# Patient Record
Sex: FEMALE | Race: ASIAN | NOT HISPANIC OR LATINO | Employment: FULL TIME | URBAN - METROPOLITAN AREA
[De-identification: names, ages, dates, MRNs, and addresses within clinical notes are randomized per-mention and may not be internally consistent; named-entity substitution may affect disease eponyms.]

---

## 2017-02-22 ENCOUNTER — TELEPHONE (OUTPATIENT)
Dept: NURSING | Facility: CLINIC | Age: 42
End: 2017-02-22

## 2017-02-22 ENCOUNTER — TELEPHONE (OUTPATIENT)
Dept: FAMILY MEDICINE | Facility: CLINIC | Age: 42
End: 2017-02-22

## 2017-02-22 NOTE — TELEPHONE ENCOUNTER
Patient called the clinic, OV 4/22/16 with Dr. ELVIRA Leiva.   She had a menstrual cycle on Feb 11th, and today another menstrual cycle started.   This time there is more bleeding than usual, used 2 pads not fully soaked today.  She usually has some cramping, but today there no pain.  Patient denied: fever, dizziness, trauma, or use of any new meds.   There is a possibility for pregnancy, but patient is not aware of pregnancy.     NURSING PLAN: Routed to provider Yes and Nursing advice to patient given     Routing to provider team 2, should patient be seen or just monitor?     RECOMMENDED DISPOSITION:  Home care advice -     Monitor amount of bleeding and pain.     Report if: Bleeding persists or worsens, severe abdominal pain, fever, chills, or muscle aches, passing tissues or large clots.  Seek ER: Pale and moist skin, dizziness, soaking 2 pads or tampons per hour for 2 hours.     Will comply with recommendation: Yes  If further questions/concerns or if symptoms do not improve, worsen or new symptoms develop, call your PCP or [facility] as soon as possible.      Guideline used:  Telephone Triage Protocols for Nurses, Fourth Edition, Kitty Purvis RN

## 2017-02-22 NOTE — TELEPHONE ENCOUNTER
Agree with triage disposition.   Sounds like she should have a pregnancy test and office visit if bleeding and pain subside somewhat. If they do not, as recommended by triage, I would have patient seen in the ED. Nicole Joy Siegler, PA-C

## 2017-02-22 NOTE — TELEPHONE ENCOUNTER
"Call Type: Triage Call    Presenting Problem: \"I'm calling back because they left a message.  I'm having my period for the second time this month.\"  Per the  clinic notes, patient should see MD and have a pregnancy test done.  Triage Note:  Guideline Title: Vaginal Bleeding, Premenopausal: Abnormal ; Vaginal  Bleeding, Premenopausal: Abnormal  Recommended Disposition: Provide Home/Self Care  Original Inclination: Wanted to speak with a nurse  Override Disposition: See Provider within 72 Hours  Intended Action: Follow advice given  Physician Contacted: No  All other situations ?  YES  Foul-smelling vaginal discharge ? NO  Vaginal trauma, sexual assault or rape ? NO  Mild abdominal pain or cramping ? NO  Has IUD inserted ? NO  New or worsening signs and symptoms that may indicate shock ? NO  Pregnant,  less than 20 weeks gestation ? NO  Pregnant,  greater than 20 weeks gestation ? NO  Spotting or bleeding from irritation on labia or perineum ? NO  Unbearable abdominal/pelvic pain or cramping ? NO  Abdominal/pelvic pain AND has ectopic risk factor(s) ? NO  Unusual bruising or bleeding from other sites ? NO  Profuse vaginal bleeding not contained by pads or tampons ? NO  Possible trauma from vaginal foreign body ? NO  Mild bleeding or spotting AND moderate abdominal pain or cramping other than  during usual menstrual cycle ? NO  Amount or duration of menstrual bleeding varies from normal pattern for 3 or more  cycles ? NO  Vaginal bleeding between cycles for 3 cycles or more ? NO  Heavy vaginal bleeding (soaking 1 pad/tampon every hour for 2 hours or more) ? NO  Recent GYN surgery ? NO  Post-GYN exam/procedure AND any mild vaginal bleeding lasting longer than  timeframe specified in post-care instructions. ? NO  Mild vaginal bleeding or persistent vaginal spotting or scanty flow for more than  one month AND has been using hormonal contraceptives for more than 3 months ? NO  Menopausal AND not on menopausal hormone " therapy (MHT) ? NO  Menopausal AND on menopausal hormone therapy (MHT) ? NO  Moderate bleeding other than during usual menstrual cycle ? NO  Mild bleeding lasting more than 3 times the woman's usual period length ? NO  Persistent dizziness OR lightheadedness that does not resolve within ten minutes ?  NO  Vaginal bleeding with or without pain following sexual intercourse that is not  first sexual contact ? NO  Recent childbirth or miscarriage ? NO  Passing clots the size of a golf ball or larger OR passing clots for more than one  day with vaginal bleeding ? NO  Vaginal bleeding or spotting following first sexual intercourse ? NO  Physician Instructions:  Care Advice:

## 2017-02-23 NOTE — TELEPHONE ENCOUNTER
Patient states today she does not have any pain.  States did make appointment at 2:30 tomorrow.  States bleeding is about the same today.    Denies: lightheaded, dizziness, pale/clammy skin    Advised again if bleeding or pain worsens to not wait for appointment and be seen in ER.  Patient agreed.  Kyara Clay RN  [employer]riage [employer]

## 2017-02-24 ENCOUNTER — OFFICE VISIT (OUTPATIENT)
Dept: FAMILY MEDICINE | Facility: CLINIC | Age: 42
End: 2017-02-24
Payer: COMMERCIAL

## 2017-02-24 VITALS
TEMPERATURE: 97.5 F | SYSTOLIC BLOOD PRESSURE: 110 MMHG | HEIGHT: 59 IN | OXYGEN SATURATION: 95 % | BODY MASS INDEX: 18.65 KG/M2 | WEIGHT: 92.5 LBS | RESPIRATION RATE: 18 BRPM | DIASTOLIC BLOOD PRESSURE: 60 MMHG | HEART RATE: 98 BPM

## 2017-02-24 DIAGNOSIS — N92.0 EXCESSIVE OR FREQUENT MENSTRUATION: Primary | ICD-10-CM

## 2017-02-24 PROCEDURE — 99214 OFFICE O/P EST MOD 30 MIN: CPT | Performed by: FAMILY MEDICINE

## 2017-02-24 RX ORDER — NITROFURANTOIN 25; 75 MG/1; MG/1
100 CAPSULE ORAL 2 TIMES DAILY
Qty: 14 CAPSULE | Refills: 0 | Status: CANCELLED | OUTPATIENT
Start: 2017-02-24

## 2017-02-24 NOTE — MR AVS SNAPSHOT
After Visit Summary   2/24/2017    Carlos Eng    MRN: [id number]           Patient Information     Date Of Birth          1975        Visit Information        Provider Department      2/24/2017 2:40 PM Tess Leiva MD [facility]        Today's Diagnoses     Excessive or frequent menstruation since 10-16    -  1      Care Instructions    Get the pelvic US  With transvaginal At [facility]   [phone]  At  [address] between the [facility] & [facility]    Boil water and breath the warm vapors 2-3 times a day to try to open up the sinuses. Run a steamer or cold air vaporizer as much as possible. Take Mucinex plain blue  1200 mg (This may come as 600mg/tablet and you need to take 2 tabs twice a day) twice a day. Mucinex is guaifenesin, the major component of most cough syrups, because it makes the mucus less thick, and therefore it drains out better and you are less likely to cough from it dripping on the back of your throat.  Irrigate the  nose with plain water under the kitchen sink faucet or the shower.  Mandy pots, spray bottles, etc accumulate bacteria and are not recommended.   The tickle in the throat is also helped by gargling with vinegar and honey mixture, or pop or mouth wash as these coat the throat.  Please try to rinse teeth with water after using these .           Follow-ups after your visit        Future tests that were ordered for you today     Open Future Orders        Priority Expected Expires Ordered    US Pelvic Complete w Transvaginal Routine 5/25/2017 2/24/2018 2/24/2017            Who to contact     If you have questions or need follow up information about today's clinic visit or your schedule please contact [facility] directly at [phone].  Normal or non-critical lab and imaging results will be communicated to you by MyChart, letter or phone within 4 business days after the  "clinic has received the results. If you do not hear from us within 7 days, please contact the clinic through [employer] or phone. If you have a critical or abnormal lab result, we will notify you by phone as soon as possible.  Submit refill requests through [employer] or call your pharmacy and they will forward the refill request to us. Please allow 3 business days for your refill to be completed.          Additional Information About Your Visit        [facility] [employer] Â® Information     [employer] lets you send messages to your doctor, view your test results, renew your prescriptions, schedule appointments and more. To sign up, go to [email or web address] . Click on \"Log in\" on the left side of the screen, which will take you to the Welcome page. Then click on \"Sign up Now\" on the right side of the page.     You will be asked to enter the access code listed below, as well as some personal information. Please follow the directions to create your username and password.     Your access code is: [email or web address]  Expires: 2017  3:36 PM     Your access code will  in 90 days. If you need help or a new code, please call your [facility] clinic or [phone].        Care EveryWhere ID     This is your Care EveryWhere ID. This could be used by other organizations to access your [facility] medical records  [id number]        Your Vitals Were     Pulse Temperature Respirations Height Last Period Pulse Oximetry    98 97.5  F (36.4  C) (Tympanic) 18 4' 10.5\" (1.486 m) 2017 (Exact Date) 95%    Breastfeeding? BMI (Body Mass Index)                No 19 kg/m2           Blood Pressure from Last 3 Encounters:   17 110/60   16 98/58   14 106/58    Weight from Last 3 Encounters:   17 92 lb 8 oz (42 kg)   16 93 lb (42.2 kg)   14 88 lb (39.9 kg)               Primary Care Provider Office Phone # Fax #    Tess Leiva [phone]       [facility] [address]  [facility] [address]        Thank you!     Thank you for choosing [facility]  for your care. Our goal is always to provide you with excellent care. Hearing back from our patients is one way we can continue to improve our services. Please take a few minutes to complete the written survey that you may receive in the mail after your visit with us. Thank you!             Your Updated Medication List - Protect others around you: Learn how to safely use, store and throw away your medicines at www.disposemymeds.org.          This list is accurate as of: 2/24/17  3:36 PM.  Always use your most recent med list.                   Brand Name Dispense Instructions for use    cholecalciferol 5000 UNITS Caps     90 capsule    Take 1 capsule by mouth daily.       nitrofurantoin (macrocrystal-monohydrate) 100 MG capsule    MACROBID    14 capsule    Take 1 capsule (100 mg) by mouth 2 times daily       NO ACTIVE MEDICATIONS

## 2017-02-24 NOTE — NURSING NOTE
"Chief Complaint   Patient presents with     Vaginal Problem     /60 (BP Location: Left arm, Patient Position: Chair, Cuff Size: Adult Regular)  Pulse 98  Temp 97.5  F (36.4  C) (Tympanic)  Resp 18  Ht 4' 10.5\" (1.486 m)  Wt 92 lb 8 oz (42 kg)  LMP 02/11/2017 (Exact Date)  SpO2 95%  Breastfeeding? No  BMI 19 kg/m2 Estimated body mass index is 19 kg/(m^2) as calculated from the following:    Height as of this encounter: 4' 10.5\" (1.486 m).    Weight as of this encounter: 92 lb 8 oz (42 kg).  BP completed using cuff size: regular   Caryl Welch CMA    Health Maintenance Due   Topic Date Due     INFLUENZA VACCINE (SYSTEM ASSIGNED)  09/01/2016     Health Maintenance reviewed at today's visit patient asked to schedule/complete:   Immunizations:  Patient declined    "

## 2017-02-24 NOTE — PROGRESS NOTES
SUBJECTIVE:                                                    Carlos Eng is a 41 year old female who presents to clinic today for the following health issues:    Vaginal Bleeding (Dysmenorrhea)      Onset:irreg MPs since 10-16     Normal for her was q 28 d    These have been 21d to 43 d apart and now has had a normal MP starting on 2-11-17 and now restarting on 2-22-17    The 1st days of these periods has been heavier than usual     Description:  Duration of bleeding episodes: 1 week at a time  Frequency between periods:  1-2 weeks  Describe bleeding/flow:   Clots: no  Number of pads/hour: 2-3  Cramping: None    Intensity:  mild    Accompanying signs and symptoms: None    History (similar episodes/previous evaluation): None in pt     Mother had a MIKY at this age for bleeding     Precipitating or alleviating factors: None    Therapies tried and outcome: None         Problem list and histories reviewed & adjusted, as indicated.  Additional history: as documented    Labs reviewed in EPIC  Problem list, Medication list, Allergies, and Medical/Social/Surgical histories reviewed in [facility] and updated as appropriate.    ROS:  C: NEGATIVE for fever, chills, change in weight  I: NEGATIVE for worrisome rashes, moles or lesions  E: NEGATIVE for vision changes or irritation  E/M: NEGATIVE for ear, mouth and throat problems  R: NEGATIVE for significant cough or SOB  B: NEGATIVE for masses, tenderness or discharge  CV: NEGATIVE for chest pain, palpitations or peripheral edema  GI: NEGATIVE for nausea, abdominal pain, heartburn, or change in bowel habits   female: dysmenorrhea, irregular menstrual cycles negative and irregular vaginal bleeding  M: NEGATIVE for significant arthralgias or myalgia  N: NEGATIVE for weakness, dizziness or paresthesias  E: NEGATIVE for temperature intolerance, skin/hair changes  H: NEGATIVE for bleeding problems  P: NEGATIVE for changes in mood or affect    OBJECTIVE:                             "                        /60 (BP Location: Left arm, Patient Position: Chair, Cuff Size: Adult Regular)  Pulse 98  Temp 97.5  F (36.4  C) (Tympanic)  Resp 18  Ht 4' 10.5\" (1.486 m)  Wt 92 lb 8 oz (42 kg)  LMP 02/11/2017 (Exact Date)  SpO2 95%  Breastfeeding? No  BMI 19 kg/m2  Body mass index is 19 kg/(m^2).  GENERAL: healthy, alert and no distress  EYES: Eyes grossly normal to inspection, PERRL and conjunctivae and sclerae normal  RESP: lungs clear to auscultation - no rales, rhonchi or wheezes  CV: regular rate and rhythm, normal S1 S2, no S3 or S4, no murmur, click or rub, no peripheral edema and peripheral pulses strong  MS: no gross musculoskeletal defects noted, no edema  SKIN: no suspicious lesions or rashes  NEURO: Normal strength and tone, mentation intact and speech normal  PSYCH: mentation appears normal, affect normal/bright    Diagnostic Test Results:  none      ASSESSMENT/PLAN:                                                              ICD-10-CM    1. Excessive or frequent menstruation since 10-16perimenopausal  N92.0 US Pelvic Complete w Transvaginal       Patient Instructions   Get the pelvic US  With transvaginal At [facility]   [phone]  At  [address] between the [facility] & [facility]    Boil water and breath the warm vapors 2-3 times a day to try to open up the sinuses. Run a steamer or cold air vaporizer as much as possible. Take Mucinex plain blue  1200 mg (This may come as 600mg/tablet and you need to take 2 tabs twice a day) twice a day. Mucinex is guaifenesin, the major component of most cough syrups, because it makes the mucus less thick, and therefore it drains out better and you are less likely to cough from it dripping on the back of your throat.  Irrigate the  nose with plain water under the kitchen sink faucet or the shower.  Mandy pots, spray bottles, etc accumulate bacteria and are not recommended.   The tickle in the throat is also helped by gargling with " vinegar and honey mixture, or pop or mouth wash as these coat the throat.  Please try to rinse teeth with water after using these .     I  Explained the treatment and the reason for it       Tess Leiva MD  [facility]

## 2017-02-24 NOTE — PATIENT INSTRUCTIONS
Get the pelvic US  With transvaginal At [facility]   [phone]  At  [address] between the hospitals & [facility]    Boil water and breath the warm vapors 2-3 times a day to try to open up the sinuses. Run a steamer or cold air vaporizer as much as possible. Take Mucinex plain blue  1200 mg (This may come as 600mg/tablet and you need to take 2 tabs twice a day) twice a day. Mucinex is guaifenesin, the major component of most cough syrups, because it makes the mucus less thick, and therefore it drains out better and you are less likely to cough from it dripping on the back of your throat.  Irrigate the  nose with plain water under the kitchen sink faucet or the shower.  Mandy pots, spray bottles, etc accumulate bacteria and are not recommended.   The tickle in the throat is also helped by gargling with vinegar and honey mixture, or pop or mouth wash as these coat the throat.  Please try to rinse teeth with water after using these .

## 2017-02-28 ENCOUNTER — TRANSFERRED RECORDS (OUTPATIENT)
Dept: HEALTH INFORMATION MANAGEMENT | Facility: CLINIC | Age: 42
End: 2017-02-28

## 2017-03-09 ENCOUNTER — TELEPHONE (OUTPATIENT)
Dept: FAMILY MEDICINE | Facility: CLINIC | Age: 42
End: 2017-03-09

## 2017-03-09 DIAGNOSIS — N92.0 EXCESSIVE OR FREQUENT MENSTRUATION: Primary | ICD-10-CM

## 2017-03-09 DIAGNOSIS — D25.1 INTRAMURAL LEIOMYOMA OF UTERUS: ICD-10-CM

## 2017-03-10 NOTE — TELEPHONE ENCOUNTER
lmoam that has fibroids and should see Gyn Spec  At [phone]   referal done   Please mail copy of US to pt and fax to Gyn   Hard copy to station 2, BX

## 2017-03-21 ENCOUNTER — TRANSFERRED RECORDS (OUTPATIENT)
Dept: HEALTH INFORMATION MANAGEMENT | Facility: CLINIC | Age: 42
End: 2017-03-21

## 2017-12-15 ENCOUNTER — OFFICE VISIT (OUTPATIENT)
Dept: FAMILY MEDICINE | Facility: CLINIC | Age: 42
End: 2017-12-15
Payer: COMMERCIAL

## 2017-12-15 VITALS
HEIGHT: 59 IN | DIASTOLIC BLOOD PRESSURE: 78 MMHG | TEMPERATURE: 97.2 F | BODY MASS INDEX: 19.15 KG/M2 | RESPIRATION RATE: 12 BRPM | HEART RATE: 97 BPM | WEIGHT: 95 LBS | OXYGEN SATURATION: 97 % | SYSTOLIC BLOOD PRESSURE: 120 MMHG

## 2017-12-15 DIAGNOSIS — E78.00 HYPERCHOLESTEROLEMIA: ICD-10-CM

## 2017-12-15 DIAGNOSIS — Z23 NEED FOR PROPHYLACTIC VACCINATION AND INOCULATION AGAINST INFLUENZA: ICD-10-CM

## 2017-12-15 DIAGNOSIS — Z00.00 ROUTINE GENERAL MEDICAL EXAMINATION AT A HEALTH CARE FACILITY: Primary | ICD-10-CM

## 2017-12-15 DIAGNOSIS — Z78.9 NONSMOKER: ICD-10-CM

## 2017-12-15 PROCEDURE — 90686 IIV4 VACC NO PRSV 0.5 ML IM: CPT | Performed by: FAMILY MEDICINE

## 2017-12-15 PROCEDURE — 99396 PREV VISIT EST AGE 40-64: CPT | Mod: 25 | Performed by: FAMILY MEDICINE

## 2017-12-15 PROCEDURE — 90471 IMMUNIZATION ADMIN: CPT | Performed by: FAMILY MEDICINE

## 2017-12-15 NOTE — PROGRESS NOTES
SUBJECTIVE:   CC: Carlos Eng is an 42 year old woman who presents for preventive health visit.     Physical   Annual:     Getting at least 3 servings of Calcium per day::  NO    Bi-annual eye exam::  Yes    Dental care twice a year::  NO    Sleep apnea or symptoms of sleep apnea::  None    Diet::  Regular (no restrictions)    Frequency of exercise::  None    Taking medications regularly::  Yes    Medication side effects::  None    Additional concerns today::  No                Today's PHQ-2 Score:   PHQ-2 ( 1999 Pfizer) 12/15/2017   Q1: Little interest or pleasure in doing things 0   Q2: Feeling down, depressed or hopeless 0   PHQ-2 Score 0   Q1: Little interest or pleasure in doing things Not at all   Q2: Feeling down, depressed or hopeless Not at all   PHQ-2 Score 0       Abuse: Current or Past(Physical, Sexual or Emotional)- No  Do you feel safe in your environment - Yes    Social History   Substance Use Topics     Smoking status: Never Smoker     Smokeless tobacco: Never Used     Alcohol use Yes      Comment: Occ     Alcohol Use 12/15/2017   If you drink alcohol, do you typically have greater than 3 drinks per day OR greater than 7 drinks per week?   No       Reviewed orders with patient.  Reviewed health maintenance and updated orders accordingly - Yes  Labs reviewed in EPIC    Patient under age 50, mutual decision reflected in health maintenance.        Pertinent mammograms are reviewed under the imaging tab.  History of abnormal Pap smear: NO - age 30- 65 PAP every 3 years recommended    Reviewed and updated as needed this visit by clinical staffTobacco  Allergies  Meds  Problems         Reviewed and updated as needed this visit by Provider            Review of Systems  C: NEGATIVE for fever, chills, change in weight  I: NEGATIVE for worrisome rashes, moles or lesions  E: NEGATIVE for vision changes or irritation  ENT: NEGATIVE for ear, mouth and throat problems  R: NEGATIVE for significant cough  "or SOB  B: NEGATIVE for masses, tenderness or discharge  CV: NEGATIVE for chest pain, palpitations or peripheral edema  GI: NEGATIVE for nausea, abdominal pain, heartburn, or change in bowel habits  : NEGATIVE for unusual urinary or vaginal symptoms. Periods are regular.  M: NEGATIVE for significant arthralgias or myalgia  N: NEGATIVE for weakness, dizziness or paresthesias  P: NEGATIVE for changes in mood or affect     OBJECTIVE:   /78  Pulse 97  Temp 97.2  F (36.2  C) (Tympanic)  Resp 12  Ht 4' 10.5\" (1.486 m)  Wt 95 lb (43.1 kg)  LMP  (LMP Unknown)  SpO2 97%  Breastfeeding? No  BMI 19.52 kg/m2  Physical Exam  GENERAL: healthy, alert and no distress  EYES: Eyes grossly normal to inspection, PERRL and conjunctivae and sclerae normal  NECK: no adenopathy, no asymmetry, masses, or scars and thyroid normal to palpation  RESP: lungs clear to auscultation - no rales, rhonchi or wheezes  BREAST: normal without masses, tenderness or nipple discharge and no palpable axillary masses or adenopathy  CV: regular rate and rhythm, normal S1 S2, no S3 or S4, no murmur, click or rub, no peripheral edema and peripheral pulses strong  ABDOMEN: soft, nontender, no hepatosplenomegaly, no masses and bowel sounds normal  MS: no gross musculoskeletal defects noted, no edema  SKIN: no suspicious lesions or rashes  NEURO: Normal strength and tone, mentation intact and speech normal  PSYCH: mentation appears normal, affect normal/bright  LYMPH: no cervical, supraclavicular, axillary, or inguinal adenopathy    ASSESSMENT/PLAN:       ICD-10-CM    1. Routine general medical examination at a health care facility Z00.00    2. Need for prophylactic vaccination and inoculation against influenza Z23 FLU VAC, SPLIT VIRUS IM > 3 YO (QUADRIVALENT) [84623]     Vaccine Administration, Initial [28939]   3. Nonsmoker Z78.9    4. Hypercholesterolemia E78.00        COUNSELING:  Reviewed preventive health counseling, as reflected in patient " "instructions       Regular exercise       Healthy diet/nutrition         reports that she has never smoked. She has never used smokeless tobacco.    Estimated body mass index is 19.52 kg/(m^2) as calculated from the following:    Height as of this encounter: 4' 10.5\" (1.486 m).    Weight as of this encounter: 95 lb (43.1 kg).     Patient Instructions     Preventive Health Recommendations  Female Ages 40 to 49    Yearly exam:     See your health care provider every year in order to  1. Review health changes.   2. Discuss preventive care.    3. Review your medicines if your doctor prescribed any.      Get a Pap test every three years (unless you have an abnormal result and your provider advises testing more often).      If you get Pap tests with HPV test, you only need to test every 5 years, unless you have an abnormal result. You do not need a Pap test if your uterus was removed (hysterectomy) and you have not had cancer.      You should be tested each year for STDs (sexually transmitted diseases), if you're at risk.       Ask your doctor if you should have a mammogram.      Have a colonoscopy (test for colon cancer) if someone in your family has had colon cancer or polyps before age 50.       Have a cholesterol test every 5 years.       Have a diabetes test (fasting glucose) after age 45. If you are at risk for diabetes, you should have this test every 3 years.    Shots: Get a flu shot each year. Get a tetanus shot every 10 years.     Nutrition:     Eat at least 5 servings of fruits and vegetables each day.    Eat whole-grain bread, whole-wheat pasta and brown rice instead of white grains and rice.    Talk to your provider about Calcium and Vitamin D.     Lifestyle    Exercise at least 150 minutes a week (an average of 30 minutes a day, 5 days a week). This will help you control your weight and prevent disease.    Limit alcohol to one drink per day.    No smoking.     Wear sunscreen to prevent skin cancer.    See " your dentist every six months for an exam and cleaning.    1. Please do your breast exam every mo, when you  Change the  calendar page or set an alarm on your cell phone Do a  visual check for dimples, inversion or indentation or any different position of the nipple Feel manually  for any 1cm or larger  size mass ie about the size of an almond Be sure to cover the entire area of both breasts : this extends back to the back on either side and from the collar bone to the bottom of the breasts where you can begin to feel ribs.    3. . Schedule your mammo at [facility]  At [address] at [phone]                  Counseling Resources:  ATP IV Guidelines  Pooled Cohorts Equation Calculator  Breast Cancer Risk Calculator  FRAX Risk Assessment  ICSI Preventive Guidelines  Dietary Guidelines for Americans, 2010  USDA's MyPlate  ASA Prophylaxis  Lung CA Screening    Tess Leiva MD  [facility] XERXESAnswers for HPI/ROS submitted by the patient on 12/15/2017   PHQ-2 Score: 0    Injectable Influenza Immunization Documentation    1.  Is the person to be vaccinated sick today?   No    2. Does the person to be vaccinated have an allergy to a component   of the vaccine?   No  Egg Allergy Algorithm Link    3. Has the person to be vaccinated ever had a serious reaction   to influenza vaccine in the past?   No    4. Has the person to be vaccinated ever had Guillain-Barré syndrome?   No    Form completed by Caryl Welch [facility]

## 2017-12-15 NOTE — NURSING NOTE
"Chief Complaint   Patient presents with     Physical     /78  Pulse 97  Temp 97.2  F (36.2  C) (Tympanic)  Resp 12  Ht 4' 10.5\" (1.486 m)  Wt 95 lb (43.1 kg)  LMP  (LMP Unknown)  SpO2 97%  Breastfeeding? No  BMI 19.52 kg/m2 Estimated body mass index is 19.52 kg/(m^2) as calculated from the following:    Height as of this encounter: 4' 10.5\" (1.486 m).    Weight as of this encounter: 95 lb (43.1 kg).  BP completed using cuff size: regular   Caryl Welch CMA    There are no preventive care reminders to display for this patient.  Health Maintenance reviewed at today's visit patient asked to schedule/complete:   Immunizations:  Patient agrees to schedule    "

## 2017-12-15 NOTE — MR AVS SNAPSHOT
After Visit Summary   12/15/2017    Carlos Eng    MRN: [id number]           Patient Information     Date Of Birth          1975        Visit Information        Provider Department      12/15/2017 4:00 PM Tess Leiva MD [facility]        Today's Diagnoses     Need for prophylactic vaccination and inoculation against influenza    -  1    Routine general medical examination at a health care facility          Care Instructions      Preventive Health Recommendations  Female Ages 40 to 49    Yearly exam:     See your health care provider every year in order to  1. Review health changes.   2. Discuss preventive care.    3. Review your medicines if your doctor prescribed any.      Get a Pap test every three years (unless you have an abnormal result and your provider advises testing more often).      If you get Pap tests with HPV test, you only need to test every 5 years, unless you have an abnormal result. You do not need a Pap test if your uterus was removed (hysterectomy) and you have not had cancer.      You should be tested each year for STDs (sexually transmitted diseases), if you're at risk.       Ask your doctor if you should have a mammogram.      Have a colonoscopy (test for colon cancer) if someone in your family has had colon cancer or polyps before age 50.       Have a cholesterol test every 5 years.       Have a diabetes test (fasting glucose) after age 45. If you are at risk for diabetes, you should have this test every 3 years.    Shots: Get a flu shot each year. Get a tetanus shot every 10 years.     Nutrition:     Eat at least 5 servings of fruits and vegetables each day.    Eat whole-grain bread, whole-wheat pasta and brown rice instead of white grains and rice.    Talk to your provider about Calcium and Vitamin D.     Lifestyle    Exercise at least 150 minutes a week (an average of 30 minutes a day, 5 days a week). This will help you  control your weight and prevent disease.    Limit alcohol to one drink per day.    No smoking.     Wear sunscreen to prevent skin cancer.    See your dentist every six months for an exam and cleaning.    1. Please do your breast exam every mo, when you  Change the  calendar page or set an alarm on your cell phone Do a  visual check for dimples, inversion or indentation or any different position of the nipple Feel manually  for any 1cm or larger  size mass ie about the size of an almond Be sure to cover the entire area of both breasts : this extends back to the back on either side and from the collar bone to the bottom of the breasts where you can begin to feel ribs.    3. . Schedule your mammo at [facility]  At [address] at [phone]                Follow-ups after your visit        Follow-up notes from your care team     Return in about 1 year (around 12/15/2018) for Physical Exam.      Who to contact     If you have questions or need follow up information about today's clinic visit or your schedule please contact [facility] directly at [phone].  Normal or non-critical lab and imaging results will be communicated to you by [employer], letter or phone within 4 business days after the clinic has received the results. If you do not hear from us within 7 days, please contact the clinic through [employer] or phone. If you have a critical or abnormal lab result, we will notify you by phone as soon as possible.  Submit refill requests through [employer] or call your pharmacy and they will forward the refill request to us. Please allow 3 business days for your refill to be completed.          Additional Information About Your Visit        [employer] Information     [employer] gives you secure access to your electronic health record. If you see a primary care provider, you can also send messages to your care team and make appointments. If you have questions, please call your primary  "care clinic.  If you do not have a primary care provider, please call [phone] and they will assist you.        Care EveryWhere ID     This is your Care EveryWhere ID. This could be used by other organizations to access your [facility] medical records  [id number]        Your Vitals Were     Pulse Temperature Respirations Height Last Period Pulse Oximetry    97 97.2  F (36.2  C) (Tympanic) 12 4' 10.5\" (1.486 m) (LMP Unknown) 97%    Breastfeeding? BMI (Body Mass Index)                No 19.52 kg/m2           Blood Pressure from Last 3 Encounters:   12/15/17 120/78   02/24/17 110/60   04/22/16 98/58    Weight from Last 3 Encounters:   12/15/17 95 lb (43.1 kg)   02/24/17 92 lb 8 oz (42 kg)   04/22/16 93 lb (42.2 kg)              Today, you had the following     No orders found for display       Primary Care Provider Office Phone # Fax #    Tess Isabell Leiva [phone]       [address] [facility] [address]        Equal Access to Services     [facility]: Hadii aad ku hadasho Soomaali, waaxda luqadaha, qaybta kaalmada adeegyada, marely mac hayjosen adeeg maria e lacartern . So [facility] [phone].    ATENCIÓN: Si habla español, tiene a rock disposición servicios gratuitos de asistencia lingüística. Llame al [phone].    We comply with applicable federal civil rights laws and [place] laws. We do not discriminate on the basis of race, color, national origin, age, disability, sex, sexual orientation, or gender identity.            Thank you!     Thank you for choosing [facility]  for your care. Our goal is always to provide you with excellent care. Hearing back from our patients is one way we can continue to improve our services. Please take a few minutes to complete the written survey that you may receive in the mail after your visit with us. Thank you!             Your Updated Medication List - Protect others around you: Learn how to safely use, store " and throw away your medicines at www.disposemymeds.org.          This list is accurate as of: 12/15/17  4:41 PM.  Always use your most recent med list.                   Brand Name Dispense Instructions for use Diagnosis    cholecalciferol 5000 UNITS Caps     90 capsule    Take 1 capsule by mouth daily.    Vitamin D deficiency disease       nitroFURantoin (macrocrystal-monohydrate) 100 MG capsule    MACROBID    14 capsule    Take 1 capsule (100 mg) by mouth 2 times daily    Hemorrhagic cystitis       NO ACTIVE MEDICATIONS

## 2017-12-15 NOTE — PATIENT INSTRUCTIONS
Preventive Health Recommendations  Female Ages 40 to 49    Yearly exam:     See your health care provider every year in order to  1. Review health changes.   2. Discuss preventive care.    3. Review your medicines if your doctor prescribed any.      Get a Pap test every three years (unless you have an abnormal result and your provider advises testing more often).      If you get Pap tests with HPV test, you only need to test every 5 years, unless you have an abnormal result. You do not need a Pap test if your uterus was removed (hysterectomy) and you have not had cancer.      You should be tested each year for STDs (sexually transmitted diseases), if you're at risk.       Ask your doctor if you should have a mammogram.      Have a colonoscopy (test for colon cancer) if someone in your family has had colon cancer or polyps before age 50.       Have a cholesterol test every 5 years.       Have a diabetes test (fasting glucose) after age 45. If you are at risk for diabetes, you should have this test every 3 years.    Shots: Get a flu shot each year. Get a tetanus shot every 10 years.     Nutrition:     Eat at least 5 servings of fruits and vegetables each day.    Eat whole-grain bread, whole-wheat pasta and brown rice instead of white grains and rice.    Talk to your provider about Calcium and Vitamin D.     Lifestyle    Exercise at least 150 minutes a week (an average of 30 minutes a day, 5 days a week). This will help you control your weight and prevent disease.    Limit alcohol to one drink per day.    No smoking.     Wear sunscreen to prevent skin cancer.    See your dentist every six months for an exam and cleaning.    1. Please do your breast exam every mo, when you  Change the  calendar page or set an alarm on your cell phone Do a  visual check for dimples, inversion or indentation or any different position of the nipple Feel manually  for any 1cm or larger  size mass ie about the size of an almond Be sure to  cover the entire area of both breasts : this extends back to the back on either side and from the collar bone to the bottom of the breasts where you can begin to feel ribs.    3. . Schedule your mammo at [facility]  At [address] at [phone]

## 2018-04-30 ENCOUNTER — OFFICE VISIT (OUTPATIENT)
Dept: FAMILY MEDICINE | Facility: CLINIC | Age: 43
End: 2018-04-30
Payer: COMMERCIAL

## 2018-04-30 VITALS — TEMPERATURE: 99.7 F | DIASTOLIC BLOOD PRESSURE: 65 MMHG | SYSTOLIC BLOOD PRESSURE: 102 MMHG

## 2018-04-30 DIAGNOSIS — Z71.84 TRAVEL ADVICE ENCOUNTER: Primary | ICD-10-CM

## 2018-04-30 DIAGNOSIS — Z23 NEED FOR VACCINATION: ICD-10-CM

## 2018-04-30 PROCEDURE — 90471 IMMUNIZATION ADMIN: CPT | Mod: GA | Performed by: NURSE PRACTITIONER

## 2018-04-30 PROCEDURE — 90715 TDAP VACCINE 7 YRS/> IM: CPT | Mod: GA | Performed by: NURSE PRACTITIONER

## 2018-04-30 PROCEDURE — 99402 PREV MED CNSL INDIV APPRX 30: CPT | Mod: 25 | Performed by: NURSE PRACTITIONER

## 2018-04-30 RX ORDER — ATOVAQUONE AND PROGUANIL HYDROCHLORIDE 250; 100 MG/1; MG/1
1 TABLET, FILM COATED ORAL DAILY
Qty: 30 TABLET | Refills: 0 | Status: SHIPPED | OUTPATIENT
Start: 2018-04-30 | End: 2019-09-25

## 2018-04-30 RX ORDER — NORETHINDRONE ACETATE AND ETHINYL ESTRADIOL 1MG-20(21)
1 KIT ORAL DAILY
Qty: 84 TABLET | Refills: 3 | COMMUNITY
Start: 2018-04-30 | End: 2018-05-29

## 2018-04-30 RX ORDER — AZITHROMYCIN 500 MG/1
500 TABLET, FILM COATED ORAL DAILY
Qty: 3 TABLET | Refills: 0 | Status: SHIPPED | OUTPATIENT
Start: 2018-04-30 | End: 2020-01-19

## 2018-04-30 NOTE — PROGRESS NOTES
Nurse Note      Itinerary:  [facility]      Departure Date:       Return Date:       Length of Trip 1 month      Reason for Travel: Tourism           Urban or rural: both      Accommodations: Family home        IMMUNIZATION HISTORY  Have you received any immunizations within the past 4 weeks?  No  Have you ever fainted from having your blood drawn or from an injection?  No  Have you ever had a fever reaction to vaccination?  No  Have you ever had any bad reaction or side effect from any vaccination?  No  Have you ever had hepatitis A or B vaccine?  Yes  Do you live (or work closely) with anyone who has AIDS, an AIDS-like condition, any other immune disorder or who is on chemotherapy for cancer?  No  Do you have a family history of immunodeficiency?  No  Have you received any injection of immune globulin or any blood products during the past 12 months?  No    Patient roomed by Musa Andres  Carlos Eng is a 42 year old female seen today alone for counsultation for international travel to [facility] and [facility] for Tourism  Visiting friends and relatives.  Patient will be departing in  6 month(s) and staying for   1 month(s) and  traveling with family member(s).      Patient itinerary :  will begin outside the [facility] region of [facility] then to southern [facility] and [facility] which presents risk for Malaria, Dengue Fever, Chikungungya, Zika, Schistosomiasis, Japanese Encephalitis, Rabies, food borne illnesses, motor vehicle accidents, Typhoid and Leishmaniasis. exposure.      Patient's activities will include visiting friends and relatives and  serives.    Patient's country of birth is [place]    Special medical concerns: none  Pre-travel questionnaire was completed by patient and reviewed by provider.     Vitals: /65 (BP Location: Left arm, Cuff Size: Adult Regular)  Temp 99.7  F (37.6  C) (Oral)  BMI= There is no height or weight on file to calculate BMI.    EXAM:  General:   Well-nourished, well-developed in no acute distress.  Appears to be stated age, interacts appropriately and expresses understanding of information given to patient.    Current Outpatient Prescriptions   Medication Sig Dispense Refill     norethindrone-ethinyl estradiol (JUNEL FE 1/20) 1-20 MG-MCG per tablet Take 1 tablet by mouth daily 84 tablet 3     cholecalciferol 5000 UNITS CAPS Take 1 capsule by mouth daily. 90 capsule 0     nitrofurantoin, macrocrystal-monohydrate, (MACROBID) 100 MG capsule Take 1 capsule (100 mg) by mouth 2 times daily 14 capsule 0     NO ACTIVE MEDICATIONS        Patient Active Problem List   Diagnosis     Family history of diabetes mellitus     Nonsmoker     External hemorrhoids     Hypercholesterolemia     Excessive or frequent menstruation since 10-16     Intramural leiomyoma of uterus     No Known Allergies      Immunizations discussed include:   Hepatitis A:  Up to date  Hepatitis B: Up to date  Influenza: avised future vaccine  Typhoid: Oral Typhoid (Vivotiff) Rx sent to pharmacy  Rabies: patient will call if she decides to get this vaccine   Yellow Fever: Not indicated  Japanese Encephalitis: patient will call if she decides to get this vaccine  Meningococcus: Not indicated  Tetanus/Diphtheria: Ordered/given today, risks, benefits and side effects reviewed  Measles/Mumps/Rubella: Up to date  Cholera: Not needed  Polio: Up to date  Pneumococcal: Under age of 65  Varicella: Immune by disease history per patient report  Zostavax:  Not indicated  Shingrix: Not indicated  HPV:  Not indicated  TB:  Low risk     Altitude Exposure on this trip: no  Past tolerance to Altitude: na    ASSESSMENT/PLAN:    ICD-10-CM    1. Travel advice encounter Z71.89 typhoid (VIVOTIF) CR capsule     TDAP VACCINE (ADACEL)     atovaquone-proguanil (MALARONE) 250-100 MG per tablet     azithromycin (ZITHROMAX) 500 MG tablet   2. Need for vaccination Z23 typhoid (VIVOTIF) CR capsule     TDAP VACCINE (ADACEL)     I  have reviewed general recommendations for safe travel   including: food/water precautions, insect precautions, safer sex   practices given high prevalence of Zika, HIV and other STDs,   roadway safety. Educational materials and [employer] report provided.    Malaraia prophylaxis recommended: Malarone  Symptomatic treatment for traveler's diarrhea: azithromycin  Altitude illness prevention and treatment: none      Evacuation insurance advised and resources were provided to patient.    Total visit time 30 minutes  with over 50% of time spent counseling patient as detailed above.    Angela Traore CNP

## 2018-04-30 NOTE — MR AVS SNAPSHOT
After Visit Summary   4/30/2018    Carlos Eng    MRN: [id number]           Patient Information     Date Of Birth          1975        Visit Information        Provider Department      4/30/2018 2:45 PM Angela Traore APRN [facility]        Today's Diagnoses     Travel advice encounter    -  1    Need for vaccination          Care Instructions    Today April 30, 2018 you received the    Tetanus (Tdap) Vaccine    Typhoid - Oral. This prescription has been faxed to your pharmacy, please take as directed.   .    These appointments can be made as a NURSE ONLY visit.    **It is very important for the vaccinations to be given on the scheduled day(s), this helps ensure you receive the full effectiveness of the vaccine.**    Please call [facility] with any questions [phone]    Thank you for visiting [facility]              Follow-ups after your visit        Who to contact     If you have questions or need follow up information about today's clinic visit or your schedule please contact [facility] directly at [phone].  Normal or non-critical lab and imaging results will be communicated to you by [employer], letter or phone within 4 business days after the clinic has received the results. If you do not hear from us within 7 days, please contact the clinic through [employer] or phone. If you have a critical or abnormal lab result, we will notify you by phone as soon as possible.  Submit refill requests through [employer] or call your pharmacy and they will forward the refill request to us. Please allow 3 business days for your refill to be completed.          Additional Information About Your Visit        MyChart Information     [employer] gives you secure access to your electronic health record. If you see a primary care provider, you can also send messages to your care team and make appointments. If you have questions, please call  your primary care clinic.  If you do not have a primary care provider, please call [phone] and they will assist you.        Care EveryWhere ID     This is your Care EveryWhere ID. This could be used by other organizations to access your [facility] medical records  [id number]        Your Vitals Were     Temperature Last Period                99.7  F (37.6  C) (Oral) 04/07/2018           Blood Pressure from Last 3 Encounters:   04/30/18 102/65   12/15/17 120/78   02/24/17 110/60    Weight from Last 3 Encounters:   12/15/17 95 lb (43.1 kg)   02/24/17 92 lb 8 oz (42 kg)   04/22/16 93 lb (42.2 kg)              We Performed the Following     TDAP VACCINE (ADACEL)          Today's Medication Changes          These changes are accurate as of 4/30/18  3:22 PM.  If you have any questions, ask your nurse or doctor.               Start taking these medicines.        Dose/Directions    atovaquone-proguanil 250-100 MG per tablet   Commonly known as:  MALARONE   Used for:  Travel advice encounter   Started by:  Angela Traore APRN CNP        Dose:  1 tablet   Take 1 tablet by mouth daily Start 2 days before exposure to Malaria and continue daily till  7 days after exposure.   Quantity:  30 tablet   Refills:  0       azithromycin 500 MG tablet   Commonly known as:  ZITHROMAX   Used for:  Travel advice encounter   Started by:  Angela Traore APRN CNP        Dose:  500 mg   Take 1 tablet (500 mg) by mouth daily for 3 doses Take 1 tablet a day for up to 3 days for severe diarrhea   Quantity:  3 tablet   Refills:  0       typhoid CR capsule   Commonly known as:  VIVOTIF   Used for:  Need for vaccination, Travel advice encounter   Started by:  Angela Traroe APRN CNP        Dose:  1 capsule   Take 1 capsule by mouth every other day   Quantity:  4 capsule   Refills:  0            Where to get your medicines      These medications were sent to [id number] - [place], [place] - [address] [place] [address] [address], [facility] [address]     Phone:  [phone]     atovaquone-proguanil 250-100 MG per tablet    azithromycin 500 MG tablet    typhoid CR capsule                Primary Care Provider Office Phone # Fax #    Tess Leiva [phone] [address]  [facility] [address]        Equal Access to Services     [facility]: Hadii aad ku hadasho Soomaali, waaxda luqadaha, qaybta kaalmada adeegyada, waxay idiin hayaan adeeg kharash la'aan ah. So [facility] [phone].    ATENCIÓN: Si habla español, tiene a rock disposición servicios gratuitos de asistencia lingüística. Jeromeame al [phone].    We comply with applicable federal civil rights laws and [place] laws. We do not discriminate on the basis of race, color, national origin, age, disability, sex, sexual orientation, or gender identity.            Thank you!     Thank you for choosing [facility]  for your care. Our goal is always to provide you with excellent care. Hearing back from our patients is one way we can continue to improve our services. Please take a few minutes to complete the written survey that you may receive in the mail after your visit with us. Thank you!             Your Updated Medication List - Protect others around you: Learn how to safely use, store and throw away your medicines at www.disposemymeds.org.          This list is accurate as of 4/30/18  3:22 PM.  Always use your most recent med list.                   Brand Name Dispense Instructions for use Diagnosis    atovaquone-proguanil 250-100 MG per tablet    MALARONE    30 tablet    Take 1 tablet by mouth daily Start 2 days before exposure to Malaria and continue daily till  7 days after exposure.    Travel advice encounter       azithromycin 500 MG tablet    ZITHROMAX    3 tablet    Take 1 tablet (500 mg) by mouth daily for 3 doses Take 1 tablet a day for up to 3 days for severe diarrhea    Travel advice encounter       cholecalciferol 5000  units Caps     90 capsule    Take 1 capsule by mouth daily.    Vitamin D deficiency disease       NO ACTIVE MEDICATIONS           norethindrone-ethinyl estradiol 1-20 MG-MCG per tablet    JUNEL FE 1/20    84 tablet    Take 1 tablet by mouth daily        typhoid CR capsule    VIVOTIF    4 capsule    Take 1 capsule by mouth every other day    Need for vaccination, Travel advice encounter

## 2018-04-30 NOTE — NURSING NOTE
"Chief Complaint   Patient presents with     [facility]     initial /65 (BP Location: Left arm, Cuff Size: Adult Regular)  Temp 99.7  F (37.6  C) (Oral) Estimated body mass index is 19.52 kg/(m^2) as calculated from the following:    Height as of 12/15/17: 4' 10.5\" (1.486 m).    Weight as of 12/15/17: 95 lb (43.1 kg).  BP completed using cuff size: regular.  L   arm      Health Maintenance that is potentially due pending provider review:  NONE    n/a    Musa Owen ma  "

## 2018-05-29 DIAGNOSIS — N92.0 EXCESSIVE OR FREQUENT MENSTRUATION: Primary | ICD-10-CM

## 2018-05-30 NOTE — TELEPHONE ENCOUNTER
"Requested Prescriptions   Pending Prescriptions Disp Refills     norethindrone-ethinyl estradiol (JUNEL FE 1/20) 1-20 MG-MCG per tablet  Last Written Prescription Date:  4/30/18  Last Fill Quantity: 84,  # refills: 3   Last office visit: 4/30/2018 with prescribing provider:  León   Future Office Visit:     84 tablet 3     Sig: Take 1 tablet by mouth daily    Contraceptives Protocol Passed    5/29/2018 10:49 AM       Passed - Patient is not a current smoker if age is 35 or older       Passed - Recent (12 mo) or future (30 days) visit within the authorizing provider's specialty    Patient had office visit in the last 12 months or has a visit in the next 30 days with authorizing provider or within the authorizing provider's specialty.  See \"Patient Info\" tab in inbasket, or \"Choose Columns\" in Meds & Orders section of the refill encounter.           Passed - No active pregnancy on record       Passed - No positive pregnancy test in past 12 months          "

## 2018-05-31 RX ORDER — NORETHINDRONE ACETATE AND ETHINYL ESTRADIOL 1MG-20(21)
1 KIT ORAL DAILY
Qty: 84 TABLET | Refills: 1 | Status: SHIPPED | OUTPATIENT
Start: 2018-05-31 | End: 2018-10-22

## 2018-06-01 NOTE — TELEPHONE ENCOUNTER
Called patient back. The prescription was sent over yesterday.     Called [facility] pharmacy (where we sent it) and confirmed they received it and that it has been filled.

## 2018-10-20 DIAGNOSIS — N92.0 EXCESSIVE OR FREQUENT MENSTRUATION: ICD-10-CM

## 2018-10-22 RX ORDER — NORETHINDRONE ACETATE AND ETHINYL ESTRADIOL 1MG-20(21)
KIT ORAL
Qty: 84 TABLET | Refills: 0 | OUTPATIENT
Start: 2018-10-22

## 2018-10-22 RX ORDER — NORETHINDRONE ACETATE AND ETHINYL ESTRADIOL 1MG-20(21)
1 KIT ORAL DAILY
Qty: 84 TABLET | Refills: 0 | Status: SHIPPED | OUTPATIENT
Start: 2018-10-22 | End: 2019-01-12

## 2018-10-22 NOTE — TELEPHONE ENCOUNTER
"Requested Prescriptions   Pending Prescriptions Disp Refills     BLISOVI FE 1/20 1-20 MG-MCG per tablet [Pharmacy Med Name: Blisovi FE 1/20 Oral Tablet 1-20 MG-MCG]  Last Written Prescription Date:  5/31/2018  Last Fill Quantity: 84 tablet,  # refills: 1   Last Office Visit  4/30/2018        with  [facility], [facility] or [facility] prescribing provider:     Future Office Visit:        84 tablet 0     Sig: Take 1 tablet by mouth daily    Contraceptives Protocol Passed    10/20/2018  1:39 PM       Passed - Patient is not a current smoker if age is 35 or older       Passed - Recent (12 mo) or future (30 days) visit within the authorizing provider's specialty    Patient had office visit in the last 12 months or has a visit in the next 30 days with authorizing provider or within the authorizing provider's specialty.  See \"Patient Info\" tab in inbasket, or \"Choose Columns\" in Meds & Orders section of the refill encounter.             Passed - No active pregnancy on record       Passed - No positive pregnancy test in past 12 months          "

## 2018-10-29 ENCOUNTER — HOSPITAL ENCOUNTER (EMERGENCY)
Facility: CLINIC | Age: 43
Discharge: HOME | End: 2018-10-29
Attending: EMERGENCY MEDICINE | Admitting: EMERGENCY MEDICINE
Payer: COMMERCIAL

## 2018-10-29 VITALS
TEMPERATURE: 98.6 F | BODY MASS INDEX: 18.14 KG/M2 | DIASTOLIC BLOOD PRESSURE: 45 MMHG | OXYGEN SATURATION: 99 % | WEIGHT: 90 LBS | HEIGHT: 59 IN | HEART RATE: 84 BPM | SYSTOLIC BLOOD PRESSURE: 98 MMHG

## 2018-10-29 DIAGNOSIS — K64.5 THROMBOSED EXTERNAL HEMORRHOIDS: ICD-10-CM

## 2018-10-29 PROCEDURE — 99283 EMERGENCY DEPT VISIT LOW MDM: CPT

## 2018-10-29 PROCEDURE — 25000132 ZZH RX MED GY IP 250 OP 250 PS 637: Performed by: EMERGENCY MEDICINE

## 2018-10-29 RX ORDER — NITROGLYCERIN 4 MG/G
1 OINTMENT RECTAL ONCE
Status: DISCONTINUED | OUTPATIENT
Start: 2018-10-29 | End: 2018-10-29 | Stop reason: HOSPADM

## 2018-10-29 RX ORDER — IBUPROFEN 200 MG
400 TABLET ORAL ONCE
Status: COMPLETED | OUTPATIENT
Start: 2018-10-29 | End: 2018-10-29

## 2018-10-29 RX ORDER — LIDOCAINE 40 MG/G
CREAM TOPICAL
Status: DISCONTINUED
Start: 2018-10-29 | End: 2018-10-29 | Stop reason: HOSPADM

## 2018-10-29 RX ORDER — ACETAMINOPHEN 325 MG/1
975 TABLET ORAL ONCE
Status: COMPLETED | OUTPATIENT
Start: 2018-10-29 | End: 2018-10-29

## 2018-10-29 RX ORDER — IBUPROFEN 200 MG
400 TABLET ORAL EVERY 8 HOURS PRN
Qty: 60 TABLET | Refills: 0 | Status: SHIPPED | OUTPATIENT
Start: 2018-10-29 | End: 2019-09-25

## 2018-10-29 RX ORDER — OXYCODONE HYDROCHLORIDE 5 MG/1
5 TABLET ORAL EVERY 6 HOURS PRN
Qty: 8 TABLET | Refills: 0 | Status: SHIPPED | OUTPATIENT
Start: 2018-10-29 | End: 2019-09-25

## 2018-10-29 RX ORDER — ACETAMINOPHEN 500 MG
1000 TABLET ORAL EVERY 6 HOURS PRN
Qty: 60 TABLET | Refills: 0 | Status: SHIPPED | OUTPATIENT
Start: 2018-10-29 | End: 2018-11-05

## 2018-10-29 RX ORDER — LIDOCAINE 50 MG/G
1 OINTMENT TOPICAL 3 TIMES DAILY PRN
Qty: 30 G | Refills: 0 | Status: SHIPPED | OUTPATIENT
Start: 2018-10-29 | End: 2019-09-25

## 2018-10-29 RX ORDER — OXYCODONE HYDROCHLORIDE 5 MG/1
5 TABLET ORAL ONCE
Status: COMPLETED | OUTPATIENT
Start: 2018-10-29 | End: 2018-10-29

## 2018-10-29 RX ADMIN — ACETAMINOPHEN 975 MG: 325 TABLET, FILM COATED ORAL at 10:34

## 2018-10-29 RX ADMIN — IBUPROFEN 400 MG: 200 TABLET, FILM COATED ORAL at 10:34

## 2018-10-29 RX ADMIN — OXYCODONE HYDROCHLORIDE 5 MG: 5 TABLET ORAL at 10:34

## 2018-10-29 ASSESSMENT — ENCOUNTER SYMPTOMS
ABDOMINAL PAIN: 0
DIARRHEA: 1
FEVER: 0
CONSTIPATION: 0
VOMITING: 0
RECTAL PAIN: 1
BLOOD IN STOOL: 0

## 2018-10-29 NOTE — ED PROVIDER NOTES
"  History     Chief Complaint:  Rectal pain    HPI   Carlos Eng is a 42 year old female with a history of external hemorrhoids who presents with diarrhea and rectal pain. The patient states that yesterday she began having episodes of diarrhea and has had a total of four episodes. Then she started experiencing sharp rectal pain which has not been improved with tylenol, Preparation H, and a warm bath. She notes blood on her toilet paper and that this pain is similar but worse than her past hemorrhoid pain. The patient denies any blood mixed in her stool, fevers, abdominal pain, or vomiting.     Allergies:  No known allergies     Medications:    Junel   Malarone  Vivotif     Past Medical History:    Intramural leiomyoma of uterus  Hypercholesterolemia  External hemorrhoids    Past Surgical History:    Back surgery    Family History:    Diabetes  Endocrine disorder  Cancer  Cerebrovascular disease    Social History:  Patient presents with friend  Negative for tobacco use.  Occasional alcohol use  Marital Status:        Review of Systems   Constitutional: Negative for fever.   Gastrointestinal: Positive for diarrhea and rectal pain. Negative for abdominal pain, blood in stool, constipation and vomiting.   All other systems reviewed and are negative.      Physical Exam   First Vitals:  BP: 105/83  Heart Rate: 100  Temp: 98.6  F (37  C)  Height: 149.9 cm (4' 11\")  Weight: 40.8 kg (90 lb)  SpO2: 98 %      Physical Exam  HENT: Moist oral mucosa.   Eyes: Grossly normal vision. Pupils are equal and round. Extraocular movements intact.  Sclera clear and without icterus.  Cardiovascular: Normal rate. Regular rhythm. S1 and S2 without audible murmurs. 2+ radial pulses. Normal capillary refill.  Respiratory: Effort normal. Clear breath sounds bilaterally.  Gastrointestinal: Soft. No distension. No tenderness to palpation. No rebound or guarding.  Rectal: 1.5 cm erythematous and tender mass at the anal verge. No active " bleeding. There are darker areas of ecchymosis. No fluctuance or surrounding erythema  Neurologic: Alert and awake. Coordinated movement of extremities. Speech normal.  Skin: No diaphoresis, rashes, ecchymoses, or lesions.  Musculoskeletal: No lower extremity edema. No gross deformity. No joint swelling.  Psychiatric: Appropriate affect. Behavior is normal. Intact recent and remote memory. Linear thought process.      Emergency Department Course   Interventions:  0955 - LMX4 cream topical    Emergency Department Course:  Nursing notes and vitals reviewed.  0941: I performed an exam of the patient as documented above.     1130: I rechecked the patient. Findings and plan explained to the Patient. Patient discharged home with instructions regarding supportive care, medications, and reasons to return. The importance of close follow-up was reviewed.       Impression & Plan    Medical Decision Making:  Patient who presents with rectal pain since yesterday after having multiple loose stools.  Her exam is consistent with a partially thrombosed external hemorrhoid.  I see no induration or fluctuance to suggest infection or deeper abscess.  Topical 4% lidocaine cream was applied with minimal relief of her symptoms.  The patient was subsequently given Tylenol, ibuprofen, oxycodone with further alleviation of her pain.  Given that the hemorrhoid is only partially thrombosed, I believe that the risks of doing a thrombectomy procedure and causing bleeding are higher than the potential benefits.  We will attempt medical and conservative management at this time with use of psyllium powder, topical lidocaine ointment, Tylenol, ibuprofen, oxycodone as needed for breakthrough severe pain, sits baths.  She was given the information for colorectal surgery clinic with which to follow-up.  Return precautions for severe refractory pain, fever, heavy bleeding.  She left the emergency department in improved condition.    Diagnosis:     ICD-10-CM    1. Thrombosed external hemorrhoids K64.5        Disposition:  discharged to home    Discharge Medications:  New Prescriptions    ACETAMINOPHEN (TYLENOL) 500 MG TABLET    Take 2 tablets (1,000 mg) by mouth every 6 hours as needed for pain    IBUPROFEN (ADVIL/MOTRIN) 200 MG TABLET    Take 2 tablets (400 mg) by mouth every 8 hours as needed for pain    LIDOCAINE (XYLOCAINE) 5 % OINTMENT    Apply 1 g topically 3 times daily as needed for moderate pain    OXYCODONE IR (ROXICODONE) 5 MG TABLET    Take 1 tablet (5 mg) by mouth every 6 hours as needed for moderate to severe pain or severe pain    PSYLLIUM 0.52 G CAPSULE    Take 1 capsule (0.52 g) by mouth daily     IAllen, am serving as a scribe on 10/29/2018 at 9:41 AM to personally document services performed by Huber Cooper MD based on my observations and the provider's statements to me.       Allen Amaya  10/29/2018   [facility] EMERGENCY DEPARTMENT       Huber Cooper MD  10/29/18 1210

## 2018-10-29 NOTE — ED AVS SNAPSHOT
[facility] Emergency Department    [address]    [facility] [address]    Phone:  [phone]    Fax:  [phone]                                       Carlos Eng   MRN: [id number]    Department:  [facility] Emergency Department   Date of Visit:  10/29/2018           After Visit Summary Signature Page     I have received my discharge instructions, and my questions have been answered. I have discussed any challenges I see with this plan with the nurse or doctor.    ..........................................................................................................................................  Patient/Patient Representative Signature      ..........................................................................................................................................  Patient Representative Print Name and Relationship to Patient    ..................................................               ................................................  Date                                   Time    ..........................................................................................................................................  Reviewed by Signature/Title    ...................................................              ..............................................  Date                                               Time          22EPIC Rev 08/18

## 2018-10-29 NOTE — ED TRIAGE NOTES
"Pt had diarrhea yesterday and is having rectal pain today stating \"I think I blistered my hemorrhoids\".  She has small amount of rectal bleeding.  "

## 2018-10-29 NOTE — DISCHARGE INSTRUCTIONS
Thrombosed Hemorrhoids    Hemorrhoids are swollen veins in the lower rectum and anus. They're similar to varicose veins that form in the legs. Hemorrhoids can happen inside the rectum (internal hemorrhoids). Or one may form at the anal opening (external hemorrhoids). Although they may bleed, most hemorrhoids aren't cause for concern. But a small blood clot (thrombus) can develop in an external hemorrhoid. This may lead to severe pain and sometimes bleeding.  When to go to the emergency room (ER)  If you have severe pain or excessive bleeding, seek immediate medical care.  What to expect in the ER  A healthcare provider is likely to check your anus and rectum using a slender, lighted tube (anoscope or proctoscope). A local anesthetic is given to ease any discomfort.  Treatment  Treatment recommendations include the following:    If the blood clot has formed within the past 48 to 72 hours, your healthcare provider may remove it from within the hemorrhoid. This is a simple procedure that can relieve pain. You will have a local anesthetic to keep you pain-free during the procedure. A small incision is made in the skin, and the blood clot is removed. Stitches are generally not needed.    If more than 72 hours have passed, your healthcare provider will suggest home treatments. Simple home treatments can relieve your pain. These may include warm baths, ointments, suppositories, and witch hazel compresses. Many thrombosed hemorrhoids go away on their own in a few weeks.    If you have persistent bleeding or painful hemorrhoids, talk to your healthcare provider about possible treatment with banding, ligation, or removal (hemorrhoidectomy).  Tips for preventing hemorrhoids  Tips include the following:    Eat foods that are high in fiber and use fiber supplements to help prevent constipation.    Drink plenty of liquids.    Get regular exercise to help prevent constipation and promote good bowel function.   Date Last  Reviewed: 6/1/2016 2000-2017 [employer]. [address] [place] [address], [place] [address]. All rights reserved. This information is not intended as a substitute for professional medical care. Always follow your healthcare professional's instructions.

## 2018-10-29 NOTE — ED AVS SNAPSHOT
[facility] Emergency Department    [address]    [facility] [address]    Phone:  [phone]    Fax:  [phone]                                       Carlos Eng   MRN: [id number]    Department:  [facility] Emergency Department   Date of Visit:  10/29/2018           Patient Information     Date Of Birth          1975        Your diagnoses for this visit were:     Thrombosed external hemorrhoids        You were seen by Huber Cooper MD.      Follow-up Information     Follow up with [facility]. Call in 1 day.    Specialty:  Colon and Rectal Surgery    Contact information:    [address] [place]  [facility] [address] [phone]        Discharge Instructions         Thrombosed Hemorrhoids    Hemorrhoids are swollen veins in the lower rectum and anus. They're similar to varicose veins that form in the legs. Hemorrhoids can happen inside the rectum (internal hemorrhoids). Or one may form at the anal opening (external hemorrhoids). Although they may bleed, most hemorrhoids aren't cause for concern. But a small blood clot (thrombus) can develop in an external hemorrhoid. This may lead to severe pain and sometimes bleeding.  When to go to the emergency room (ER)  If you have severe pain or excessive bleeding, seek immediate medical care.  What to expect in the ER  A healthcare provider is likely to check your anus and rectum using a slender, lighted tube (anoscope or proctoscope). A local anesthetic is given to ease any discomfort.  Treatment  Treatment recommendations include the following:    If the blood clot has formed within the past 48 to 72 hours, your healthcare provider may remove it from within the hemorrhoid. This is a simple procedure that can relieve pain. You will have a local anesthetic to keep you pain-free during the procedure. A small incision is made in the skin, and the blood clot is removed.  Stitches are generally not needed.    If more than 72 hours have passed, your healthcare provider will suggest home treatments. Simple home treatments can relieve your pain. These may include warm baths, ointments, suppositories, and witch hazel compresses. Many thrombosed hemorrhoids go away on their own in a few weeks.    If you have persistent bleeding or painful hemorrhoids, talk to your healthcare provider about possible treatment with banding, ligation, or removal (hemorrhoidectomy).  Tips for preventing hemorrhoids  Tips include the following:    Eat foods that are high in fiber and use fiber supplements to help prevent constipation.    Drink plenty of liquids.    Get regular exercise to help prevent constipation and promote good bowel function.   Date Last Reviewed: 6/1/2016 2000-2017 [employer]. [address] [place] [address], [place] [address]. All rights reserved. This information is not intended as a substitute for professional medical care. Always follow your healthcare professional's instructions.          24 Hour Appointment Hotline       To make an appointment at any [facility] clinic, call [phone] ([phone]). If you don't have a family doctor or clinic, we will help you find one. [facility] clinics are conveniently located to serve the needs of you and your family.             Review of your medicines      START taking        Dose / Directions Last dose taken    acetaminophen 500 MG tablet   Commonly known as:  TYLENOL   Dose:  1000 mg   Quantity:  60 tablet        Take 2 tablets (1,000 mg) by mouth every 6 hours as needed for pain   Refills:  0        ibuprofen 200 MG tablet   Commonly known as:  ADVIL/MOTRIN   Dose:  400 mg   Quantity:  60 tablet        Take 2 tablets (400 mg) by mouth every 8 hours as needed for pain   Refills:  0        lidocaine 5 % ointment   Commonly known as:  XYLOCAINE   Dose:  1 inch   Quantity:  30 g        Apply 1 g topically 3 times daily as needed  for moderate pain   Refills:  0        oxyCODONE IR 5 MG tablet   Commonly known as:  ROXICODONE   Dose:  5 mg   Quantity:  8 tablet        Take 1 tablet (5 mg) by mouth every 6 hours as needed for moderate to severe pain or severe pain   Refills:  0        psyllium 0.52 g capsule   Dose:  1 capsule   Quantity:  540 capsule        Take 1 capsule (0.52 g) by mouth daily   Refills:  0          Our records show that you are taking the medicines listed below. If these are incorrect, please call your family doctor or clinic.        Dose / Directions Last dose taken    atovaquone-proguanil 250-100 MG per tablet   Commonly known as:  MALARONE   Dose:  1 tablet   Quantity:  30 tablet        Take 1 tablet by mouth daily Start 2 days before exposure to Malaria and continue daily till  7 days after exposure.   Refills:  0        cholecalciferol 5000 units Caps   Dose:  1 capsule   Quantity:  90 capsule        Take 1 capsule by mouth daily.   Refills:  0        NO ACTIVE MEDICATIONS        Refills:  0        norethindrone-ethinyl estradiol 1-20 MG-MCG per tablet   Commonly known as:  JUNEL FE 1/20   Dose:  1 tablet   Quantity:  84 tablet        Take 1 tablet by mouth daily   Refills:  0        typhoid CR capsule   Commonly known as:  VIVOTIF   Dose:  1 capsule   Quantity:  4 capsule        Take 1 capsule by mouth every other day   Refills:  0                Information about OPIOIDS     PRESCRIPTION OPIOIDS: WHAT YOU NEED TO KNOW   We gave you an opioid (narcotic) pain medicine. It is important to manage your pain, but opioids are not always the best choice. You should first try all the other options your care team gave you. Take this medicine for as short a time (and as few doses) as possible.    Some activities can increase your pain, such as bandage changes or therapy sessions. It may help to take your pain medicine 30 to 60 minutes before these activities. Reduce your stress by getting enough sleep, working on hobbies you  enjoy and practicing relaxation or meditation. Talk to your care team about ways to manage your pain beyond prescription opioids.    These medicines have risks:    DO NOT drive when on new or higher doses of pain medicine. These medicines can affect your alertness and reaction times, and you could be arrested for driving under the influence (DUI). If you need to use opioids long-term, talk to your care team about driving.    DO NOT operate heavy machinery    DO NOT do any other dangerous activities while taking these medicines.    DO NOT drink any alcohol while taking these medicines.     If the opioid prescribed includes acetaminophen, DO NOT take with any other medicines that contain acetaminophen. Read all labels carefully. Look for the word  acetaminophen  or  Tylenol.  Ask your pharmacist if you have questions or are unsure.    You can get addicted to pain medicines, especially if you have a history of addiction (chemical, alcohol or substance dependence). Talk to your care team about ways to reduce this risk.    All opioids tend to cause constipation. Drink plenty of water and eat foods that have a lot of fiber, such as fruits, vegetables, prune juice, apple juice and high-fiber cereal. Take a laxative (Miralax, milk of magnesia, Colace, Senna) if you don t move your bowels at least every other day. Other side effects include upset stomach, sleepiness, dizziness, throwing up, tolerance (needing more of the medicine to have the same effect), physical dependence and slowed breathing.    Store your pills in a secure place, locked if possible. We will not replace any lost or stolen medicine. If you don t finish your medicine, please throw away (dispose) as directed by your pharmacist. The [place] [employer] has more information about safe disposal: https://www.pca.[facility].mn.us/living-green/managing-unwanted-medications        Prescriptions were sent or printed at these locations (5 Prescriptions)                    Other Prescriptions                Printed at Department/Unit printer (5 of 5)         acetaminophen (TYLENOL) 500 MG tablet               ibuprofen (ADVIL/MOTRIN) 200 MG tablet               lidocaine (XYLOCAINE) 5 % ointment               oxyCODONE IR (ROXICODONE) 5 MG tablet               psyllium 0.52 g capsule                Orders Needing Specimen Collection     None      Pending Results     No orders found from 10/27/2018 to 10/30/2018.            Pending Culture Results     No orders found from 10/27/2018 to 10/30/2018.            Pending Results Instructions     If you had any lab results that were not finalized at the time of your Discharge, you can call the ED Lab Result RN at [phone]. You will be contacted by this team for any positive Lab results or changes in treatment. The nurses are available 7 days a week from 10A to 6:30P.  You can leave a message 24 hours per day and they will return your call.        Test Results From Your Hospital Stay               Clinical Quality Measure: Blood Pressure Screening     Your blood pressure was checked while you were in the emergency department today. The last reading we obtained was  BP: 105/83 . Please read the guidelines below about what these numbers mean and what you should do about them.  If your systolic blood pressure (the top number) is less than 120 and your diastolic blood pressure (the bottom number) is less than 80, then your blood pressure is normal. There is nothing more that you need to do about it.  If your systolic blood pressure (the top number) is 120-139 or your diastolic blood pressure (the bottom number) is 80-89, your blood pressure may be higher than it should be. You should have your blood pressure rechecked within a year by a primary care provider.  If your systolic blood pressure (the top number) is 140 or greater or your diastolic blood pressure (the bottom number) is 90 or greater, you may have high blood  pressure. High blood pressure is treatable, but if left untreated over time it can put you at risk for heart attack, stroke, or kidney failure. You should have your blood pressure rechecked by a primary care provider within the next 4 weeks.  If your provider in the emergency department today gave you specific instructions to follow-up with your doctor or provider even sooner than that, you should follow that instruction and not wait for up to 4 weeks for your follow-up visit.        Thank you for choosing [facility]       Thank you for choosing [facility] for your care. Our goal is always to provide you with excellent care. Hearing back from our patients is one way we can continue to improve our services. Please take a few minutes to complete the written survey that you may receive in the mail after you visit with us. Thank you!        [employer] Information     [employer] gives you secure access to your electronic health record. If you see a primary care provider, you can also send messages to your care team and make appointments. If you have questions, please call your primary care clinic.  If you do not have a primary care provider, please call [phone] and they will assist you.        Care EveryWhere ID     This is your Care EveryWhere ID. This could be used by other organizations to access your [facility] medical records  [id number]        Equal Access to Services     AUBREE RODRIGUEZ : Raj English, mckenzie haryd, lakia cortez, marely quach. So [facility] [phone].    ATENCIÓN: Si habla español, tiene a rock disposición servicios gratuitos de asistencia lingüística. Llame al [phone].    We comply with applicable federal civil rights laws and [place] laws. We do not discriminate on the basis of race, color, national origin, age, disability, sex, sexual orientation, or gender identity.            After Visit Summary       This is your record. Keep this with  you and show to your community pharmacist(s) and doctor(s) at your next visit.

## 2018-11-01 ENCOUNTER — OFFICE VISIT (OUTPATIENT)
Dept: FAMILY MEDICINE | Facility: CLINIC | Age: 43
End: 2018-11-01
Payer: COMMERCIAL

## 2018-11-01 VITALS
TEMPERATURE: 98 F | HEART RATE: 80 BPM | SYSTOLIC BLOOD PRESSURE: 110 MMHG | BODY MASS INDEX: 18.18 KG/M2 | WEIGHT: 90 LBS | DIASTOLIC BLOOD PRESSURE: 70 MMHG | RESPIRATION RATE: 16 BRPM

## 2018-11-01 DIAGNOSIS — N93.9 VAGINAL BLEEDING: ICD-10-CM

## 2018-11-01 DIAGNOSIS — K64.9 HEMORRHOIDS, UNSPECIFIED HEMORRHOID TYPE: Primary | ICD-10-CM

## 2018-11-01 DIAGNOSIS — Z23 NEED FOR PROPHYLACTIC VACCINATION AND INOCULATION AGAINST INFLUENZA: ICD-10-CM

## 2018-11-01 PROCEDURE — 90471 IMMUNIZATION ADMIN: CPT | Performed by: PHYSICIAN ASSISTANT

## 2018-11-01 PROCEDURE — 99214 OFFICE O/P EST MOD 30 MIN: CPT | Mod: 25 | Performed by: PHYSICIAN ASSISTANT

## 2018-11-01 PROCEDURE — 90686 IIV4 VACC NO PRSV 0.5 ML IM: CPT | Performed by: PHYSICIAN ASSISTANT

## 2018-11-01 ASSESSMENT — ENCOUNTER SYMPTOMS
RESPIRATORY NEGATIVE: 1
PSYCHIATRIC NEGATIVE: 1
NEUROLOGICAL NEGATIVE: 1
EYES NEGATIVE: 1
MUSCULOSKELETAL NEGATIVE: 1
CARDIOVASCULAR NEGATIVE: 1
RECTAL PAIN: 1
CONSTITUTIONAL NEGATIVE: 1

## 2018-11-01 NOTE — PROGRESS NOTES
SUBJECTIVE:   Carlos Eng is a 42 year old female who presents to clinic today for the following health issues:    ED/UC Followup:    Facility:  [facility]  Date of visit: 10/29/18  Reason for visit: hemorrhoids  Current Status: swelling has gone down some, still having pain      She started spotting this week, which is abnormal for her.  She has had issues with spotting in the past, controlled with OCPs.  She missed a dose of her OCP on Sunday and started spotting 3 days later.  She is confident this is vaginal bleeding and not rectal bleeding.      Rectal pain is improving.  Treating with sitz baths, topical lidocaine and Preperation H with witch hazel.     Problem list and histories reviewed & adjusted, as indicated.  Additional history: as documented    Patient Active Problem List   Diagnosis     Family history of diabetes mellitus     External hemorrhoids     Hypercholesterolemia     Excessive or frequent menstruation since 10-16     Intramural leiomyoma of uterus     Past Surgical History:   Procedure Laterality Date     BACK SURGERY         Social History   Substance Use Topics     Smoking status: Never Smoker     Smokeless tobacco: Never Used     Alcohol use Yes      Comment: Occ     Family History   Problem Relation Age of Onset     Diabetes Mother      Endocrine Disease Mother      Cancer Father      Cerebrovascular Disease Maternal Grandmother      Diabetes Maternal Uncle      Cerebrovascular Disease Maternal Uncle      Coronary Artery Disease No family hx of      Hypertension No family hx of      Hyperlipidemia No family hx of      Breast Cancer No family hx of      Colon Cancer No family hx of      Prostate Cancer No family hx of      Other Cancer No family hx of      Depression No family hx of      Anxiety Disorder No family hx of      Mental Illness No family hx of      Substance Abuse No family hx of      Anesthesia Reaction No family hx of      Asthma No family hx of      Osteoporosis No  family hx of      Genetic Disorder No family hx of      Thyroid Disease No family hx of      Obesity No family hx of      Unknown/Adopted No family hx of          Current Outpatient Prescriptions   Medication Sig Dispense Refill     acetaminophen (TYLENOL) 500 MG tablet Take 2 tablets (1,000 mg) by mouth every 6 hours as needed for pain 60 tablet 0     cholecalciferol 5000 UNITS CAPS Take 1 capsule by mouth daily. 90 capsule 0     ibuprofen (ADVIL/MOTRIN) 200 MG tablet Take 2 tablets (400 mg) by mouth every 8 hours as needed for pain 60 tablet 0     lidocaine (XYLOCAINE) 5 % ointment Apply 1 g topically 3 times daily as needed for moderate pain 30 g 0     NO ACTIVE MEDICATIONS        norethindrone-ethinyl estradiol (JUNEL FE 1/20) 1-20 MG-MCG per tablet Take 1 tablet by mouth daily 84 tablet 0     typhoid (VIVOTIF) CR capsule Take 1 capsule by mouth every other day 4 capsule 0     atovaquone-proguanil (MALARONE) 250-100 MG per tablet Take 1 tablet by mouth daily Start 2 days before exposure to Malaria and continue daily till  7 days after exposure. (Patient not taking: Reported on 11/1/2018) 30 tablet 0     oxyCODONE IR (ROXICODONE) 5 MG tablet Take 1 tablet (5 mg) by mouth every 6 hours as needed for moderate to severe pain or severe pain (Patient not taking: Reported on 11/1/2018) 8 tablet 0     psyllium 0.52 g capsule Take 1 capsule (0.52 g) by mouth daily (Patient not taking: Reported on 11/1/2018) 540 capsule 0     No Known Allergies    Reviewed and updated as needed this visit by clinical staff  Tobacco  Allergies  Meds  Med Hx  Surg Hx  Fam Hx  Soc Hx      Reviewed and updated as needed this visit by Provider         Review of Systems   Constitutional: Negative.    HENT: Negative.    Eyes: Negative.    Respiratory: Negative.    Cardiovascular: Negative.    Gastrointestinal: Positive for rectal pain.   Genitourinary: Positive for vaginal bleeding.   Musculoskeletal: Negative.    Skin: Negative.     Neurological: Negative.    Psychiatric/Behavioral: Negative.        OBJECTIVE:     /70 (Cuff Size: Adult Regular)  Pulse 80  Temp 98  F (36.7  C) (Tympanic)  Resp 16  Wt 90 lb (40.8 kg)  LMP 10/15/2018  BMI 18.18 kg/m2  Body mass index is 18.18 kg/(m^2).    Physical Exam   Constitutional: She is oriented to person, place, and time. She appears well-developed and well-nourished. No distress.   HENT:   Head: Normocephalic.   Right Ear: External ear normal.   Left Ear: External ear normal.   Nose: Nose normal.   Eyes: Conjunctivae and EOM are normal.   Neck: Normal range of motion.   Pulmonary/Chest: Effort normal.   Neurological: She is alert and oriented to person, place, and time.   Skin: She is not diaphoretic.   Psychiatric: She has a normal mood and affect. Judgment normal.       No results found for this or any previous visit (from the past 24 hour(s)).    ASSESSMENT/PLAN:       ICD-10-CM    1. Hemorrhoids, unspecified hemorrhoid type K64.9    2. Vaginal bleeding N93.9    3. Need for prophylactic vaccination and inoculation against influenza Z23       - For the spotting, this may be because of the missed OCP dose on Sunday.  Continue taking OCP as scheduled, and if you spot again next month - follow up with OB/GYN provider.   - Continue home care for hemorrhoids.  I agree she should not take oxycodone.  Call and make an appointment with colorectal surgeon to discuss is hemorrhoid removal would be a good option for her.   - Flu shot given today.     Patient Instructions       Treating Hemorrhoids: Self-Care    Follow your healthcare provider s advice about caring for your hemorrhoids at home. Some treatments help relieve symptoms right away. Others involve making changes in your diet and exercise habits. These can help ease constipation and prevent hemorrhoid symptoms from coming back.  Relieving symptoms  Your healthcare provider may prescribe anti-inflammatory medicine to help ease your symptoms.  The following tips will also help relieve pain and swelling.    Take sitz baths. Taking a sitz bath means sitting in a few inches of warm bath water. Soaking for 10 minutes twice a day can provide welcome relief from painful hemorrhoids. It can also help the area stay clean.    Develop good bowel habits. Use the bathroom when you need to. Don t ignore the urge to move your bowels. This can lead to constipation, hard stools, and straining. Also, don t read while on the toilet. Sit only as long as needed. Wipe gently with soft, unscented toilet tissue or baby wipes.    Use ice packs. Placing an ice pack on a thrombosed external hemorrhoid can help relieve pain right away. It will also help reduce the blood clot. Use the ice for 15 to 20 minutes at a time. Keep a cloth between the ice and your skin to prevent skin damage.    Use other measures. Laxatives and enemas can help ease constipation. But use them only on your healthcare provider s advice. For symptom relief, try using cotton pads soaked in witch hazel. These are available at most drugstores. Over-the-counter hemorrhoid ointments and petroleum jelly can also provide relief.  Add fiber to your diet  Adding fiber to your diet can help relieve constipation by making stools softer and easier to pass. To increase your fiber intake, your healthcare provider may recommend a bulking agent, such as psyllium. This is a high-fiber supplement available at most grocery stores and drugstores. Eating more fiber-rich foods will also help. There are two types of fiber:    Insoluble fiber is the main ingredient in bulking agents. It s also found in foods such as wheat bran, whole-grain breads, fresh fruits, and vegetables.    Soluble fiber is found in foods such as oat bran. Although soluble fiber is good for you, it may not ease constipation as much as foods high in insoluble fiber.  Drink more water  Along with a high-fiber diet, drinking more water can help ease constipation.  This is because insoluble fiber absorbs water, making stools soft and bulky. Be sure to drink plenty of water throughout the day. Drinking fruit juices, such as prune juice or apple juice, can also help prevent constipation.  Get more exercise  Regular exercise aids digestion and helps prevent constipation. It s also great for your health. So talk with your healthcare provider about starting an exercise program. Low-impact activities, such as swimming or walking, are good places to start. Take it easy at first. And remember to drink plenty of water when you exercise.  High-fiber foods  High-fiber foods offer many benefits. By making your stools softer, they help heal and prevent swollen hemorrhoids. They may also help reduce the risk of colon and rectal cancer. Best of all, they re usually low in calories and taste great. Here are some examples of fiber-rich foods.    Whole grains, such as wheat bran, corn bran, and brown rice.    Vegetables, especially carrots, broccoli, cabbage, and peas.    Fruits, such as apples, bananas, raisins, peaches, and pears.    Nuts and legumes, especially peanuts, lentils, and kidney beans.  Easy ways to add fiber  The tips below offer some simple ways to add more high-fiber foods to your meals.    Start your day with a high-fiber breakfast. Eat a wheat bran cereal along with a sliced banana. Or, try peanut butter on whole-wheat toast.    Eat carrot sticks for snacks. They re easy to prepare, taste great, and are low in calories.    Use whole-grain breads instead of white bread for sandwiches.    Eat fruits for treats. Try an apple and some raisins instead of a candy bar.   Date Last Reviewed: 7/1/2016 2000-2017 [employer]. [address] [place] [address], [place] [address]. All rights reserved. This information is not intended as a substitute for professional medical care. Always follow your healthcare professional's instructions.      Stay well hydrated - find a electrolyte  drink       Barney Varner PA-C  [facility]

## 2018-11-01 NOTE — PROGRESS NOTES

## 2018-11-01 NOTE — PATIENT INSTRUCTIONS
Treating Hemorrhoids: Self-Care    Follow your healthcare provider s advice about caring for your hemorrhoids at home. Some treatments help relieve symptoms right away. Others involve making changes in your diet and exercise habits. These can help ease constipation and prevent hemorrhoid symptoms from coming back.  Relieving symptoms  Your healthcare provider may prescribe anti-inflammatory medicine to help ease your symptoms. The following tips will also help relieve pain and swelling.    Take sitz baths. Taking a sitz bath means sitting in a few inches of warm bath water. Soaking for 10 minutes twice a day can provide welcome relief from painful hemorrhoids. It can also help the area stay clean.    Develop good bowel habits. Use the bathroom when you need to. Don t ignore the urge to move your bowels. This can lead to constipation, hard stools, and straining. Also, don t read while on the toilet. Sit only as long as needed. Wipe gently with soft, unscented toilet tissue or baby wipes.    Use ice packs. Placing an ice pack on a thrombosed external hemorrhoid can help relieve pain right away. It will also help reduce the blood clot. Use the ice for 15 to 20 minutes at a time. Keep a cloth between the ice and your skin to prevent skin damage.    Use other measures. Laxatives and enemas can help ease constipation. But use them only on your healthcare provider s advice. For symptom relief, try using cotton pads soaked in witch hazel. These are available at most drugstores. Over-the-counter hemorrhoid ointments and petroleum jelly can also provide relief.  Add fiber to your diet  Adding fiber to your diet can help relieve constipation by making stools softer and easier to pass. To increase your fiber intake, your healthcare provider may recommend a bulking agent, such as psyllium. This is a high-fiber supplement available at most grocery stores and drugstores. Eating more fiber-rich foods will also help. There are two  types of fiber:    Insoluble fiber is the main ingredient in bulking agents. It s also found in foods such as wheat bran, whole-grain breads, fresh fruits, and vegetables.    Soluble fiber is found in foods such as oat bran. Although soluble fiber is good for you, it may not ease constipation as much as foods high in insoluble fiber.  Drink more water  Along with a high-fiber diet, drinking more water can help ease constipation. This is because insoluble fiber absorbs water, making stools soft and bulky. Be sure to drink plenty of water throughout the day. Drinking fruit juices, such as prune juice or apple juice, can also help prevent constipation.  Get more exercise  Regular exercise aids digestion and helps prevent constipation. It s also great for your health. So talk with your healthcare provider about starting an exercise program. Low-impact activities, such as swimming or walking, are good places to start. Take it easy at first. And remember to drink plenty of water when you exercise.  High-fiber foods  High-fiber foods offer many benefits. By making your stools softer, they help heal and prevent swollen hemorrhoids. They may also help reduce the risk of colon and rectal cancer. Best of all, they re usually low in calories and taste great. Here are some examples of fiber-rich foods.    Whole grains, such as wheat bran, corn bran, and brown rice.    Vegetables, especially carrots, broccoli, cabbage, and peas.    Fruits, such as apples, bananas, raisins, peaches, and pears.    Nuts and legumes, especially peanuts, lentils, and kidney beans.  Easy ways to add fiber  The tips below offer some simple ways to add more high-fiber foods to your meals.    Start your day with a high-fiber breakfast. Eat a wheat bran cereal along with a sliced banana. Or, try peanut butter on whole-wheat toast.    Eat carrot sticks for snacks. They re easy to prepare, taste great, and are low in calories.    Use whole-grain breads  instead of white bread for sandwiches.    Eat fruits for treats. Try an apple and some raisins instead of a candy bar.   Date Last Reviewed: 7/1/2016 2000-2017 [employer]. [address] [place] [address], [place] [address]. All rights reserved. This information is not intended as a substitute for professional medical care. Always follow your healthcare professional's instructions.      Stay well hydrated - find a electrolyte drink

## 2018-11-01 NOTE — MR AVS SNAPSHOT
After Visit Summary   11/1/2018    Carlos Eng    MRN: [id number]           Patient Information     Date Of Birth          1975        Visit Information        Provider Department      11/1/2018 9:10 AM Anna Varner PA-C [facility]        Today's Diagnoses     Screening for HIV (human immunodeficiency virus)        Need for prophylactic vaccination and inoculation against influenza          Care Instructions      Treating Hemorrhoids: Self-Care    Follow your healthcare provider s advice about caring for your hemorrhoids at home. Some treatments help relieve symptoms right away. Others involve making changes in your diet and exercise habits. These can help ease constipation and prevent hemorrhoid symptoms from coming back.  Relieving symptoms  Your healthcare provider may prescribe anti-inflammatory medicine to help ease your symptoms. The following tips will also help relieve pain and swelling.    Take sitz baths. Taking a sitz bath means sitting in a few inches of warm bath water. Soaking for 10 minutes twice a day can provide welcome relief from painful hemorrhoids. It can also help the area stay clean.    Develop good bowel habits. Use the bathroom when you need to. Don t ignore the urge to move your bowels. This can lead to constipation, hard stools, and straining. Also, don t read while on the toilet. Sit only as long as needed. Wipe gently with soft, unscented toilet tissue or baby wipes.    Use ice packs. Placing an ice pack on a thrombosed external hemorrhoid can help relieve pain right away. It will also help reduce the blood clot. Use the ice for 15 to 20 minutes at a time. Keep a cloth between the ice and your skin to prevent skin damage.    Use other measures. Laxatives and enemas can help ease constipation. But use them only on your healthcare provider s advice. For symptom relief, try using cotton pads soaked in witch hazel. These  are available at most drugstores. Over-the-counter hemorrhoid ointments and petroleum jelly can also provide relief.  Add fiber to your diet  Adding fiber to your diet can help relieve constipation by making stools softer and easier to pass. To increase your fiber intake, your healthcare provider may recommend a bulking agent, such as psyllium. This is a high-fiber supplement available at most grocery stores and drugstores. Eating more fiber-rich foods will also help. There are two types of fiber:    Insoluble fiber is the main ingredient in bulking agents. It s also found in foods such as wheat bran, whole-grain breads, fresh fruits, and vegetables.    Soluble fiber is found in foods such as oat bran. Although soluble fiber is good for you, it may not ease constipation as much as foods high in insoluble fiber.  Drink more water  Along with a high-fiber diet, drinking more water can help ease constipation. This is because insoluble fiber absorbs water, making stools soft and bulky. Be sure to drink plenty of water throughout the day. Drinking fruit juices, such as prune juice or apple juice, can also help prevent constipation.  Get more exercise  Regular exercise aids digestion and helps prevent constipation. It s also great for your health. So talk with your healthcare provider about starting an exercise program. Low-impact activities, such as swimming or walking, are good places to start. Take it easy at first. And remember to drink plenty of water when you exercise.  High-fiber foods  High-fiber foods offer many benefits. By making your stools softer, they help heal and prevent swollen hemorrhoids. They may also help reduce the risk of colon and rectal cancer. Best of all, they re usually low in calories and taste great. Here are some examples of fiber-rich foods.    Whole grains, such as wheat bran, corn bran, and brown rice.    Vegetables, especially carrots, broccoli, cabbage, and peas.    Fruits, such as  apples, bananas, raisins, peaches, and pears.    Nuts and legumes, especially peanuts, lentils, and kidney beans.  Easy ways to add fiber  The tips below offer some simple ways to add more high-fiber foods to your meals.    Start your day with a high-fiber breakfast. Eat a wheat bran cereal along with a sliced banana. Or, try peanut butter on whole-wheat toast.    Eat carrot sticks for snacks. They re easy to prepare, taste great, and are low in calories.    Use whole-grain breads instead of white bread for sandwiches.    Eat fruits for treats. Try an apple and some raisins instead of a candy bar.   Date Last Reviewed: 7/1/2016 2000-2017 [employer]. [address] [place] [address]. All rights reserved. This information is not intended as a substitute for professional medical care. Always follow your healthcare professional's instructions.      Stay well hydrated - find a electrolyte drink           Follow-ups after your visit        Follow-up notes from your care team     Return in about 1 week (around 11/8/2018) for colorectal specialist.      Who to contact     If you have questions or need follow up information about today's clinic visit or your schedule please contact [facility] directly at [phone].  Normal or non-critical lab and imaging results will be communicated to you by [employer], letter or phone within 4 business days after the clinic has received the results. If you do not hear from us within 7 days, please contact the clinic through [employer] or phone. If you have a critical or abnormal lab result, we will notify you by phone as soon as possible.  Submit refill requests through [employer] or call your pharmacy and they will forward the refill request to us. Please allow 3 business days for your refill to be completed.          Additional Information About Your Visit        Typeform Information     [employer] gives you secure access to your electronic  health record. If you see a primary care provider, you can also send messages to your care team and make appointments. If you have questions, please call your primary care clinic.  If you do not have a primary care provider, please call [phone] and they will assist you.        Care EveryWhere ID     This is your Care EveryWhere ID. This could be used by other organizations to access your [facility] medical records  [id number]        Your Vitals Were     Pulse Temperature Respirations Last Period BMI (Body Mass Index)       80 98  F (36.7  C) (Tympanic) 16 10/15/2018 18.18 kg/m2        Blood Pressure from Last 3 Encounters:   11/01/18 110/70   10/29/18 98/45   04/30/18 102/65    Weight from Last 3 Encounters:   11/01/18 90 lb (40.8 kg)   10/29/18 90 lb (40.8 kg)   12/15/17 95 lb (43.1 kg)              Today, you had the following     No orders found for display       Primary Care Provider Office Phone # Fax #    Tess Isabell Leiva [phone]       [address] [facility] [address]        Equal Access to Services     KRISTA RODRIGUEZ : Hadii aad ku hadasho Soreneeali, waaxda luqadaha, qaybta kaalmada adeegyada, marely quach. So [facility] [place] [facility] [phone].    ATENCIÓN: Si habla español, tiene a rock disposición servicios gratuitos de asistencia lingüística. [facility] [phone].    We comply with applicable federal civil rights laws and [place] laws. We do not discriminate on the basis of race, color, national origin, age, disability, sex, sexual orientation, or gender identity.            Thank you!     Thank you for choosing [facility]  for your care. Our goal is always to provide you with excellent care. Hearing back from our patients is one way we can continue to improve our services. Please take a few minutes to complete the written survey that you may receive in the mail after your visit with us. Thank you!             Your Updated  Medication List - Protect others around you: Learn how to safely use, store and throw away your medicines at www.disposemymeds.org.          This list is accurate as of 11/1/18  9:58 AM.  Always use your most recent med list.                   Brand Name Dispense Instructions for use Diagnosis    acetaminophen 500 MG tablet    TYLENOL    60 tablet    Take 2 tablets (1,000 mg) by mouth every 6 hours as needed for pain        atovaquone-proguanil 250-100 MG per tablet    MALARONE    30 tablet    Take 1 tablet by mouth daily Start 2 days before exposure to Malaria and continue daily till  7 days after exposure.    Travel advice encounter       cholecalciferol 5000 units Caps     90 capsule    Take 1 capsule by mouth daily.    Vitamin D deficiency disease       ibuprofen 200 MG tablet    ADVIL/MOTRIN    60 tablet    Take 2 tablets (400 mg) by mouth every 8 hours as needed for pain        lidocaine 5 % ointment    XYLOCAINE    30 g    Apply 1 g topically 3 times daily as needed for moderate pain        NO ACTIVE MEDICATIONS           norethindrone-ethinyl estradiol 1-20 MG-MCG per tablet    JUNEL FE 1/20    84 tablet    Take 1 tablet by mouth daily    Excessive or frequent menstruation       oxyCODONE IR 5 MG tablet    ROXICODONE    8 tablet    Take 1 tablet (5 mg) by mouth every 6 hours as needed for moderate to severe pain or severe pain        psyllium 0.52 g capsule     540 capsule    Take 1 capsule (0.52 g) by mouth daily        typhoid CR capsule    VIVOTIF    4 capsule    Take 1 capsule by mouth every other day    Need for vaccination, Travel advice encounter

## 2019-01-12 DIAGNOSIS — N92.0 EXCESSIVE OR FREQUENT MENSTRUATION: ICD-10-CM

## 2019-01-14 RX ORDER — NORETHINDRONE ACETATE AND ETHINYL ESTRADIOL 1MG-20(21)
KIT ORAL
Qty: 84 TABLET | Refills: 2 | Status: SHIPPED | OUTPATIENT
Start: 2019-01-14 | End: 2019-10-07

## 2019-01-14 NOTE — TELEPHONE ENCOUNTER
"Requested Prescriptions   Pending Prescriptions Disp Refills     BLISOVI FE 1/20 1-20 MG-MCG tablet [Pharmacy Med Name: Blisovi FE 1/20 Oral Tablet 1-20 MG-MCG]  Last Written Prescription Date:  10/22/2018  Last Fill Quantity: 84,  # refills: 0   Last office visit: 11/1/2018 with prescribing provider:  Barney   Future Office Visit:     84 tablet 0     Sig: TAKE 1 TABLET BY MOUTH DAILY    Contraceptives Protocol Passed - 1/12/2019  9:49 AM       Passed - Patient is not a current smoker if age is 35 or older       Passed - Recent (12 mo) or future (30 days) visit within the authorizing provider's specialty    Patient had office visit in the last 12 months or has a visit in the next 30 days with authorizing provider or within the authorizing provider's specialty.  See \"Patient Info\" tab in inbasket, or \"Choose Columns\" in Meds & Orders section of the refill encounter.             Passed - Medication is active on med list       Passed - No active pregnancy on record       Passed - No positive pregnancy test in past 12 months          "

## 2019-09-25 ENCOUNTER — OFFICE VISIT (OUTPATIENT)
Dept: FAMILY MEDICINE | Facility: CLINIC | Age: 44
End: 2019-09-25
Payer: COMMERCIAL

## 2019-09-25 VITALS
WEIGHT: 98 LBS | SYSTOLIC BLOOD PRESSURE: 100 MMHG | HEIGHT: 59 IN | HEART RATE: 66 BPM | RESPIRATION RATE: 20 BRPM | DIASTOLIC BLOOD PRESSURE: 60 MMHG | BODY MASS INDEX: 19.76 KG/M2 | OXYGEN SATURATION: 99 % | TEMPERATURE: 98.7 F

## 2019-09-25 DIAGNOSIS — R30.0 DYSURIA: ICD-10-CM

## 2019-09-25 DIAGNOSIS — Z80.0 FAMILY HISTORY OF COLON CANCER: ICD-10-CM

## 2019-09-25 DIAGNOSIS — N30.01 ACUTE CYSTITIS WITH HEMATURIA: Primary | ICD-10-CM

## 2019-09-25 DIAGNOSIS — Z23 NEED FOR PROPHYLACTIC VACCINATION AND INOCULATION AGAINST INFLUENZA: ICD-10-CM

## 2019-09-25 LAB
ALBUMIN UR-MCNC: NEGATIVE MG/DL
APPEARANCE UR: CLEAR
BILIRUB UR QL STRIP: NEGATIVE
COLOR UR AUTO: YELLOW
GLUCOSE UR STRIP-MCNC: NEGATIVE MG/DL
HGB UR QL STRIP: ABNORMAL
KETONES UR STRIP-MCNC: NEGATIVE MG/DL
LEUKOCYTE ESTERASE UR QL STRIP: ABNORMAL
NITRATE UR QL: NEGATIVE
NON-SQ EPI CELLS #/AREA URNS LPF: ABNORMAL /LPF
PH UR STRIP: 6.5 PH (ref 5–7)
RBC #/AREA URNS AUTO: ABNORMAL /HPF
SOURCE: ABNORMAL
SP GR UR STRIP: 1.01 (ref 1–1.03)
UROBILINOGEN UR STRIP-ACNC: 0.2 EU/DL (ref 0.2–1)
WBC #/AREA URNS AUTO: ABNORMAL /HPF

## 2019-09-25 PROCEDURE — 81001 URINALYSIS AUTO W/SCOPE: CPT | Performed by: FAMILY MEDICINE

## 2019-09-25 PROCEDURE — 99213 OFFICE O/P EST LOW 20 MIN: CPT | Mod: 25 | Performed by: FAMILY MEDICINE

## 2019-09-25 PROCEDURE — 90471 IMMUNIZATION ADMIN: CPT | Performed by: FAMILY MEDICINE

## 2019-09-25 PROCEDURE — 90686 IIV4 VACC NO PRSV 0.5 ML IM: CPT | Performed by: FAMILY MEDICINE

## 2019-09-25 PROCEDURE — 87086 URINE CULTURE/COLONY COUNT: CPT | Performed by: FAMILY MEDICINE

## 2019-09-25 RX ORDER — NITROFURANTOIN 25; 75 MG/1; MG/1
100 CAPSULE ORAL 2 TIMES DAILY
Qty: 10 CAPSULE | Refills: 0 | Status: SHIPPED | OUTPATIENT
Start: 2019-09-25 | End: 2019-10-08

## 2019-09-25 ASSESSMENT — MIFFLIN-ST. JEOR: SCORE: 1005.16

## 2019-09-25 NOTE — PATIENT INSTRUCTIONS
"  Patient Education     Bladder Infection, Female (Adult)    Urine is normally doesn't have any bacteria in it. But bacteria can get into the urinary tract from the skin around the rectum. Or they can travel in the blood from elsewhere in the body. Once they are in your urinary tract, they can cause infection in the urethra (urethritis), the bladder (cystitis), or the kidneys (pyelonephritis).  The most common place for an infection is in the bladder. This is called a bladder infection. This is one of the most common infections in women. Most bladder infections are easily treated. They are not serious unless the infection spreads to the kidney.  The phrases \"bladder infection,\" \"UTI,\" and \"cystitis\" are often used to describe the same thing. But they are not always the same. Cystitis is an inflammation of the bladder. The most common cause of cystitis is an infection.  Symptoms  The infection causes inflammation in the urethra and bladder. This causes many of the symptoms. The most common symptoms of a bladder infection are:    Pain or burning when urinating    Having to urinate more often than usual    Urgent need to urinate    Only a small amount of urine comes out    Blood in urine    Abdominal discomfort. This is usually in the lower abdomen above the pubic bone.    Cloudy urine    Strong- or bad-smelling urine    Unable to urinate (urinary retention)    Unable to hold urine in (urinary incontinence)    Fever    Loss of appetite    Confusion (in older adults)  Causes  Bladder infections are not contagious. You can't get one from someone else, from a toilet seat, or from sharing a bath.  The most common cause of bladder infections is bacteria from the bowels. The bacteria get onto the skin around the opening of the urethra. From there, they can get into the urine and travel up to the bladder, causing inflammation and infection. This usually happens because of:    Wiping improperly after urinating. Always wipe " from front to back.    Bowel incontinence    Pregnancy    Procedures such as having a catheter inserted    Older age    Not emptying your bladder. This can allow bacteria a chance to grow in your urine.    Dehydration    Constipation    Sex    Use of a diaphragm for birth control   Treatment  Bladder infections are diagnosed by a urine test. They are treated with antibiotics and usually clear up quickly without complications. Treatment helps prevent a more serious kidney infection.  Medicines  Medicines can help in the treatment of a bladder infection:    Take antibiotics until they are used up, even if you feel better. It is important to finish them to make sure the infection has cleared.    You can use acetaminophen or ibuprofen for pain, fever, or discomfort, unless another medicine was prescribed. If you have chronic liver or kidney disease, talk with your healthcare provider before using these medicines. Also talk with your provider if you've ever had a stomach ulcer or gastrointestinal bleeding, or are taking blood-thinner medicines.    If you are given phenazopydridine to reduce burning with urination, it will cause your urine to become a bright orange color. This can stain clothing.  Care and prevention  These self-care steps can help prevent future infections:    Drink plenty of fluids to prevent dehydration and flush out your bladder. Do this unless you must restrict fluids for other health reasons, or your doctor told you not to.    Proper cleaning after going to the bathroom is important. Wipe from front to back after using the toilet to prevent the spread of bacteria.    Urinate more often. Don't try to hold urine in for a long time.    Wear loose-fitting clothes and cotton underwear. Avoid tight-fitting pants.    Improve your diet and prevent constipation. Eat more fresh fruit and vegetables, and fiber, and less junk and fatty foods.    Avoid sex until your symptoms are gone.    Avoid caffeine,  alcohol, and spicy foods. These can irritate your bladder.    Urinate right after intercourse to flush out your bladder.    If you use birth control pills and have frequent bladder infections, discuss it with your doctor.  Follow-up care  Call your healthcare provider if all symptoms are not gone after 3 days of treatment. This is especially important if you have repeat infections.  If a culture was done, you will be told if your treatment needs to be changed. If directed, you can call to find out the results.  If X-rays were done, you will be told if the results will affect your treatment.  Call 911  Call 911 if any of the following occur:    Trouble breathing    Hard to wake up or confusion    Fainting or loss of consciousness    Rapid heart rate  When to seek medical advice  Call your healthcare provider right away if any of these occur:    Fever of 100.4 F (38.0 C) or higher, or as directed by your healthcare provider    Symptoms are not better by the third day of treatment    Back or belly (abdominal) pain that gets worse    Repeated vomiting, or unable to keep medicine down    Weakness or dizziness    Vaginal discharge    Pain, redness, or swelling in the outer vaginal area (labia)  Date Last Reviewed: 10/1/2016    4008-2118 [employer]. [address] [place] [address], [place] [address]. All rights reserved. This information is not intended as a substitute for professional medical care. Always follow your healthcare professional's instructions.

## 2019-09-25 NOTE — PROGRESS NOTES
"Subjective     Carlos Eng is a 43 year old female who presents to clinic today for the following health issues:    HPI   URINARY TRACT SYMPTOMS  Onset: couple days    Description:   Painful urination (Dysuria): YES           Frequency: YES  Blood in urine (Hematuria): YES- yesterday one time  Delay in urine (Hesitency): no     Intensity: moderate    Progression of Symptoms:  same    Accompanying Signs & Symptoms:  Fever/chills: no   Flank pain YES- intermittently on right side  Nausea and vomiting: no   Any vaginal symptoms: none  Abdominal/Pelvic Pain: YES- RLQ pain intermittently    History:   History of frequent UTI's: YES  History of kidney stones: no   Sexually Active: YES  Possibility of pregnancy: No    Precipitating factors:   none    Therapies Tried and outcome: none      On her period--started 9/15/19.  On OCP's to help control her period.    Reviewed and updated as needed this visit by Provider  Tobacco  Allergies  Meds  Problems  Med Hx  Surg Hx  Fam Hx         Review of Systems   ROS COMP: CONSTITUTIONAL: NEGATIVE for fever, chills, change in weight  INTEGUMENTARY/SKIN: NEGATIVE for worrisome rashes, moles or lesions  EYES: NEGATIVE for vision changes or irritation  ENT/MOUTH: NEGATIVE for ear, mouth and throat problems  RESP: NEGATIVE for significant cough or SOB  CV: NEGATIVE for chest pain, palpitations or peripheral edema  MUSCULOSKELETAL: NEGATIVE for significant arthralgias or myalgia  NEURO: NEGATIVE for weakness, dizziness or paresthesias  HEME: NEGATIVE for bleeding problems      Objective    /60 (BP Location: Left arm, Patient Position: Chair, Cuff Size: Adult Small)   Pulse 66   Temp 98.7  F (37.1  C)   Resp 20   Ht 1.499 m (4' 11\")   Wt 44.5 kg (98 lb)   LMP 09/15/2019   SpO2 99%   Breastfeeding? No   BMI 19.79 kg/m    Body mass index is 19.79 kg/m .  Physical Exam   GENERAL: Alert and no distress  EYES: Eyes grossly normal to inspection, PERRL and conjunctivae " and sclerae normal  NECK: no adenopathy, no asymmetry, masses, or scars and thyroid normal to palpation  RESP: lungs clear to auscultation - no rales, rhonchi or wheezes  CV: regular rate and rhythm, normal S1 S2, no S3 or S4, no murmur, click or rub, no peripheral edema and peripheral pulses strong  ABDOMEN: soft, nontender, no hepatosplenomegaly, no masses and bowel sounds normal.  +suprapubic tenderness  MS: no gross musculoskeletal defects noted, no edema  SKIN: no suspicious lesions or rashes  NEURO: Normal strength and tone, mentation intact and speech normal  PSYCH: mentation appears normal, affect normal/bright    Diagnostic Test Results:  Labs reviewed in Epic        Assessment & Plan   Problem List Items Addressed This Visit     Family history of colon cancer      Other Visit Diagnoses     Acute cystitis with hematuria    -  Primary    Relevant Medications    nitroFURantoin macrocrystal-monohydrate (MACROBID) 100 MG capsule    Dysuria        Relevant Orders    UA with Microscopic reflex to Culture (Completed)    Urine Culture Aerobic Bacterial (Completed)    Need for prophylactic vaccination and inoculation against influenza        Relevant Orders    INFLUENZA VACCINE IM > 6 MONTHS VALENT IIV4 [85565] (Completed)    Vaccine Administration, Initial [68599] (Completed)         Urinalysis may be positive for UTI.  Urine culture pending.  Rx Macrobid x 5 days; will stop if Ucx negative.    Recommended to keep up fluids and may use Tylenol and OTC Azo PRN bladder/dysuria pain as well.  Follow up as needed.    Desires flu shot    Sister recently diagnosed with colon CA at age 50; so this pt will need screening colonoscopy earlier.  Will schedule physical exam.    See Patient Instructions  Return in about 1 week (around 10/2/2019) for re-check / follow-up, If Not Getting Any Better.    Renetta Winter, DO  [facility]

## 2019-09-26 LAB
BACTERIA SPEC CULT: NO GROWTH
SPECIMEN SOURCE: NORMAL

## 2019-09-30 ENCOUNTER — HEALTH MAINTENANCE LETTER (OUTPATIENT)
Age: 44
End: 2019-09-30

## 2019-10-07 DIAGNOSIS — N92.0 EXCESSIVE OR FREQUENT MENSTRUATION: ICD-10-CM

## 2019-10-07 RX ORDER — NORETHINDRONE ACETATE AND ETHINYL ESTRADIOL 1MG-20(21)
KIT ORAL
Qty: 84 TABLET | Refills: 3 | Status: SHIPPED | OUTPATIENT
Start: 2019-10-07 | End: 2020-09-11

## 2019-10-08 ENCOUNTER — OFFICE VISIT (OUTPATIENT)
Dept: FAMILY MEDICINE | Facility: CLINIC | Age: 44
End: 2019-10-08
Payer: COMMERCIAL

## 2019-10-08 VITALS
DIASTOLIC BLOOD PRESSURE: 80 MMHG | RESPIRATION RATE: 16 BRPM | HEIGHT: 59 IN | SYSTOLIC BLOOD PRESSURE: 120 MMHG | TEMPERATURE: 98.5 F | HEART RATE: 72 BPM | WEIGHT: 99 LBS | BODY MASS INDEX: 19.96 KG/M2

## 2019-10-08 DIAGNOSIS — Z13.1 SCREENING FOR DIABETES MELLITUS: ICD-10-CM

## 2019-10-08 DIAGNOSIS — Z00.00 ROUTINE GENERAL MEDICAL EXAMINATION AT A HEALTH CARE FACILITY: Primary | ICD-10-CM

## 2019-10-08 DIAGNOSIS — E78.00 HYPERCHOLESTEROLEMIA: ICD-10-CM

## 2019-10-08 DIAGNOSIS — Z80.0 FAMILY HISTORY OF COLON CANCER: ICD-10-CM

## 2019-10-08 DIAGNOSIS — Z83.3 FAMILY HISTORY OF DIABETES MELLITUS: ICD-10-CM

## 2019-10-08 DIAGNOSIS — Z12.11 SPECIAL SCREENING FOR MALIGNANT NEOPLASMS, COLON: ICD-10-CM

## 2019-10-08 PROCEDURE — 80061 LIPID PANEL: CPT | Performed by: FAMILY MEDICINE

## 2019-10-08 PROCEDURE — 82947 ASSAY GLUCOSE BLOOD QUANT: CPT | Performed by: FAMILY MEDICINE

## 2019-10-08 PROCEDURE — 99396 PREV VISIT EST AGE 40-64: CPT | Performed by: FAMILY MEDICINE

## 2019-10-08 PROCEDURE — 36415 COLL VENOUS BLD VENIPUNCTURE: CPT | Performed by: FAMILY MEDICINE

## 2019-10-08 PROCEDURE — 84460 ALANINE AMINO (ALT) (SGPT): CPT | Performed by: FAMILY MEDICINE

## 2019-10-08 ASSESSMENT — MIFFLIN-ST. JEOR: SCORE: 1009.69

## 2019-10-08 NOTE — PATIENT INSTRUCTIONS
Preventive Health Recommendations  Female Ages 40 to 49    Yearly exam:     See your health care provider every year in order to  1. Review health changes.   2. Discuss preventive care.    3. Review your medicines if your doctor prescribed any.      Get a Pap test every three years (unless you have an abnormal result and your provider advises testing more often).      If you get Pap tests with HPV test, you only need to test every 5 years, unless you have an abnormal result. You do not need a Pap test if your uterus was removed (hysterectomy) and you have not had cancer.      You should be tested each year for STDs (sexually transmitted diseases), if you're at risk.     Ask your doctor if you should have a mammogram.      Have a colonoscopy (test for colon cancer) if someone in your family has had colon cancer or polyps before age 50.       Have a cholesterol test every 5 years.       Have a diabetes test (fasting glucose) after age 45. If you are at risk for diabetes, you should have this test every 3 years.    Shots: Get a flu shot each year. Get a tetanus shot every 10 years.     Nutrition:     Eat at least 5 servings of fruits and vegetables each day.    Eat whole-grain bread, whole-wheat pasta and brown rice instead of white grains and rice.    Get adequate Calcium and Vitamin D.      Lifestyle    Exercise at least 150 minutes a week (an average of 30 minutes a day, 5 days a week). This will help you control your weight and prevent disease.    Limit alcohol to one drink per day.    No smoking.     Wear sunscreen to prevent skin cancer.    See your dentist every six months for an exam and cleaning.    1. Please do your breast exam every mo, when you  Change the  calendar page or set an alarm on your cell phone Do a  visual check for dimples, inversion or indentation or any different position of the nipple Feel manually  for any 1cm or larger  size mass ie about the size of an almond Be sure to cover the entire  area of both breasts : this extends back to the back on either side and from the collar bone to the bottom of the breasts where you can begin to feel ribs.  Men are advised to do this exam also as they now have a higher rate of breast cancer , like women do .     2. . Schedule your mammo at [facility]  At [address] at [phone]      3. You need a colonoscopy at 45 yrs old as your sister had it at 50 yrs old

## 2019-10-08 NOTE — LETTER
October 10, 2019      Carlos Eng  [id number]  [facility] [place] [facility] [place] [address]        Dear ,    We are writing to inform you of your test results.    Please see attached lab results   All of your lab results are normal, except as noted below.     The following are explanations of some of our lab tests     THIS DOES NOT MEAN THAT YOU HAD ALL OF THESE DONE     Please continue on the same medications unless   a change is noted above     These are some general explanations for tests:     Hgb is the blood iron level   WBC means White Blood Cells   Platelets are small blood cells that help with forming the blood clots along with other blood factors.   Electrolytes ar   e Sodium, Potassium, Calcium, Magnesium, Phosphorus.   Liver tests are: AST, ALT, Bilirubin, Alkaline Phosphatase.   Kidney tests are Creatinine, GFR.   HDL Cholesterol - is the good cholesterol and it is good to have it high.   LDL cholesterol is the bad ch   olesterol and it is good to have it low.   It is recommended to have LDL less than 130 for people with hypertension and to have it less than 100 for people with heart disease, diabetes and chronic kidney disease.###moderately high ###still recommend diet and exercise ####     Triglycerides are another type of lipid a   nd can also cause heart disease so should be kept low.   Thyroid tests are TSH, T4, T3   Glucose is sugar   A1c is a test that gives us an idea about how well was controlled the diabetes for the last 3 months.   PSA stands for Prostate Specific Antigen and    it can be elevated with prostate cancer or prostate inflammation    Resulted Orders   GLUCOSE   Result Value Ref Range    Glucose 59 (L) 70 - 99 mg/dL   Lipid panel reflex to direct LDL Fasting   Result Value Ref Range    Cholesterol 195 <200 mg/dL    Triglycerides 55 <150 mg/dL    HDL Cholesterol 53 >49 mg/dL    LDL Cholesterol Calculated 131 (H) <100 mg/dL      Comment:      Above desirable:  100-129  mg/dl  Borderline High:  130-159 mg/dL  High:             160-189 mg/dL  Very high:       >189 mg/dl      Non HDL Cholesterol 142 (H) <130 mg/dL      Comment:      Above Desirable:  130-159 mg/dl  Borderline high:  160-189 mg/dl  High:             190-219 mg/dl  Very high:       >219 mg/dl     ALT   Result Value Ref Range    ALT 19 0 - 50 U/L       If you have any questions or concerns, please call the clinic at the number listed above.       Sincerely,        Tess Leiva MD

## 2019-10-08 NOTE — PROGRESS NOTES
SUBJECTIVE:   CC: Carlos Eng is an 43 year old woman who presents for preventive health visit.     Healthy Habits:     Getting at least 3 servings of Calcium per day:  NO    Bi-annual eye exam:  Yes    Dental care twice a year:  Yes    Sleep apnea or symptoms of sleep apnea:  None    Diet:  Regular (no restrictions) and Breakfast skipped    Frequency of exercise:  None    Taking medications regularly:  Yes    Medication side effects:  None    PHQ-2 Total Score: 0    Additional concerns today:  No        Today's PHQ-2 Score:   PHQ-2 ( 1999 [employer]) 10/8/2019   Q1: Little interest or pleasure in doing things 0   Q2: Feeling down, depressed or hopeless 0   PHQ-2 Score 0   Q1: Little interest or pleasure in doing things Not at all   Q2: Feeling down, depressed or hopeless Not at all   PHQ-2 Score 0       Abuse: Current or Past(Physical, Sexual or Emotional)- No  Do you feel safe in your environment? Yes    Social History     Tobacco Use     Smoking status: Never Smoker     Smokeless tobacco: Never Used   Substance Use Topics     Alcohol use: Yes     Comment: Occ     If you drink alcohol do you typically have >3 drinks per day or >7 drinks per week? No    Alcohol Use 10/8/2019   Prescreen: >3 drinks/day or >7 drinks/week? No   Prescreen: >3 drinks/day or >7 drinks/week? -   No flowsheet data found.    Reviewed orders with patient.  Reviewed health maintenance and updated orders accordingly - Yes  Labs reviewed in [facility]    Mammogram Screening: Patient under age 50, mutual decision reflected in health maintenance.      Pertinent mammograms are reviewed under the imaging tab.  History of abnormal Pap smear: NO - age 30- 65 PAP every 3 years recommended  PAP / HPV Latest Ref Rng & Units 4/22/2016 6/20/2013   PAP - NIL NIL   HPV 16 DNA NEG Negative -   HPV 18 DNA NEG Negative -   OTHER HR HPV NEG Negative -     Reviewed and updated as needed this visit by clinical staff  Tobacco  Allergies  Meds  Med Hx  Surg Hx   "Fam Hx  Soc Hx        Reviewed and updated as needed this visit by Provider      FAMILY  HX COLON CA    -recent in sister of 51 y/o --getting chemo   - pt no change in bowel habits or bleeding       Review of Systems  CONSTITUTIONAL: NEGATIVE for fever, chills, change in weight  INTEGUMENTARU/SKIN: NEGATIVE for worrisome rashes, moles or lesions  EYES: NEGATIVE for vision changes or irritation  ENT: NEGATIVE for ear, mouth and throat problems  RESP: NEGATIVE for significant cough or SOB  BREAST: NEGATIVE for masses, tenderness or discharge  CV: NEGATIVE for chest pain, palpitations or peripheral edema  GI: NEGATIVE for nausea, abdominal pain, heartburn, or change in bowel habits  : NEGATIVE for unusual urinary or vaginal symptoms. Periods are regular.  MUSCULOSKELETAL: NEGATIVE for significant arthralgias or myalgia  NEURO: NEGATIVE for weakness, dizziness or paresthesias  ENDOCRINE: NEGATIVE for temperature intolerance, skin/hair changes  HEME/ALLERGY/IMMUNE: NEGATIVE for bleeding problems  PSYCHIATRIC: NEGATIVE for changes in mood or affect     OBJECTIVE:   /80 (Cuff Size: Adult Regular)   Pulse 72   Temp 98.5  F (36.9  C) (Tympanic)   Resp 16   Ht 1.499 m (4' 11\")   Wt 44.9 kg (99 lb)   LMP 09/15/2019   BMI 20.00 kg/m    Physical Exam  GENERAL: healthy, alert and no distress  EYES: Eyes grossly normal to inspection, PERRL and conjunctivae and sclerae normal  NECK: no adenopathy, no asymmetry, masses, or scars and thyroid normal to palpation  RESP: lungs clear to auscultation - no rales, rhonchi or wheezes  BREAST: normal without masses, tenderness or nipple discharge and no palpable axillary masses or adenopathy  CV: regular rate and rhythm, normal S1 S2, no S3 or S4, no murmur, click or rub, no peripheral edema and peripheral pulses strong  ABDOMEN: soft, nontender, no hepatosplenomegaly, no masses and bowel sounds normal  MS: no gross musculoskeletal defects noted, no edema  SKIN: no suspicious " "lesions or rashes  NEURO: Normal strength and tone, mentation intact and speech normal  PSYCH: mentation appears normal, affect normal/bright  LYMPH: no cervical, supraclavicular, axillary, or inguinal adenopathy    Diagnostic Test Results:  Labs reviewed in Epic  Results for orders placed or performed in visit on 09/25/19   UA with Microscopic reflex to Culture   Result Value Ref Range    Color Urine Yellow     Appearance Urine Clear     Glucose Urine Negative NEG^Negative mg/dL    Bilirubin Urine Negative NEG^Negative    Ketones Urine Negative NEG^Negative mg/dL    Specific Gravity Urine 1.015 1.003 - 1.035    pH Urine 6.5 5.0 - 7.0 pH    Protein Albumin Urine Negative NEG^Negative mg/dL    Urobilinogen Urine 0.2 0.2 - 1.0 EU/dL    Nitrite Urine Negative NEG^Negative    Blood Urine Moderate (A) NEG^Negative    Leukocyte Esterase Urine Trace (A) NEG^Negative    Source Midstream Urine     WBC Urine 0 - 5 OTO5^0 - 5 /HPF    RBC Urine O - 2 OTO2^O - 2 /HPF    Squamous Epithelial /LPF Urine Few FEW^Few /LPF   Urine Culture Aerobic Bacterial   Result Value Ref Range    Specimen Description Midstream Urine     Culture Micro No growth        ASSESSMENT/PLAN:       ICD-10-CM    1. Routine general medical examination at a health care facility Z00.00    2. Hypercholesterolemia E78.00 Lipid panel reflex to direct LDL Fasting     ALT   3. Family history of diabetes mellitus Z83.3 GLUCOSE   4. Screening for diabetes mellitus Z13.1 GLUCOSE   5. Special screening for malignant neoplasms, colon Z12.11 Fecal colorectal cancer screen FIT   6. Family history of colon cancer- sister at 51y/o  Z80.0        COUNSELING:  Reviewed preventive health counseling, as reflected in patient instructions       Regular exercise       Healthy diet/nutrition    Estimated body mass index is 20 kg/m  as calculated from the following:    Height as of this encounter: 1.499 m (4' 11\").    Weight as of this encounter: 44.9 kg (99 lb).    Weight management " plan: Discussed healthy diet and exercise guidelines     reports that she has never smoked. She has never used smokeless tobacco.     Patient Instructions     Preventive Health Recommendations  Female Ages 40 to 49    Yearly exam:     See your health care provider every year in order to  1. Review health changes.   2. Discuss preventive care.    3. Review your medicines if your doctor prescribed any.      Get a Pap test every three years (unless you have an abnormal result and your provider advises testing more often).      If you get Pap tests with HPV test, you only need to test every 5 years, unless you have an abnormal result. You do not need a Pap test if your uterus was removed (hysterectomy) and you have not had cancer.      You should be tested each year for STDs (sexually transmitted diseases), if you're at risk.     Ask your doctor if you should have a mammogram.      Have a colonoscopy (test for colon cancer) if someone in your family has had colon cancer or polyps before age 50.       Have a cholesterol test every 5 years.       Have a diabetes test (fasting glucose) after age 45. If you are at risk for diabetes, you should have this test every 3 years.    Shots: Get a flu shot each year. Get a tetanus shot every 10 years.     Nutrition:     Eat at least 5 servings of fruits and vegetables each day.    Eat whole-grain bread, whole-wheat pasta and brown rice instead of white grains and rice.    Get adequate Calcium and Vitamin D.      Lifestyle    Exercise at least 150 minutes a week (an average of 30 minutes a day, 5 days a week). This will help you control your weight and prevent disease.    Limit alcohol to one drink per day.    No smoking.     Wear sunscreen to prevent skin cancer.    See your dentist every six months for an exam and cleaning.    1. Please do your breast exam every mo, when you  Change the  calendar page or set an alarm on your cell phone Do a  visual check for dimples, inversion or  indentation or any different position of the nipple Feel manually  for any 1cm or larger  size mass ie about the size of an almond Be sure to cover the entire area of both breasts : this extends back to the back on either side and from the collar bone to the bottom of the breasts where you can begin to feel ribs.  Men are advised to do this exam also as they now have a higher rate of breast cancer , like women do .     2. . Schedule your mammo at [facility]  At [address] at [phone]      3. You need a colonoscopy at 45 yrs old as your sister had it at 50 yrs old             Counseling Resources:  ATP IV Guidelines  Pooled Cohorts Equation Calculator  Breast Cancer Risk Calculator  FRAX Risk Assessment  ICSI Preventive Guidelines  Dietary Guidelines for Americans, 2010  USDA's MyPlate  ASA Prophylaxis  Lung CA Screening    Tess Leiva MD  [facility]

## 2019-10-09 LAB
ALT SERPL W P-5'-P-CCNC: 19 U/L (ref 0–50)
CHOLEST SERPL-MCNC: 195 MG/DL
GLUCOSE SERPL-MCNC: 59 MG/DL (ref 70–99)
HDLC SERPL-MCNC: 53 MG/DL
LDLC SERPL CALC-MCNC: 131 MG/DL
NONHDLC SERPL-MCNC: 142 MG/DL
TRIGL SERPL-MCNC: 55 MG/DL

## 2019-10-10 NOTE — RESULT ENCOUNTER NOTE
Please see attached lab results  All of your lab results are normal, except as noted below.    The following are explanations of some of our lab tests    THIS DOES NOT MEAN THAT YOU HAD ALL OF THESE DONE    Please continue on the same medications unless   a change is noted above    These are some general explanations for tests:    Hgb is the blood iron level  WBC means White Blood Cells  Platelets are small blood cells that help with forming the blood clots along with other blood factors.  Electrolytes ar  e Sodium, Potassium, Calcium, Magnesium, Phosphorus.  Liver tests are: AST, ALT, Bilirubin, Alkaline Phosphatase.  Kidney tests are Creatinine, GFR.  HDL Cholesterol - is the good cholesterol and it is good to have it high.  LDL cholesterol is the bad ch  olesterol and it is good to have it low.  It is recommended to have LDL less than 130 for people with hypertension and to have it less than 100 for people with heart disease, diabetes and chronic kidney disease.###moderately high ###still recommend diet and exercise ####    Triglycerides are another type of lipid a  nd can also cause heart disease so should be kept low.   Thyroid tests are TSH, T4, T3  Glucose is sugar  A1c is a test that gives us an idea about how well was controlled the diabetes for the last 3 months.   PSA stands for Prostate Specific Antigen and   it can be elevated with prostate cancer or prostate inflammation.

## 2019-10-11 DIAGNOSIS — Z12.11 SPECIAL SCREENING FOR MALIGNANT NEOPLASMS, COLON: ICD-10-CM

## 2019-10-13 PROCEDURE — 82274 ASSAY TEST FOR BLOOD FECAL: CPT | Performed by: FAMILY MEDICINE

## 2019-10-19 LAB — HEMOCCULT STL QL IA: NEGATIVE

## 2019-12-15 ENCOUNTER — HEALTH MAINTENANCE LETTER (OUTPATIENT)
Age: 44
End: 2019-12-15

## 2020-01-15 ENCOUNTER — TELEPHONE (OUTPATIENT)
Dept: FAMILY MEDICINE | Facility: CLINIC | Age: 45
End: 2020-01-15

## 2020-01-15 DIAGNOSIS — Z23 NEED FOR VACCINATION: ICD-10-CM

## 2020-01-15 DIAGNOSIS — Z71.84 TRAVEL ADVICE ENCOUNTER: ICD-10-CM

## 2020-01-15 NOTE — TELEPHONE ENCOUNTER
Reason for Call:  Other returning call    Detailed comments: Carlos returning call to triage    Phone Number Patient can be reached at: Cell number on file:    Telephone Information:   Mobile [phone]       Best Time: any    Can we leave a detailed message on this number? YES    Call taken on 1/15/2020 at 11:22 AM by Jose Luis Lance

## 2020-01-15 NOTE — TELEPHONE ENCOUNTER
LH  Pt came in for travel to [facility] 4/3/18  The trip was cancelled so they never went and never picked up Rxs  The are now going, same exact place and itinerary.     Ok to send in new Rxs? Pended.  Please advise,  Xin Hammer, RN

## 2020-01-15 NOTE — TELEPHONE ENCOUNTER
LDVM- if pt took the oral typhoid when prescribed back in 4/2018 then she does not need to repeat the dose at this point. It is good for 5 years.     Did she take the typhoid at the time or did she forget to take the Rx?    Call back to Xin in triage to confirm.    Xin Hammer RN

## 2020-01-15 NOTE — TELEPHONE ENCOUNTER
Reason for Call:  Medication or medication refill:    Do you use a [facility]?  Name of the pharmacy and phone number for the current request:      [id number] [address]    Name of the medication requested: typhoid (VIVOTIF) CR capsule    Other request: Pt is now leaving for her trip on the 27th. She tried to refill but the Pharmacist says that it's too old.     She wonders if this can be refill?    Please call back. Pt knows that her last OV for travel was 4/30/18.    Can we leave a detailed message on this number? YES    Phone number patient can be reached at: Cell number on file:    Telephone Information:   Mobile [phone]       Best Time: any    Call taken on 1/15/2020 at 9:33 AM by Jose Luis Lance

## 2020-01-16 DIAGNOSIS — Z71.84 TRAVEL ADVICE ENCOUNTER: ICD-10-CM

## 2020-01-16 DIAGNOSIS — Z23 NEED FOR VACCINATION: ICD-10-CM

## 2020-01-17 RX ORDER — ATOVAQUONE AND PROGUANIL HYDROCHLORIDE 250; 100 MG/1; MG/1
1 TABLET, FILM COATED ORAL DAILY
Start: 2020-01-17

## 2020-01-17 RX ORDER — TYPHOID VACC,LIVE,ATTENUATED 2B UNIT
CAPSULE,DELAYED RELEASE (ENTERIC COATED) ORAL
Start: 2020-01-17

## 2020-01-17 RX ORDER — AZITHROMYCIN 500 MG/1
TABLET, FILM COATED ORAL
Start: 2020-01-17

## 2020-01-19 RX ORDER — AZITHROMYCIN 500 MG/1
500 TABLET, FILM COATED ORAL DAILY
Qty: 3 TABLET | Refills: 0 | Status: SHIPPED | OUTPATIENT
Start: 2020-01-19 | End: 2020-09-01

## 2020-01-19 RX ORDER — ATOVAQUONE AND PROGUANIL HYDROCHLORIDE 250; 100 MG/1; MG/1
1 TABLET, FILM COATED ORAL DAILY
Qty: 30 TABLET | Refills: 0 | Status: SHIPPED | OUTPATIENT
Start: 2020-01-19 | End: 2020-09-01

## 2020-01-21 NOTE — TELEPHONE ENCOUNTER
Patient calling , [place] about the [place] that is where she iis Traveling, Wants advise, please call her at [phone] she is leaving Monday

## 2020-01-23 ENCOUNTER — OFFICE VISIT (OUTPATIENT)
Dept: FAMILY MEDICINE | Facility: CLINIC | Age: 45
End: 2020-01-23
Payer: COMMERCIAL

## 2020-01-23 VITALS
BODY MASS INDEX: 20.36 KG/M2 | WEIGHT: 101 LBS | RESPIRATION RATE: 20 BRPM | HEIGHT: 59 IN | HEART RATE: 75 BPM | OXYGEN SATURATION: 99 % | TEMPERATURE: 98 F | DIASTOLIC BLOOD PRESSURE: 70 MMHG | SYSTOLIC BLOOD PRESSURE: 110 MMHG

## 2020-01-23 DIAGNOSIS — R09.82 POST-NASAL DRIP: ICD-10-CM

## 2020-01-23 DIAGNOSIS — H65.02 NON-RECURRENT ACUTE SEROUS OTITIS MEDIA OF LEFT EAR: ICD-10-CM

## 2020-01-23 DIAGNOSIS — J01.90 ACUTE NON-RECURRENT SINUSITIS, UNSPECIFIED LOCATION: Primary | ICD-10-CM

## 2020-01-23 DIAGNOSIS — R07.0 THROAT PAIN: ICD-10-CM

## 2020-01-23 DIAGNOSIS — R05.9 COUGH: ICD-10-CM

## 2020-01-23 PROCEDURE — 99214 OFFICE O/P EST MOD 30 MIN: CPT | Performed by: FAMILY MEDICINE

## 2020-01-23 RX ORDER — AZITHROMYCIN 250 MG/1
TABLET, FILM COATED ORAL
Qty: 6 TABLET | Refills: 0 | Status: SHIPPED | OUTPATIENT
Start: 2020-01-23 | End: 2020-01-28

## 2020-01-23 ASSESSMENT — MIFFLIN-ST. JEOR: SCORE: 1013.76

## 2020-01-23 NOTE — PROGRESS NOTES
"Subjective     Carlos Eng is a 44 year old female who presents to clinic today for the following health issues:    HPI     ENT Symptoms             Symptoms: cc Present Absent Comment   Fever/Chills   x    Fatigue   x    Muscle Aches   x    Eye Irritation   x    Sneezing   x    Nasal Feng/Drg  x     Sinus Pressure/Pain  x     Loss of smell   x    Dental pain   x    Sore Throat   x    Swollen Glands   x    Ear Pain/Fullness  Lt     Cough  x     Wheeze   x    Chest Pain   x    Shortness of breath   x    Rash   x    Other   x      Symptom duration:  2-3 weeks   Symptom severity:  Mild   Treatments tried:  Dayquil, Theraflu   Contacts:  - had it -getting better   Going to [facility] for a mo In  4 d                Reviewed and updated as needed this visit by Provider         Review of Systems   ROS COMP: CONSTITUTIONAL: NEGATIVE for fever, chills, change in weight  INTEGUMENTARY/SKIN: NEGATIVE for worrisome rashes, moles or lesions  EYES: NEGATIVE for vision changes or irritation  ENT/MOUTH: POSITIVE for , earache left, hoarse voice, nasal congestion, postnasal drainage, rhinorrhea-clear, sinus pressure and sore throat  RESP:POSITIVE for  and cough-non productive  BREAST: NEGATIVE for masses, tenderness or discharge  CV: NEGATIVE for chest pain, palpitations or peripheral edema  GI: NEGATIVE for nausea, abdominal pain, heartburn, or change in bowel habits  : NEGATIVE for frequency, dysuria, or hematuria  MUSCULOSKELETAL: NEGATIVE for significant arthralgias or myalgia  NEURO: NEGATIVE for weakness, dizziness or paresthesias  ENDOCRINE: NEGATIVE for temperature intolerance, skin/hair changes  HEME: NEGATIVE for bleeding problems  PSYCHIATRIC: NEGATIVE for changes in mood or affect      Objective    /70   Pulse 75   Temp 98  F (36.7  C) (Tympanic)   Resp 20   Ht 1.499 m (4' 11\")   Wt 45.8 kg (101 lb)   LMP  (LMP Unknown)   SpO2 99%   Breastfeeding No   BMI 20.40 kg/m    Body mass index is 20.4 " kg/m .  Physical Exam   GENERAL: healthy, alert and no distress  EYES: Eyes grossly normal to inspection, PERRL and conjunctivae and sclerae normal  HENT: ear canals and TM's normal, nose and mouth without ulcers or lesions  NECK: no adenopathy, no asymmetry, masses, or scars and thyroid normal to palpation  RESP: lungs clear to auscultation - no rales, rhonchi or wheezes  CV: regular rate and rhythm, normal S1 S2, no S3 or S4, no murmur, click or rub, no peripheral edema and peripheral pulses strong  MS: no gross musculoskeletal defects noted, no edema  SKIN: no suspicious lesions or rashes  NEURO: Normal strength and tone, mentation intact and speech normal  PSYCH: mentation appears normal, affect normal/bright  LYMPH: no cervical, supraclavicular,  adenopathy    Diagnostic Test Results:  Labs reviewed in Epic        Assessment & Plan       ICD-10-CM    1. Acute non-recurrent sinusitis, unspecified location J01.90 azithromycin (ZITHROMAX) 250 MG tablet   2. Throat pain R07.0    3. Post-nasal drip R09.82    4. Non-recurrent acute serous otitis media of left ear H65.02 azithromycin (ZITHROMAX) 250 MG tablet   5. Cough R05           Patient Instructions   1.  Run a cold air vaporizer as much as possible. If you cannot,  boil water and breath the warm vapors 2-3 times a day to try to open up the sinuses take 2400mgm of guaifenesin per 24 hours   You can do this by taking  Mucinex plain blue  1200 mg  One tablet twice a day (This may come as 600mg/tablet and you need to take 2 tabs twice a day) or you could buy the cheaper  generic 400mgm / tab and take 2 tablets 3 x a day or 1 and 1/2 tablets 4 x a day . .Guaifenesin is  the major component of most cough syrups, because it makes the mucus less thick, and therefore it drains out better and you are less likely to cough from it dripping on the back of your throat.  Irrigate the  nose with plain water under the kitchen sink faucet or the shower.  Mandy pots, spray  bottles, etc accumulate bacteria and are not recommended.   The tickle in the throat is also helped by gargling with vinegar and honey mixture, or pop or mouth wash as these coat the throat.  Please try to rinse teeth with water after using these .   Do not use sudafed or pseudephedrine as it dries the mucus up so it is harder to get it out, and it can raise your BP           Return in about 6 months (around 7/23/2020) for Physical Exam.    Tess Leiva MD  [facility]

## 2020-01-23 NOTE — PATIENT INSTRUCTIONS
1.  Run a cold air vaporizer as much as possible. If you cannot,  boil water and breath the warm vapors 2-3 times a day to try to open up the sinuses take 2400mgm of guaifenesin per 24 hours   You can do this by taking  Mucinex plain blue  1200 mg  One tablet twice a day (This may come as 600mg/tablet and you need to take 2 tabs twice a day) or you could buy the cheaper  generic 400mgm / tab and take 2 tablets 3 x a day or 1 and 1/2 tablets 4 x a day . .Guaifenesin is  the major component of most cough syrups, because it makes the mucus less thick, and therefore it drains out better and you are less likely to cough from it dripping on the back of your throat.  Irrigate the  nose with plain water under the kitchen sink faucet or the shower.  Mandy pots, spray bottles, etc accumulate bacteria and are not recommended.   The tickle in the throat is also helped by gargling with vinegar and honey mixture, or pop or mouth wash as these coat the throat.  Please try to rinse teeth with water after using these .   Do not use sudafed or pseudephedrine as it dries the mucus up so it is harder to get it out, and it can raise your BP

## 2020-02-28 ENCOUNTER — NURSE TRIAGE (OUTPATIENT)
Dept: FAMILY MEDICINE | Facility: CLINIC | Age: 45
End: 2020-02-28

## 2020-02-28 NOTE — TELEPHONE ENCOUNTER
Reason for Call:  Other call back    Detailed comments: Carlos and her family just arrived home from [place] and [place], and [place]. Carlos and family  Have runny noses. No fever.    She asks a nurse to return a call to her.  Her employer sent her home for 14 days, due to company policy.    Phone Number Patient can be reached at: Cell number on file:    Telephone Information:   Mobile [phone]       Best Time: any    Can we leave a detailed message on this number? YES    Call taken on 2/28/2020 at 8:18 AM by Kamini Butterfield

## 2020-02-28 NOTE — TELEPHONE ENCOUNTER
RN called back to pt.    Both daughter and pt have runny noses.  has a HA. No fever, no cough.    Started traveling 01/27/20- 02/25/20.[place] (2 weeks) and [place] (2 weeks) and [place] ([place], just stayed in the airport).    States no travel to [place] and no contact with known infected persons.    At this time, RN advised pt to monitor sx at home. Specifically mentioned to monitor for fever and cough. Advised for pt to take precautions such as hand washing and covering coughs and sneezes. Advised to push fluids, symptomatic management, and get plenty of rest.    Provider- please advise if there are any other steps that need to be taken with travel to [place]. [place] are both not of travel concern per  Dept of State.    Additional Information    Negative: Sounds like a life-threatening emergency to the triager    Negative: Difficulty breathing, and not from stuffy nose (e.g., not relieved by cleaning out the nose)    Negative: SEVERE headache and has fever    Negative: Patient sounds very sick or weak to the triager    Negative: SEVERE sinus pain    Negative: Severe headache    Negative: Redness or swelling on the cheek, forehead, or around the eye    Negative: Fever > 103 F (39.4 C)    Negative: Fever > 101 F (38.3 C) and over 60 years of age    Negative: Fever > 100.0 F (37.8 C) and has diabetes mellitus or a weak immune system (e.g., HIV positive, cancer chemotherapy, organ transplant, splenectomy, chronic steroids)    Negative: Fever > 100.0 F (37.8 C) and bedridden (e.g., nursing home patient, stroke, chronic illness, recovering from surgery)    Negative: Fever present > 3 days (72 hours)    Negative: Fever returns after gone for over 24 hours and symptoms worse or not improved    Negative: Sinus pain (not just congestion) and fever    Negative: Earache    Negative: Sinus congestion (pressure, fullness) present > 10 days    Negative: Nasal discharge present > 10 days    Negative: Using nasal  "washes and pain medicine > 24 hours and sinus pain (lower forehead, cheekbone, or eye) persists    Negative: Lots of coughing    Negative: Patient wants to be seen    Sinus congestion as part of a cold, present < 10 days    Answer Assessment - Initial Assessment Questions  1. LOCATION: \"Where does it hurt?\"       No pain    2. ONSET: \"When did the sinus pain start?\"  (e.g., hours, days)       No pain, just runny nose    3. SEVERITY: \"How bad is the pain?\"   (Scale 1-10; mild, moderate or severe)    - MILD (1-3): doesn't interfere with normal activities     - MODERATE (4-7): interferes with normal activities (e.g., work or school) or awakens from sleep    - SEVERE (8-10): excruciating pain and patient unable to do any normal activities         Mild    4. RECURRENT SYMPTOM: \"Have you ever had sinus problems before?\" If so, ask: \"When was the last time?\" and \"What happened that time?\"       No    5. NASAL CONGESTION: \"Is the nose blocked?\" If so, ask, \"Can you open it or must you breathe through the mouth?\"      Just runny    6. NASAL DISCHARGE: \"Do you have discharge from your nose?\" If so ask, \"What color?\"      Clear discharge    7. FEVER: \"Do you have a fever?\" If so, ask: \"What is it, how was it measured, and when did it start?\"       No    8. OTHER SYMPTOMS: \"Do you have any other symptoms?\" (e.g., sore throat, cough, earache, difficulty breathing)      HA    9. PREGNANCY: \"Is there any chance you are pregnant?\" \"When was your last menstrual period?\"      no    Protocols used: SINUS PAIN AND CONGESTION-A-OH      "

## 2020-09-08 ENCOUNTER — MYC MEDICAL ADVICE (OUTPATIENT)
Dept: FAMILY MEDICINE | Facility: CLINIC | Age: 45
End: 2020-09-08

## 2020-09-08 DIAGNOSIS — R30.0 DYSURIA: Primary | ICD-10-CM

## 2020-09-08 NOTE — TELEPHONE ENCOUNTER
Urine culture was negative for UTI.  So would recommend that she get her urine re-tested before going back on abx.

## 2020-09-16 ENCOUNTER — OFFICE VISIT (OUTPATIENT)
Dept: FAMILY MEDICINE | Facility: CLINIC | Age: 45
End: 2020-09-16
Payer: COMMERCIAL

## 2020-09-16 VITALS
HEIGHT: 59 IN | DIASTOLIC BLOOD PRESSURE: 64 MMHG | OXYGEN SATURATION: 100 % | HEART RATE: 67 BPM | WEIGHT: 100 LBS | BODY MASS INDEX: 20.16 KG/M2 | TEMPERATURE: 98 F | RESPIRATION RATE: 16 BRPM | SYSTOLIC BLOOD PRESSURE: 98 MMHG

## 2020-09-16 DIAGNOSIS — E78.00 HYPERCHOLESTEROLEMIA: ICD-10-CM

## 2020-09-16 DIAGNOSIS — Z23 NEED FOR PROPHYLACTIC VACCINATION AND INOCULATION AGAINST INFLUENZA: ICD-10-CM

## 2020-09-16 DIAGNOSIS — Z12.4 CERVICAL CANCER SCREENING: ICD-10-CM

## 2020-09-16 DIAGNOSIS — Z13.1 SCREENING FOR DIABETES MELLITUS: ICD-10-CM

## 2020-09-16 DIAGNOSIS — Z00.00 ENCOUNTER FOR WELL ADULT EXAM WITHOUT ABNORMAL FINDINGS: Primary | ICD-10-CM

## 2020-09-16 DIAGNOSIS — R30.0 DYSURIA: ICD-10-CM

## 2020-09-16 LAB
ALBUMIN UR-MCNC: NEGATIVE MG/DL
APPEARANCE UR: CLEAR
BILIRUB UR QL STRIP: NEGATIVE
COLOR UR AUTO: YELLOW
GLUCOSE UR STRIP-MCNC: NEGATIVE MG/DL
HGB UR QL STRIP: ABNORMAL
KETONES UR STRIP-MCNC: NEGATIVE MG/DL
LEUKOCYTE ESTERASE UR QL STRIP: NEGATIVE
NITRATE UR QL: NEGATIVE
NON-SQ EPI CELLS #/AREA URNS LPF: ABNORMAL /LPF
PH UR STRIP: 6.5 PH (ref 5–7)
RBC #/AREA URNS AUTO: ABNORMAL /HPF
SOURCE: ABNORMAL
SP GR UR STRIP: 1.01 (ref 1–1.03)
SPECIMEN SOURCE: ABNORMAL
UROBILINOGEN UR STRIP-ACNC: 0.2 EU/DL (ref 0.2–1)
WBC #/AREA URNS AUTO: ABNORMAL /HPF
WET PREP SPEC: ABNORMAL

## 2020-09-16 PROCEDURE — G0145 SCR C/V CYTO,THINLAYER,RESCR: HCPCS | Performed by: FAMILY MEDICINE

## 2020-09-16 PROCEDURE — 99396 PREV VISIT EST AGE 40-64: CPT | Mod: 25 | Performed by: FAMILY MEDICINE

## 2020-09-16 PROCEDURE — 81001 URINALYSIS AUTO W/SCOPE: CPT | Performed by: FAMILY MEDICINE

## 2020-09-16 PROCEDURE — 87210 SMEAR WET MOUNT SALINE/INK: CPT | Performed by: FAMILY MEDICINE

## 2020-09-16 PROCEDURE — 90471 IMMUNIZATION ADMIN: CPT | Performed by: FAMILY MEDICINE

## 2020-09-16 PROCEDURE — 99213 OFFICE O/P EST LOW 20 MIN: CPT | Mod: 25 | Performed by: FAMILY MEDICINE

## 2020-09-16 PROCEDURE — 87086 URINE CULTURE/COLONY COUNT: CPT | Performed by: FAMILY MEDICINE

## 2020-09-16 PROCEDURE — 90686 IIV4 VACC NO PRSV 0.5 ML IM: CPT | Performed by: FAMILY MEDICINE

## 2020-09-16 PROCEDURE — 87624 HPV HI-RISK TYP POOLED RSLT: CPT | Performed by: FAMILY MEDICINE

## 2020-09-16 ASSESSMENT — MIFFLIN-ST. JEOR: SCORE: 1009.23

## 2020-09-16 NOTE — PROGRESS NOTES
SUBJECTIVE:   CC: Carlos Eng is an 44 year old woman who presents for preventive health visit.       Patient has been advised of split billing requirements and indicates understanding: Yes  Healthy Habits:     Getting at least 3 servings of Calcium per day:  NO    Bi-annual eye exam:  Yes    Dental care twice a year:  NO    Sleep apnea or symptoms of sleep apnea:  None    Diet:  Regular (no restrictions)    Frequency of exercise:  1 day/week    Duration of exercise:  15-30 minutes    Taking medications regularly:  Yes    Barriers to taking medications:  None    Medication side effects:  None    PHQ-2 Total Score: 0    Additional concerns today:  No    Still having UTI-like symptoms; recently finished abx for a UTI dx on Sept 1st 2020.  Would like to get re-checked for UTI.  Unsure if the irritation is just due to her menstrual period.  Does not have any abnormal vaginal discharge and has no concerns for STDs.    Today's PHQ-2 Score:   PHQ-2 ( 1999 Pfizer) 9/16/2020   Q1: Little interest or pleasure in doing things 0   Q2: Feeling down, depressed or hopeless 0   PHQ-2 Score 0   Q1: Little interest or pleasure in doing things Not at all   Q2: Feeling down, depressed or hopeless Not at all   PHQ-2 Score 0       Abuse: Current or Past (Physical, Sexual or Emotional) - No  Do you feel safe in your environment? Yes        Social History     Tobacco Use     Smoking status: Never Smoker     Smokeless tobacco: Never Used   Substance Use Topics     Alcohol use: Yes     Comment: Occ         Alcohol Use 9/16/2020   Prescreen: >3 drinks/day or >7 drinks/week? No   Prescreen: >3 drinks/day or >7 drinks/week? -       Reviewed orders with patient.  Reviewed health maintenance and updated orders accordingly - Yes  Lab work is in process  Labs reviewed in EPIC      Pertinent mammograms are reviewed under the imaging tab.  History of abnormal Pap smear: NO - age 30-65 PAP every 5 years with negative HPV co-testing  "recommended  PAP / HPV Latest Ref Rng & Units 4/22/2016 6/20/2013   PAP - NIL NIL   HPV 16 DNA NEG Negative -   HPV 18 DNA NEG Negative -   OTHER HR HPV NEG Negative -     Reviewed and updated as needed this visit by clinical staff  Tobacco  Allergies  Meds  Problems  Med Hx  Surg Hx  Fam Hx  Soc Hx          Reviewed and updated as needed this visit by Provider  Tobacco  Allergies  Meds  Problems  Med Hx  Surg Hx  Fam Hx          History reviewed. No pertinent past medical history.   Past Surgical History:   Procedure Laterality Date     BACK SURGERY       OB History   No obstetric history on file.       Review of Systems  CONSTITUTIONAL: NEGATIVE for fever, chills, change in weight  INTEGUMENTARU/SKIN: NEGATIVE for worrisome rashes, moles or lesions  EYES: NEGATIVE for vision changes or irritation  ENT: NEGATIVE for ear, mouth and throat problems  RESP: NEGATIVE for significant cough or SOB  BREAST: NEGATIVE for masses, tenderness or discharge  CV: NEGATIVE for chest pain, palpitations or peripheral edema  GI: NEGATIVE for nausea, abdominal pain, heartburn, or change in bowel habits  MUSCULOSKELETAL: NEGATIVE for significant arthralgias or myalgia  NEURO: NEGATIVE for weakness, dizziness or paresthesias  PSYCHIATRIC: NEGATIVE for changes in mood or affect     OBJECTIVE:   BP 98/64   Pulse 67   Temp 98  F (36.7  C) (Tympanic)   Resp 16   Ht 1.499 m (4' 11\")   Wt 45.4 kg (100 lb)   LMP 09/07/2020 (Exact Date)   SpO2 100%   Breastfeeding No   BMI 20.20 kg/m    Physical Exam  GENERAL: healthy, alert and no distress  EYES: Eyes grossly normal to inspection, PERRL and conjunctivae and sclerae normal  HENT: normal cephalic/atraumatic and wearing face mask  NECK: no adenopathy, no asymmetry, masses, or scars and thyroid normal to palpation  RESP: lungs clear to auscultation - no rales, rhonchi or wheezes  BREAST: declined exam  CV: regular rate and rhythm, normal S1 S2, no S3 or S4, no murmur, click " "or rub, no peripheral edema and peripheral pulses strong  ABDOMEN: soft, nontender, no hepatosplenomegaly, no masses and bowel sounds normal   (female): normal female external genitalia, normal urethral meatus, vaginal mucosa pink, moist, well rugated, and normal cervix/adnexa/uterus without masses or discharge  MS: no gross musculoskeletal defects noted, no edema  SKIN: no suspicious lesions or rashes  NEURO: Normal strength and tone, mentation intact and speech normal  PSYCH: mentation appears normal, affect normal/bright    Diagnostic Test Results:  Labs reviewed in Epic    ASSESSMENT/PLAN:   Carlos was seen today for physical and imm/inj.    Diagnoses and all orders for this visit:    Encounter for well adult exam without abnormal findings    Dysuria  -     UA with Microscopic reflex to Culture  -     Urine Culture Aerobic Bacterial  -     Wet prep    Cervical cancer screening  -     Pap imaged thin layer screen with HPV - recommended age 30 - 65 years (select HPV order below)  -     HPV High Risk Types DNA Cervical    Hypercholesterolemia  -     Lipid Profile; Future    Screening for diabetes mellitus  -     Glucose; Future    Need for prophylactic vaccination and inoculation against influenza  -     INFLUENZA VACCINE IM > 6 MONTHS VALENT IIV4 [71589]    Other orders  -     Cancel: TDAP VACCINE (ADACEL) [41409.002]  -     1st  Administration  [37862]      U/A appears negative for UTI; shows only blood present due to her menstrual period.  Ucx pending  Wet prep shows only a \"few\" clue cells; sent [employer] message to pt about result.  Pt will let me know if she would like to get treated.  For now, recommended to keep an eye on her symptoms and to increase her hydration.  Will RTC to get lipids and glucose checked when she is fasting.    Patient has been advised of split billing requirements and indicates understanding: Yes  COUNSELING:  Reviewed preventive health counseling, as reflected in patient " "instructions  Special attention given to:        Regular exercise       Healthy diet/nutrition       Vision screening       Hearing screening       Immunizations    Vaccinated for: Influenza         Osteoporosis Prevention/Bone Health       The 10-year ASCVD risk score (Christ JOHNSON JrMoses, et al., 2013) is: 0.5%    Values used to calculate the score:      Age: 44 years      Sex: Female      Is Non- : No      Diabetic: No      Tobacco smoker: No      Systolic Blood Pressure: 98 mmHg      Is BP treated: No      HDL Cholesterol: 53 mg/dL      Total Cholesterol: 195 mg/dL    Estimated body mass index is 20.2 kg/m  as calculated from the following:    Height as of this encounter: 1.499 m (4' 11\").    Weight as of this encounter: 45.4 kg (100 lb).    Weight management plan noted, stable and monitoring    She reports that she has never smoked. She has never used smokeless tobacco.      Counseling Resources:  ATP IV Guidelines  Pooled Cohorts Equation Calculator  Breast Cancer Risk Calculator  BRCA-Related Cancer Risk Assessment: FHS-7 Tool  FRAX Risk Assessment  ICSI Preventive Guidelines  Dietary Guidelines for Americans, 2010  USDA's MyPlate  ASA Prophylaxis  Lung CA Screening    Renetta Winter, DO  [facility]  "

## 2020-09-16 NOTE — LETTER
September 28, 2020      Carlos SZYMANSKI Velasquez  [address]  [place] [address]        Dear ,    We are happy to inform you that your recent Pap smear and Human Papillomavirus (HPV) test results are normal and negative.    It is recommended that you have your next Pap smear and Human Papillomavirus (HPV) test in 5 years. You will also need to return to the clinic every year for an annual wellness visit.    If you have additional questions regarding this result, please contact our office and we will be happy to assist you.      Sincerely,    Your [facility] Care Team  
patient was found wondering in the street, patient is A+Ox1, no sign of obvious head injury

## 2020-09-17 ENCOUNTER — MYC MEDICAL ADVICE (OUTPATIENT)
Dept: FAMILY MEDICINE | Facility: CLINIC | Age: 45
End: 2020-09-17

## 2020-09-17 DIAGNOSIS — N76.0 BACTERIAL VAGINITIS: Primary | ICD-10-CM

## 2020-09-17 DIAGNOSIS — B96.89 BACTERIAL VAGINITIS: Primary | ICD-10-CM

## 2020-09-17 RX ORDER — METRONIDAZOLE 500 MG/1
500 TABLET ORAL 2 TIMES DAILY
Qty: 14 TABLET | Refills: 0 | Status: SHIPPED | OUTPATIENT
Start: 2020-09-17 | End: 2020-09-24

## 2020-09-18 LAB
BACTERIA SPEC CULT: NORMAL
SPECIMEN SOURCE: NORMAL

## 2020-09-23 LAB
COPATH REPORT: NORMAL
PAP: NORMAL

## 2020-09-25 LAB
FINAL DIAGNOSIS: NORMAL
HPV HR 12 DNA CVX QL NAA+PROBE: NEGATIVE
HPV16 DNA SPEC QL NAA+PROBE: NEGATIVE
HPV18 DNA SPEC QL NAA+PROBE: NEGATIVE
SPECIMEN DESCRIPTION: NORMAL
SPECIMEN SOURCE CVX/VAG CYTO: NORMAL

## 2020-11-04 ENCOUNTER — MYC MEDICAL ADVICE (OUTPATIENT)
Dept: FAMILY MEDICINE | Facility: CLINIC | Age: 45
End: 2020-11-04

## 2020-11-04 NOTE — TELEPHONE ENCOUNTER
Patient name and MRN: Carlos Eng, [id number]      COVID test date and result: Nov 3rd, 2020- Positive      Confirm PCR test (NP or OP): Saliva       Where the test was performed: [facility], [address] [facility] in [place].      This information forwarded to Leta Boo, with IP.

## 2020-12-11 ENCOUNTER — MYC MEDICAL ADVICE (OUTPATIENT)
Dept: FAMILY MEDICINE | Facility: CLINIC | Age: 45
End: 2020-12-11

## 2020-12-11 DIAGNOSIS — Z80.0 FAMILY HISTORY OF COLON CANCER: ICD-10-CM

## 2020-12-11 DIAGNOSIS — Z12.11 COLON CANCER SCREENING: Primary | ICD-10-CM

## 2021-01-01 NOTE — TELEPHONE ENCOUNTER
"Requested Prescriptions   Pending Prescriptions Disp Refills     BLISOVI FE 1/20 1-20 MG-MCG tablet [Pharmacy Med Name: Blisovi FE 1/20 Oral Tablet 1-20 MG-MCG]  Last Written Prescription Date:  1/14/2019  Last Fill Quantity: 84 tablet,  # refills: 2   Last office visit: 9/25/2019 with prescribing provider:  Moy   Future Office Visit:   Next 5 appointments (look out 90 days)    Oct 08, 2019  4:00 PM CDT  [facility]YS[facility] [address] [place] [id number]  [phone]          84 tablet 1     Sig: TAKE ONE TABLET BY MOUTH ONE TIME DAILY       Contraceptives Protocol Passed - 10/7/2019  3:52 PM        Passed - Patient is not a current smoker if age is 35 or older        Passed - Recent (12 mo) or future (30 days) visit within the authorizing provider's specialty     Patient has had an office visit with the authorizing provider or a provider within the authorizing providers department within the previous 12 mos or has a future within next 30 days. See \"Patient Info\" tab in inbasket, or \"Choose Columns\" in Meds & Orders section of the refill encounter.              Passed - Medication is active on med list        Passed - No active pregnancy on record        Passed - No positive pregnancy test in past 12 months           " 12.8

## 2021-01-23 ENCOUNTER — HEALTH MAINTENANCE LETTER (OUTPATIENT)
Age: 46
End: 2021-01-23

## 2021-01-24 DIAGNOSIS — Z11.59 ENCOUNTER FOR SCREENING FOR OTHER VIRAL DISEASES: ICD-10-CM

## 2021-02-09 DIAGNOSIS — Z11.59 ENCOUNTER FOR SCREENING FOR OTHER VIRAL DISEASES: ICD-10-CM

## 2021-02-09 LAB
SARS-COV-2 RNA RESP QL NAA+PROBE: NORMAL
SPECIMEN SOURCE: NORMAL

## 2021-02-09 PROCEDURE — U0005 INFEC AGEN DETEC AMPLI PROBE: HCPCS | Performed by: INTERNAL MEDICINE

## 2021-02-09 PROCEDURE — U0003 INFECTIOUS AGENT DETECTION BY NUCLEIC ACID (DNA OR RNA); SEVERE ACUTE RESPIRATORY SYNDROME CORONAVIRUS 2 (SARS-COV-2) (CORONAVIRUS DISEASE [COVID-19]), AMPLIFIED PROBE TECHNIQUE, MAKING USE OF HIGH THROUGHPUT TECHNOLOGIES AS DESCRIBED BY CMS-2020-01-R: HCPCS | Performed by: INTERNAL MEDICINE

## 2021-02-10 LAB
LABORATORY COMMENT REPORT: NORMAL
SARS-COV-2 RNA RESP QL NAA+PROBE: NEGATIVE
SPECIMEN SOURCE: NORMAL

## 2021-02-12 ENCOUNTER — HOSPITAL ENCOUNTER (OUTPATIENT)
Facility: CLINIC | Age: 46
Discharge: HOME | End: 2021-02-12
Attending: INTERNAL MEDICINE | Admitting: INTERNAL MEDICINE
Payer: COMMERCIAL

## 2021-02-12 VITALS
HEART RATE: 88 BPM | OXYGEN SATURATION: 100 % | TEMPERATURE: 98.2 F | SYSTOLIC BLOOD PRESSURE: 116 MMHG | BODY MASS INDEX: 20.16 KG/M2 | WEIGHT: 100 LBS | DIASTOLIC BLOOD PRESSURE: 106 MMHG | RESPIRATION RATE: 17 BRPM | HEIGHT: 59 IN

## 2021-02-12 LAB — COLONOSCOPY: NORMAL

## 2021-02-12 PROCEDURE — G0500 MOD SEDAT ENDO SERVICE >5YRS: HCPCS | Performed by: INTERNAL MEDICINE

## 2021-02-12 PROCEDURE — 45380 COLONOSCOPY AND BIOPSY: CPT | Mod: PT | Performed by: INTERNAL MEDICINE

## 2021-02-12 PROCEDURE — 250N000011 HC RX IP 250 OP 636: Performed by: INTERNAL MEDICINE

## 2021-02-12 PROCEDURE — 88305 TISSUE EXAM BY PATHOLOGIST: CPT | Mod: 26 | Performed by: PATHOLOGY

## 2021-02-12 PROCEDURE — 88305 TISSUE EXAM BY PATHOLOGIST: CPT | Mod: TC | Performed by: INTERNAL MEDICINE

## 2021-02-12 RX ORDER — FENTANYL CITRATE 50 UG/ML
INJECTION, SOLUTION INTRAMUSCULAR; INTRAVENOUS PRN
Status: DISCONTINUED | OUTPATIENT
Start: 2021-02-12 | End: 2021-02-12 | Stop reason: HOSPADM

## 2021-02-12 ASSESSMENT — MIFFLIN-ST. JEOR: SCORE: 1004.48

## 2021-02-15 LAB — COPATH REPORT: NORMAL

## 2021-02-17 DIAGNOSIS — N92.0 EXCESSIVE OR FREQUENT MENSTRUATION: ICD-10-CM

## 2021-02-18 RX ORDER — NORETHINDRONE ACETATE AND ETHINYL ESTRADIOL 1MG-20(21)
1 KIT ORAL DAILY
Qty: 84 TABLET | Refills: 1 | Status: SHIPPED | OUTPATIENT
Start: 2021-02-18 | End: 2021-08-12

## 2021-02-26 DIAGNOSIS — Z12.31 VISIT FOR SCREENING MAMMOGRAM: ICD-10-CM

## 2021-02-26 PROCEDURE — 77063 BREAST TOMOSYNTHESIS BI: CPT | Mod: TC | Performed by: RADIOLOGY

## 2021-02-26 PROCEDURE — 77067 SCR MAMMO BI INCL CAD: CPT | Mod: TC | Performed by: RADIOLOGY

## 2021-07-07 ENCOUNTER — OFFICE VISIT (OUTPATIENT)
Dept: INTERNAL MEDICINE | Facility: CLINIC | Age: 46
End: 2021-07-07
Payer: COMMERCIAL

## 2021-07-07 VITALS
SYSTOLIC BLOOD PRESSURE: 102 MMHG | HEIGHT: 59 IN | DIASTOLIC BLOOD PRESSURE: 62 MMHG | WEIGHT: 94.2 LBS | HEART RATE: 67 BPM | TEMPERATURE: 98.9 F | BODY MASS INDEX: 18.99 KG/M2 | OXYGEN SATURATION: 100 %

## 2021-07-07 DIAGNOSIS — R20.0 NUMBNESS AND TINGLING OF FOOT: Primary | ICD-10-CM

## 2021-07-07 DIAGNOSIS — R20.2 NUMBNESS AND TINGLING OF FOOT: Primary | ICD-10-CM

## 2021-07-07 PROCEDURE — 99213 OFFICE O/P EST LOW 20 MIN: CPT | Performed by: FAMILY MEDICINE

## 2021-07-07 ASSESSMENT — MIFFLIN-ST. JEOR: SCORE: 977.92

## 2021-07-07 NOTE — PROGRESS NOTES
Assessment & Plan     Numbness and tingling of foot    - NEUROLOGY ADULT REFERRAL             There are no Patient Instructions on file for this visit.    No follow-ups on file.    Bhargav Lawrence MD  [facility]    Mckenna Gonzalez is a 45 year old who presents for the following health issues     HPI     Left foot tingling x3 days  Started when sitting on it but this time didn't resolve  She points to the entire left forefoot where her numbness is.  She has had this in the past when she sits on her feet but this time it did not go away when she got off.    Review of Systems   Constitutional, HEENT, cardiovascular, pulmonary, gi and gu systems are negative, except as otherwise noted.      Objective    There were no vitals taken for this visit.  There is no height or weight on file to calculate BMI.  Physical Exam   GENERAL APPEARANCE: healthy, alert and no distress  CV: peripheral pulses strong  MS: extremities normal- no gross deformities noted  NEURO: Normal strength and tone, sensory exam grossly normal, mentation intact and speech normal

## 2021-07-07 NOTE — PATIENT INSTRUCTIONS
We will check an EMG of the left foot.  Further plan will be pending review of that test or if symptoms persist.

## 2021-07-28 DIAGNOSIS — N92.0 EXCESSIVE OR FREQUENT MENSTRUATION: ICD-10-CM

## 2021-07-29 RX ORDER — NORETHINDRONE ACETATE AND ETHINYL ESTRADIOL 1MG-20(21)
1 KIT ORAL DAILY
Qty: 84 TABLET | Refills: 0 | Status: CANCELLED | OUTPATIENT
Start: 2021-07-29

## 2021-07-29 NOTE — TELEPHONE ENCOUNTER
Called and spoke with patient, refused medication as patient's former provider [facility] is no longer in system. No provider to attach faxed request to for medication. Pharmacy will inform patient.     Ramu HOLLY RN

## 2021-10-24 ENCOUNTER — HEALTH MAINTENANCE LETTER (OUTPATIENT)
Age: 46
End: 2021-10-24

## 2021-11-11 ENCOUNTER — OFFICE VISIT (OUTPATIENT)
Dept: FAMILY MEDICINE | Facility: CLINIC | Age: 46
End: 2021-11-11
Payer: COMMERCIAL

## 2021-11-11 VITALS
OXYGEN SATURATION: 99 % | HEIGHT: 59 IN | DIASTOLIC BLOOD PRESSURE: 74 MMHG | RESPIRATION RATE: 16 BRPM | BODY MASS INDEX: 18.47 KG/M2 | SYSTOLIC BLOOD PRESSURE: 110 MMHG | WEIGHT: 91.6 LBS | TEMPERATURE: 97.8 F | HEART RATE: 99 BPM

## 2021-11-11 DIAGNOSIS — Z00.00 ROUTINE GENERAL MEDICAL EXAMINATION AT A HEALTH CARE FACILITY: Primary | ICD-10-CM

## 2021-11-11 DIAGNOSIS — Z11.4 SCREENING FOR HIV (HUMAN IMMUNODEFICIENCY VIRUS): ICD-10-CM

## 2021-11-11 DIAGNOSIS — N92.0 EXCESSIVE OR FREQUENT MENSTRUATION: ICD-10-CM

## 2021-11-11 DIAGNOSIS — Z11.59 NEED FOR HEPATITIS C SCREENING TEST: ICD-10-CM

## 2021-11-11 DIAGNOSIS — Z13.21 ENCOUNTER FOR VITAMIN DEFICIENCY SCREENING: ICD-10-CM

## 2021-11-11 DIAGNOSIS — Z23 NEED FOR PROPHYLACTIC VACCINATION AND INOCULATION AGAINST INFLUENZA: ICD-10-CM

## 2021-11-11 DIAGNOSIS — E78.5 DYSLIPIDEMIA: ICD-10-CM

## 2021-11-11 DIAGNOSIS — R20.0 NUMBNESS OF LEFT FOOT: ICD-10-CM

## 2021-11-11 PROCEDURE — 90471 IMMUNIZATION ADMIN: CPT | Performed by: INTERNAL MEDICINE

## 2021-11-11 PROCEDURE — 90686 IIV4 VACC NO PRSV 0.5 ML IM: CPT | Performed by: INTERNAL MEDICINE

## 2021-11-11 PROCEDURE — 99396 PREV VISIT EST AGE 40-64: CPT | Mod: 25 | Performed by: INTERNAL MEDICINE

## 2021-11-11 RX ORDER — NORETHINDRONE ACETATE AND ETHINYL ESTRADIOL 1MG-20(21)
1 KIT ORAL DAILY
Qty: 84 TABLET | Refills: 0 | Status: SHIPPED | OUTPATIENT
Start: 2021-11-11 | End: 2022-02-08

## 2021-11-11 ASSESSMENT — ENCOUNTER SYMPTOMS
HEARTBURN: 0
PARESTHESIAS: 0
DYSURIA: 0
CONSTIPATION: 0
JOINT SWELLING: 0
WEAKNESS: 0
SHORTNESS OF BREATH: 0
ARTHRALGIAS: 0
BREAST MASS: 0
HEADACHES: 0
CHILLS: 1
MYALGIAS: 0
NAUSEA: 0
HEMATOCHEZIA: 0
ABDOMINAL PAIN: 0
SORE THROAT: 0
DIARRHEA: 0
EYE PAIN: 0
FEVER: 0
COUGH: 0
FREQUENCY: 1
PALPITATIONS: 0
DIZZINESS: 0
NERVOUS/ANXIOUS: 0
HEMATURIA: 0

## 2021-11-11 ASSESSMENT — MIFFLIN-ST. JEOR: SCORE: 966.12

## 2021-11-11 ASSESSMENT — PAIN SCALES - GENERAL: PAINLEVEL: NO PAIN (0)

## 2021-11-11 NOTE — PROGRESS NOTES
SUBJECTIVE:   CC: Carlos Eng is an 45 year old woman who presents for preventive health visit.       Patient has been advised of split billing requirements and indicates understanding: Yes  Healthy Habits:     Getting at least 3 servings of Calcium per day:  NO    Bi-annual eye exam:  Yes    Dental care twice a year:  NO    Sleep apnea or symptoms of sleep apnea:  Daytime drowsiness    Diet:  Regular (no restrictions)    Frequency of exercise:  1 day/week    Duration of exercise:  15-30 minutes    Taking medications regularly:  Yes    Medication side effects:  None    PHQ-2 Total Score: 0    Additional concerns today:  No      Today's PHQ-2 Score:   PHQ-2 ( 1999 Pfizer) 11/11/2021   Q1: Little interest or pleasure in doing things 0   Q2: Feeling down, depressed or hopeless 0   PHQ-2 Score 0   Q1: Little interest or pleasure in doing things Not at all   Q2: Feeling down, depressed or hopeless Not at all   PHQ-2 Score 0       Abuse: Current or Past (Physical, Sexual or Emotional) - No  Do you feel safe in your environment? Yes    Have you ever done Advance Care Planning? (For example, a Health Directive, POLST, or a discussion with a medical provider or your loved ones about your wishes): No, advance care planning information given to patient to review.  Patient declined advance care planning discussion at this time.    Social History     Tobacco Use     Smoking status: Never Smoker     Smokeless tobacco: Never Used   Substance Use Topics     Alcohol use: Yes     Comment: Occ     If you drink alcohol do you typically have >3 drinks per day or >7 drinks per week? Yes    Alcohol Use 11/11/2021   Prescreen: >3 drinks/day or >7 drinks/week? No   Prescreen: >3 drinks/day or >7 drinks/week? -   No flowsheet data found.    Reviewed orders with patient.  Reviewed health maintenance and updated orders accordingly - Yes  Lab work is in process  Labs reviewed in EPIC    Breast Cancer Screening:    FHS-7:   Breast CA  Risk Assessment (FHS-7) 2021   Did any of your first-degree relatives have breast or ovarian cancer? No   Did any of your relatives have bilateral breast cancer? No   Did any man in your family have breast cancer? No   Did any woman in your family have breast and ovarian cancer? No   Did any woman in your family have breast cancer before age 50 y? No   Do you have 2 or more relatives with breast and/or ovarian cancer? No   Do you have 2 or more relatives with breast and/or bowel cancer? Yes     click delete button to remove this line now  Mammogram Screening: Recommended annual mammography  Pertinent mammograms are reviewed under the imaging tab.    History of abnormal Pap smear: NO - age 30-65 PAP every 5 years with negative HPV co-testing recommended  PAP / HPV Latest Ref Rng & Units 2020   PAP (Historical) - NIL NIL NIL   HPV16 NEG:Negative Negative Negative -   HPV18 NEG:Negative Negative Negative -   HRHPV NEG:Negative Negative Negative -     Reviewed and updated as needed this visit by clinical staff  Tobacco  Allergies  Meds             Reviewed and updated as needed this visit by Provider               No past medical history on file.   Past Surgical History:   Procedure Laterality Date     BACK SURGERY      mayte placement for scoliosis     COLONOSCOPY N/A 2021    Procedure: COLONOSCOPY, WITH POLYPECTOMY AND BIOPSY;  Surgeon: Delma Keane MD;  Location:  GI     GYN SURGERY            OB History   No obstetric history on file.       Review of Systems   Constitutional: Positive for chills. Negative for fever.   HENT: Negative for congestion, ear pain, hearing loss and sore throat.    Eyes: Negative for pain and visual disturbance.   Respiratory: Negative for cough and shortness of breath.    Cardiovascular: Negative for chest pain, palpitations and peripheral edema.   Gastrointestinal: Negative for abdominal pain, constipation, diarrhea, heartburn,  "hematochezia and nausea.   Breasts:  Negative for tenderness, breast mass and discharge.   Genitourinary: Positive for frequency and pelvic pain. Negative for dysuria, genital sores, hematuria, urgency, vaginal bleeding and vaginal discharge.   Musculoskeletal: Negative for arthralgias, joint swelling and myalgias.   Skin: Negative for rash.   Neurological: Negative for dizziness, weakness, headaches and paresthesias.   Psychiatric/Behavioral: Negative for mood changes. The patient is not nervous/anxious.      CONSTITUTIONAL: NEGATIVE for fever, chills, change in weight  INTEGUMENTARU/SKIN: NEGATIVE for worrisome rashes, moles or lesions  EYES: NEGATIVE for vision changes or irritation  ENT: NEGATIVE for ear, mouth and throat problems  RESP: NEGATIVE for significant cough or SOB  BREAST: NEGATIVE for masses, tenderness or discharge  CV: NEGATIVE for chest pain, palpitations or peripheral edema  GI: NEGATIVE for nausea, abdominal pain, heartburn, or change in bowel habits  : NEGATIVE for unusual urinary or vaginal symptoms. Periods are regular.  MUSCULOSKELETAL: NEGATIVE for significant arthralgias or myalgia  NEURO: NEGATIVE for weakness, dizziness or paresthesias  PSYCHIATRIC: NEGATIVE for changes in mood or affect     OBJECTIVE:   /74   Pulse 99   Temp 97.8  F (36.6  C) (Tympanic)   Resp 16   Ht 1.499 m (4' 11\")   Wt 41.5 kg (91 lb 9.6 oz)   LMP 09/14/2021   SpO2 99%   BMI 18.50 kg/m    Physical Exam  GENERAL: healthy, alert and no distress  EYES: Eyes grossly normal to inspection, PERRL and conjunctivae and sclerae normal  HENT: ear canals and TM's normal, nose and mouth without ulcers or lesions  NECK: no adenopathy, no asymmetry, masses, or scars and thyroid normal to palpation  RESP: lungs clear to auscultation - no rales, rhonchi or wheezes  BREAST: Defered, as pt takes Mammgrams  CV: regular rate and rhythm, normal S1 S2, no S3 or S4, no murmur, click or rub, no peripheral edema and " peripheral pulses strong  ABDOMEN: soft, nontender, no hepatosplenomegaly, no masses and bowel sounds normal  MS: no gross musculoskeletal defects noted, no edema  SKIN: no suspicious lesions or rashes  NEURO: Normal strength and tone, mentation intact and speech normal  PSYCH: mentation appears normal, affect normal/bright    Diagnostic Test Results:  Labs reviewed in Epic    ASSESSMENT/PLAN:       Assessment and Plan  1. Routine general medical examination at a health care facility  Pt is new to me, PCP  in the past. She is for physical and flu shot.   - REVIEW OF HEALTH MAINTENANCE PROTOCOL ORDERS  - HIV Antigen Antibody Combo; Future  - Hepatitis C Screen Reflex to HCV RNA Quant and Genotype; Future  - Comprehensive metabolic panel (BMP + Alb, Alk Phos, ALT, AST, Total. Bili, TP); Future  - CBC with platelets; Future  - Lipid panel reflex to direct LDL Fasting; Future  - norethindrone-ethinyl estradiol (BLISOVI FE 1/20) 1-20 MG-MCG tablet; Take 1 tablet by mouth daily  Dispense: 84 tablet; Refill: 0  - INFLUENZA VACCINE IM > 6 MONTHS VALENT IIV4 (AFLURIA/FLUZONE)  - Vitamin B12; Future  - Vitamin D Deficiency; Future    2. Numbness of left foot  - Vitamin B12; Future    3. Dyslipidemia  - Lipid panel reflex to direct LDL Fasting; Future    4. Excessive or frequent menstruation since 10-16  - norethindrone-ethinyl estradiol (BLISOVI FE 1/20) 1-20 MG-MCG tablet; Take 1 tablet by mouth daily  Dispense: 84 tablet; Refill: 0    5. Screening for HIV (human immunodeficiency virus)  - HIV Antigen Antibody Combo; Future    6. Need for hepatitis C screening test  - Hepatitis C Screen Reflex to HCV RNA Quant and Genotype; Future    7. Need for prophylactic vaccination and inoculation against influenza  - INFLUENZA VACCINE IM > 6 MONTHS VALENT IIV4 (AFLURIA/FLUZONE)    8. Encounter for vitamin deficiency screening  - Vitamin D Deficiency; Future       Patient Instructions   Please do LAB only appointment in fasting  as the orders placed.     ======================      Preventive Health Recommendations  Female Ages 40 to 49    Yearly exam:     See your health care provider every year in order to  1. Review health changes.   2. Discuss preventive care.    3. Review your medicines if your doctor prescribed any.      Get a Pap test every three years (unless you have an abnormal result and your provider advises testing more often).      If you get Pap tests with HPV test, you only need to test every 5 years, unless you have an abnormal result. You do not need a Pap test if your uterus was removed (hysterectomy) and you have not had cancer.      You should be tested each year for STDs (sexually transmitted diseases), if you're at risk.     Ask your doctor if you should have a mammogram.      Have a colonoscopy (test for colon cancer) if someone in your family has had colon cancer or polyps before age 50.       Have a cholesterol test every 5 years.       Have a diabetes test (fasting glucose) after age 45. If you are at risk for diabetes, you should have this test every 3 years.    Shots: Get a flu shot each year. Get a tetanus shot every 10 years.     Nutrition:     Eat at least 5 servings of fruits and vegetables each day.    Eat whole-grain bread, whole-wheat pasta and brown rice instead of white grains and rice.    Get adequate Calcium and Vitamin D.      Lifestyle    Exercise at least 150 minutes a week (an average of 30 minutes a day, 5 days a week). This will help you control your weight and prevent disease.    Limit alcohol to one drink per day.    No smoking.     Wear sunscreen to prevent skin cancer.    See your dentist every six months for an exam and cleaning.    Return in about 1 year (around 11/11/2022), or if symptoms worsen or fail to improve, for Preventative Visit.    Alexandra Mayorga MD  [facility] [place]      Patient has been advised of split billing requirements and indicates understanding:  "Yes  COUNSELING:  Reviewed preventive health counseling, as reflected in patient instructions  Special attention given to:        Regular exercise       Healthy diet/nutrition       Vision screening       Hearing screening       Immunizations    Vaccinated for: Influenza             Osteoporosis prevention/bone health    Estimated body mass index is 18.5 kg/m  as calculated from the following:    Height as of this encounter: 1.499 m (4' 11\").    Weight as of this encounter: 41.5 kg (91 lb 9.6 oz).        She reports that she has never smoked. She has never used smokeless tobacco.      Counseling Resources:  ATP IV Guidelines  Pooled Cohorts Equation Calculator  Breast Cancer Risk Calculator  BRCA-Related Cancer Risk Assessment: FHS-7 Tool  FRAX Risk Assessment  ICSI Preventive Guidelines  Dietary Guidelines for Americans, 2010  USDA's MyPlate  ASA Prophylaxis  Lung CA Screening    Alexandra Mayorga MD  [facility]  "

## 2021-11-11 NOTE — PROGRESS NOTES
SUBJECTIVE:   CC: Carlos Eng is an 45 year old woman who presents for preventive health visit.     {Split Bill scripting  The purpose of this visit is to discuss your medical history and prevent health problems before you are sick. You may be responsible for a co-pay, coinsurance, or deductible if your visit today includes services such as checking on a sore throat, having an x-ray or lab test, or treating and evaluating a new or existing condition :073313}  Patient has been advised of split billing requirements and indicates understanding: {Yes and No:342963}  HPI  {Add if <65 person on Medicare  - Required Questions (Optional):632022}  {Outside tests to abstract? :833217}    {additional problems to add (Optional):222682}    Today's PHQ-2 Score:   PHQ-2 ( 1999 Pfizer) 11/11/2021   Q1: Little interest or pleasure in doing things 0   Q2: Feeling down, depressed or hopeless 0   PHQ-2 Score 0   Q1: Little interest or pleasure in doing things Not at all   Q2: Feeling down, depressed or hopeless Not at all   PHQ-2 Score 0       Abuse: Current or Past (Physical, Sexual or Emotional) - { :595585}  Do you feel safe in your environment? { :863036}        Social History     Tobacco Use     Smoking status: Never Smoker     Smokeless tobacco: Never Used   Substance Use Topics     Alcohol use: Yes     Comment: Occ     {Rooming Staff- Complete this question if Prescreen response is not shown below for today's visit. If you drink alcohol do you typically have >3 drinks per day or >7 drinks per week? (Optional):324047}    Alcohol Use 11/11/2021   Prescreen: >3 drinks/day or >7 drinks/week? No   Prescreen: >3 drinks/day or >7 drinks/week? -   {add AUDIT responses (Optional) (A score of 7 for adult men is an indication of hazardous drinking; a score of 8 or more is an indication of an alcohol use disorder.  A score of 7 or more for adult women is an indication of hazardous drinking or an alchohol use  "disorder):092111}    Reviewed orders with patient.  Reviewed health maintenance and updated orders accordingly - { :231057::\"Yes\"}  {Chronicprobdata (optional):506300}    Breast Cancer Screening:    FHS-7:   Breast CA Risk Assessment (FHS-7) 11/11/2021   Did any of your first-degree relatives have breast or ovarian cancer? No   Did any of your relatives have bilateral breast cancer? No   Did any man in your family have breast cancer? No   Did any woman in your family have breast and ovarian cancer? No   Did any woman in your family have breast cancer before age 50 y? No   Do you have 2 or more relatives with breast and/or ovarian cancer? No   Do you have 2 or more relatives with breast and/or bowel cancer? Yes     {If any of the questions to the BCRA (FHS-7) are answered yes, consider ordering referral for genetic counseling (Optional) :434644::\"click delete button to remove this line now\"}  {AMB Mammogram Decision Support (Optional) :308694}  Pertinent mammograms are reviewed under the imaging tab.    History of abnormal Pap smear: { :806275}  PAP / HPV Latest Ref Rng & Units 9/16/2020 4/22/2016 6/20/2013   PAP (Historical) - NIL NIL NIL   HPV16 NEG:Negative Negative Negative -   HPV18 NEG:Negative Negative Negative -   HRHPV NEG:Negative Negative Negative -     Reviewed and updated as needed this visit by clinical staff  Tobacco  Allergies  Meds             Reviewed and updated as needed this visit by Provider               {HISTORY OPTIONS (Optional):025874}    Review of Systems  {FEMALE ROS (Optional):729921}     OBJECTIVE:   /74   Pulse 99   Temp 97.8  F (36.6  C) (Tympanic)   Resp 16   Ht 1.499 m (4' 11\")   Wt 41.5 kg (91 lb 9.6 oz)   LMP 09/14/2021   SpO2 99%   BMI 18.50 kg/m    Physical Exam  {Exam Choices (Optional):939641}    {Diagnostic Test Results (Optional):739262::\"Diagnostic Test Results:\",\"Labs reviewed in Epic\"}    ASSESSMENT/PLAN:   {Diag Picklist:098377}    Patient has been " "advised of split billing requirements and indicates understanding: {YES / NO:545018::\"Yes\"}  COUNSELING:  {FEMALE COUNSELING MESSAGES:034765::\"Reviewed preventive health counseling, as reflected in patient instructions\"}    Estimated body mass index is 18.5 kg/m  as calculated from the following:    Height as of this encounter: 1.499 m (4' 11\").    Weight as of this encounter: 41.5 kg (91 lb 9.6 oz).    {Weight Management Plan (ACO) Complete if BMI is abnormal-  Ages 18-64  BMI >24.9.  Age 65+ with BMI <23 or >30 (Optional):573256}    She reports that she has never smoked. She has never used smokeless tobacco.      Counseling Resources:  ATP IV Guidelines  Pooled Cohorts Equation Calculator  Breast Cancer Risk Calculator  BRCA-Related Cancer Risk Assessment: FHS-7 Tool  FRAX Risk Assessment  ICSI Preventive Guidelines  Dietary Guidelines for Americans, 2010  USDA's MyPlate  ASA Prophylaxis  Lung CA Screening    Alexandra Mayorga MD  [facility] [place]  "

## 2021-11-11 NOTE — PATIENT INSTRUCTIONS
Please do LAB only appointment in fasting as the orders placed.     ======================      Preventive Health Recommendations  Female Ages 40 to 49    Yearly exam:     See your health care provider every year in order to  1. Review health changes.   2. Discuss preventive care.    3. Review your medicines if your doctor prescribed any.      Get a Pap test every three years (unless you have an abnormal result and your provider advises testing more often).      If you get Pap tests with HPV test, you only need to test every 5 years, unless you have an abnormal result. You do not need a Pap test if your uterus was removed (hysterectomy) and you have not had cancer.      You should be tested each year for STDs (sexually transmitted diseases), if you're at risk.     Ask your doctor if you should have a mammogram.      Have a colonoscopy (test for colon cancer) if someone in your family has had colon cancer or polyps before age 50.       Have a cholesterol test every 5 years.       Have a diabetes test (fasting glucose) after age 45. If you are at risk for diabetes, you should have this test every 3 years.    Shots: Get a flu shot each year. Get a tetanus shot every 10 years.     Nutrition:     Eat at least 5 servings of fruits and vegetables each day.    Eat whole-grain bread, whole-wheat pasta and brown rice instead of white grains and rice.    Get adequate Calcium and Vitamin D.      Lifestyle    Exercise at least 150 minutes a week (an average of 30 minutes a day, 5 days a week). This will help you control your weight and prevent disease.    Limit alcohol to one drink per day.    No smoking.     Wear sunscreen to prevent skin cancer.    See your dentist every six months for an exam and cleaning.

## 2021-11-17 ENCOUNTER — LAB (OUTPATIENT)
Dept: LAB | Facility: CLINIC | Age: 46
End: 2021-11-17
Payer: COMMERCIAL

## 2021-11-17 DIAGNOSIS — R20.0 NUMBNESS OF LEFT FOOT: ICD-10-CM

## 2021-11-17 DIAGNOSIS — E78.5 DYSLIPIDEMIA: ICD-10-CM

## 2021-11-17 DIAGNOSIS — Z13.21 ENCOUNTER FOR VITAMIN DEFICIENCY SCREENING: ICD-10-CM

## 2021-11-17 DIAGNOSIS — Z11.4 SCREENING FOR HIV (HUMAN IMMUNODEFICIENCY VIRUS): ICD-10-CM

## 2021-11-17 DIAGNOSIS — Z11.59 NEED FOR HEPATITIS C SCREENING TEST: ICD-10-CM

## 2021-11-17 DIAGNOSIS — Z00.00 ROUTINE GENERAL MEDICAL EXAMINATION AT A HEALTH CARE FACILITY: ICD-10-CM

## 2021-11-17 LAB
ERYTHROCYTE [DISTWIDTH] IN BLOOD BY AUTOMATED COUNT: 12.6 % (ref 10–15)
HCT VFR BLD AUTO: 42.5 % (ref 35–47)
HGB BLD-MCNC: 13.8 G/DL (ref 11.7–15.7)
MCH RBC QN AUTO: 30.9 PG (ref 26.5–33)
MCHC RBC AUTO-ENTMCNC: 32.5 G/DL (ref 31.5–36.5)
MCV RBC AUTO: 95 FL (ref 78–100)
PLATELET # BLD AUTO: 327 10E3/UL (ref 150–450)
RBC # BLD AUTO: 4.47 10E6/UL (ref 3.8–5.2)
VIT B12 SERPL-MCNC: 412 PG/ML (ref 193–986)
WBC # BLD AUTO: 5.9 10E3/UL (ref 4–11)

## 2021-11-17 PROCEDURE — 85027 COMPLETE CBC AUTOMATED: CPT

## 2021-11-17 PROCEDURE — 80053 COMPREHEN METABOLIC PANEL: CPT

## 2021-11-17 PROCEDURE — 36415 COLL VENOUS BLD VENIPUNCTURE: CPT

## 2021-11-17 PROCEDURE — 86803 HEPATITIS C AB TEST: CPT

## 2021-11-17 PROCEDURE — 87389 HIV-1 AG W/HIV-1&-2 AB AG IA: CPT

## 2021-11-17 PROCEDURE — 82306 VITAMIN D 25 HYDROXY: CPT

## 2021-11-17 PROCEDURE — 82607 VITAMIN B-12: CPT

## 2021-11-17 PROCEDURE — 80061 LIPID PANEL: CPT

## 2021-11-18 LAB
ALBUMIN SERPL-MCNC: 3.9 G/DL (ref 3.4–5)
ALP SERPL-CCNC: 34 U/L (ref 40–150)
ALT SERPL W P-5'-P-CCNC: 25 U/L (ref 0–50)
ANION GAP SERPL CALCULATED.3IONS-SCNC: 5 MMOL/L (ref 3–14)
AST SERPL W P-5'-P-CCNC: 18 U/L (ref 0–45)
BILIRUB SERPL-MCNC: 0.8 MG/DL (ref 0.2–1.3)
BUN SERPL-MCNC: 8 MG/DL (ref 7–30)
CALCIUM SERPL-MCNC: 9.1 MG/DL (ref 8.5–10.1)
CHLORIDE BLD-SCNC: 102 MMOL/L (ref 94–109)
CHOLEST SERPL-MCNC: 226 MG/DL
CO2 SERPL-SCNC: 28 MMOL/L (ref 20–32)
CREAT SERPL-MCNC: 0.61 MG/DL (ref 0.52–1.04)
DEPRECATED CALCIDIOL+CALCIFEROL SERPL-MC: 28 UG/L (ref 20–75)
FASTING STATUS PATIENT QL REPORTED: ABNORMAL
GFR SERPL CREATININE-BSD FRML MDRD: >90 ML/MIN/1.73M2
GLUCOSE BLD-MCNC: 76 MG/DL (ref 70–99)
HCV AB SERPL QL IA: NONREACTIVE
HDLC SERPL-MCNC: 74 MG/DL
HIV 1+2 AB+HIV1 P24 AG SERPL QL IA: NONREACTIVE
LDLC SERPL CALC-MCNC: 139 MG/DL
NONHDLC SERPL-MCNC: 152 MG/DL
POTASSIUM BLD-SCNC: 3.7 MMOL/L (ref 3.4–5.3)
PROT SERPL-MCNC: 8.1 G/DL (ref 6.8–8.8)
SODIUM SERPL-SCNC: 135 MMOL/L (ref 133–144)
TRIGL SERPL-MCNC: 65 MG/DL

## 2022-01-12 ENCOUNTER — HOSPITAL ENCOUNTER (EMERGENCY)
Facility: CLINIC | Age: 47
Discharge: HOME | End: 2022-01-12
Attending: EMERGENCY MEDICINE | Admitting: EMERGENCY MEDICINE
Payer: COMMERCIAL

## 2022-01-12 VITALS
SYSTOLIC BLOOD PRESSURE: 118 MMHG | HEIGHT: 59 IN | OXYGEN SATURATION: 100 % | BODY MASS INDEX: 19.15 KG/M2 | TEMPERATURE: 98.3 F | WEIGHT: 95 LBS | DIASTOLIC BLOOD PRESSURE: 80 MMHG | RESPIRATION RATE: 18 BRPM | HEART RATE: 68 BPM

## 2022-01-12 DIAGNOSIS — R42 LIGHTHEADEDNESS: ICD-10-CM

## 2022-01-12 DIAGNOSIS — R19.7 VOMITING AND DIARRHEA: ICD-10-CM

## 2022-01-12 DIAGNOSIS — R11.10 VOMITING AND DIARRHEA: ICD-10-CM

## 2022-01-12 LAB
ALBUMIN UR-MCNC: 30 MG/DL
AMORPH CRY #/AREA URNS HPF: ABNORMAL /HPF
ANION GAP SERPL CALCULATED.3IONS-SCNC: 5 MMOL/L (ref 3–14)
APPEARANCE UR: CLEAR
BACTERIA #/AREA URNS HPF: ABNORMAL /HPF
BASOPHILS # BLD AUTO: 0 10E3/UL (ref 0–0.2)
BASOPHILS NFR BLD AUTO: 0 %
BILIRUB UR QL STRIP: NEGATIVE
BUN SERPL-MCNC: 11 MG/DL (ref 7–30)
CALCIUM SERPL-MCNC: 9.1 MG/DL (ref 8.5–10.1)
CHLORIDE BLD-SCNC: 102 MMOL/L (ref 94–109)
CO2 SERPL-SCNC: 28 MMOL/L (ref 20–32)
COLOR UR AUTO: YELLOW
CREAT SERPL-MCNC: 0.6 MG/DL (ref 0.52–1.04)
EOSINOPHIL # BLD AUTO: 0 10E3/UL (ref 0–0.7)
EOSINOPHIL NFR BLD AUTO: 0 %
ERYTHROCYTE [DISTWIDTH] IN BLOOD BY AUTOMATED COUNT: 12.8 % (ref 10–15)
FLUAV RNA SPEC QL NAA+PROBE: NEGATIVE
FLUBV RNA RESP QL NAA+PROBE: NEGATIVE
GFR SERPL CREATININE-BSD FRML MDRD: >90 ML/MIN/1.73M2
GLUCOSE BLD-MCNC: 109 MG/DL (ref 70–99)
GLUCOSE UR STRIP-MCNC: NEGATIVE MG/DL
HCG UR QL: NEGATIVE
HCT VFR BLD AUTO: 42.2 % (ref 35–47)
HGB BLD-MCNC: 13.7 G/DL (ref 11.7–15.7)
HGB UR QL STRIP: ABNORMAL
HOLD SPECIMEN: NORMAL
HOLD SPECIMEN: NORMAL
IMM GRANULOCYTES # BLD: 0 10E3/UL
IMM GRANULOCYTES NFR BLD: 0 %
KETONES UR STRIP-MCNC: 60 MG/DL
LEUKOCYTE ESTERASE UR QL STRIP: NEGATIVE
LYMPHOCYTES # BLD AUTO: 1.1 10E3/UL (ref 0.8–5.3)
LYMPHOCYTES NFR BLD AUTO: 11 %
MCH RBC QN AUTO: 31.4 PG (ref 26.5–33)
MCHC RBC AUTO-ENTMCNC: 32.5 G/DL (ref 31.5–36.5)
MCV RBC AUTO: 97 FL (ref 78–100)
MONOCYTES # BLD AUTO: 0.5 10E3/UL (ref 0–1.3)
MONOCYTES NFR BLD AUTO: 4 %
MUCOUS THREADS #/AREA URNS LPF: PRESENT /LPF
NEUTROPHILS # BLD AUTO: 8.5 10E3/UL (ref 1.6–8.3)
NEUTROPHILS NFR BLD AUTO: 85 %
NITRATE UR QL: NEGATIVE
NRBC # BLD AUTO: 0 10E3/UL
NRBC BLD AUTO-RTO: 0 /100
PH UR STRIP: 6.5 [PH] (ref 5–7)
PLATELET # BLD AUTO: 345 10E3/UL (ref 150–450)
POTASSIUM BLD-SCNC: 3.6 MMOL/L (ref 3.4–5.3)
RBC # BLD AUTO: 4.36 10E6/UL (ref 3.8–5.2)
RBC URINE: 32 /HPF
SARS-COV-2 RNA RESP QL NAA+PROBE: NEGATIVE
SODIUM SERPL-SCNC: 135 MMOL/L (ref 133–144)
SP GR UR STRIP: 1.01 (ref 1–1.03)
SQUAMOUS EPITHELIAL: 5 /HPF
UROBILINOGEN UR STRIP-MCNC: NORMAL MG/DL
WBC # BLD AUTO: 10.1 10E3/UL (ref 4–11)
WBC URINE: 1 /HPF

## 2022-01-12 PROCEDURE — 82310 ASSAY OF CALCIUM: CPT | Performed by: EMERGENCY MEDICINE

## 2022-01-12 PROCEDURE — 85014 HEMATOCRIT: CPT | Performed by: EMERGENCY MEDICINE

## 2022-01-12 PROCEDURE — 96376 TX/PRO/DX INJ SAME DRUG ADON: CPT

## 2022-01-12 PROCEDURE — 36415 COLL VENOUS BLD VENIPUNCTURE: CPT | Performed by: EMERGENCY MEDICINE

## 2022-01-12 PROCEDURE — 81001 URINALYSIS AUTO W/SCOPE: CPT | Performed by: EMERGENCY MEDICINE

## 2022-01-12 PROCEDURE — 250N000013 HC RX MED GY IP 250 OP 250 PS 637: Performed by: EMERGENCY MEDICINE

## 2022-01-12 PROCEDURE — 258N000003 HC RX IP 258 OP 636: Performed by: EMERGENCY MEDICINE

## 2022-01-12 PROCEDURE — 96374 THER/PROPH/DIAG INJ IV PUSH: CPT

## 2022-01-12 PROCEDURE — 99284 EMERGENCY DEPT VISIT MOD MDM: CPT | Mod: 25

## 2022-01-12 PROCEDURE — C9803 HOPD COVID-19 SPEC COLLECT: HCPCS

## 2022-01-12 PROCEDURE — 81025 URINE PREGNANCY TEST: CPT | Performed by: EMERGENCY MEDICINE

## 2022-01-12 PROCEDURE — 250N000011 HC RX IP 250 OP 636: Performed by: EMERGENCY MEDICINE

## 2022-01-12 PROCEDURE — 87636 SARSCOV2 & INF A&B AMP PRB: CPT | Performed by: EMERGENCY MEDICINE

## 2022-01-12 PROCEDURE — 96361 HYDRATE IV INFUSION ADD-ON: CPT

## 2022-01-12 RX ORDER — ONDANSETRON 2 MG/ML
4 INJECTION INTRAMUSCULAR; INTRAVENOUS EVERY 30 MIN PRN
Status: DISCONTINUED | OUTPATIENT
Start: 2022-01-12 | End: 2022-01-12 | Stop reason: HOSPADM

## 2022-01-12 RX ORDER — ONDANSETRON 4 MG/1
4 TABLET, ORALLY DISINTEGRATING ORAL EVERY 8 HOURS PRN
Qty: 10 TABLET | Refills: 0 | Status: SHIPPED | OUTPATIENT
Start: 2022-01-12 | End: 2022-01-15

## 2022-01-12 RX ORDER — MECLIZINE HYDROCHLORIDE 25 MG/1
25 TABLET ORAL ONCE
Status: COMPLETED | OUTPATIENT
Start: 2022-01-12 | End: 2022-01-12

## 2022-01-12 RX ORDER — ONDANSETRON 2 MG/ML
4 INJECTION INTRAMUSCULAR; INTRAVENOUS ONCE
Status: DISCONTINUED | OUTPATIENT
Start: 2022-01-12 | End: 2022-01-12 | Stop reason: HOSPADM

## 2022-01-12 RX ADMIN — SODIUM CHLORIDE 1000 ML: 9 INJECTION, SOLUTION INTRAVENOUS at 11:26

## 2022-01-12 RX ADMIN — ONDANSETRON 4 MG: 2 INJECTION INTRAMUSCULAR; INTRAVENOUS at 11:25

## 2022-01-12 RX ADMIN — MECLIZINE HYDROCHLORIDE 25 MG: 25 TABLET ORAL at 11:26

## 2022-01-12 ASSESSMENT — MIFFLIN-ST. JEOR: SCORE: 976.55

## 2022-01-12 ASSESSMENT — ENCOUNTER SYMPTOMS
DIARRHEA: 1
BACK PAIN: 1
DIZZINESS: 1
LIGHT-HEADEDNESS: 1
NAUSEA: 1
VOMITING: 1

## 2022-01-12 NOTE — ED PROVIDER NOTES
"  History   Chief Complaint:  Nausea & Vomiting (VOMITING and dizziness since this am at 0500 with diarrhea)     The history is provided by the patient.      Carlos Eng is a 46 year old female with history of hypercholesterolemia who presents with nausea and vomiting. This morning the patient got to work around 0500 and started feeling lightheaded and began vomiting. She returned home and started feeling sharp pains in her back and became very dizzy. She also reports having some diarrhea this morning. She states that she currently feels fine as long as she stays sitting upright. She denies any recent travel or eating any food that she was worried about. She reports that a lot of people at her work have COVID. She has a history of two c-sections as well as fibroids found in her abdomen in 2017 that she has not followed up about. She denies having vertigo but states that her daughter does.    Review of Systems   Gastrointestinal: Positive for diarrhea, nausea and vomiting.   Musculoskeletal: Positive for back pain.   Neurological: Positive for dizziness and light-headedness.   All other systems reviewed and are negative.      Allergies:  The patient has no known allergies.     Medications:  Cholecalciferol   Blisovi     Past Medical History:     External hemorrhoids  Hypercholesterolemia  Excessive or frequent mensuration since 10-16  Intramural leiomyoma of uterus      Past Surgical History:    Lamont placement for scoliosis  Colonoscopy  C section      Family History:    Diabetes  Endocrine disease  Colon cancer    Social History:  Presents unaccompanied.   PCP: [facility]    Physical Exam     Patient Vitals for the past 24 hrs:   BP Temp Temp src Pulse Resp SpO2 Height Weight   01/12/22 1200 118/80 -- -- 68 18 100 % -- --   01/12/22 1130 114/73 -- -- 68 16 100 % -- --   01/12/22 0749 111/64 98.3  F (36.8  C) Oral 79 -- 99 % 1.499 m (4' 11\") 43.1 kg (95 lb)       Physical Exam  General: Patient " is alert and normal appearing.  HEENT: Head atraumatic    Eyes: pupils equal and reactive. Conjunctiva clear   Nares: patent   Oropharynx: no lesions, uvula midline, no palatal draping, normal voice, no trismus  Neck: Supple without lymphadenopathy, no meningismus  Chest: Heart regular rate and rhythm.   Lungs: Equal clear to auscultation with no wheeze or rales  Abdomen: Soft, non tender, nondistended, normal bowel sounds  Back: No costovertebral angle tenderness, no midline C, T or L spine tenderness  Neuro: Grossly nonfocal, normal speech, strength equal bilaterally, CN 2-12 intact  Extremities: No deformities, equal radial and DP pulses. No clubbing, cyanosis.  No edema  Skin: Warm and dry with no rash.       Emergency Department Course     Laboratory:  Labs Ordered and Resulted from Time of ED Arrival to Time of ED Departure   BASIC METABOLIC PANEL - Abnormal       Result Value    Sodium 135      Potassium 3.6      Chloride 102      Carbon Dioxide (CO2) 28      Anion Gap 5      Urea Nitrogen 11      Creatinine 0.60      Calcium 9.1      Glucose 109 (*)     GFR Estimate >90     ROUTINE UA WITH MICROSCOPIC REFLEX TO CULTURE - Abnormal    Color Urine Yellow      Appearance Urine Clear      Glucose Urine Negative      Bilirubin Urine Negative      Ketones Urine 60  (*)     Specific Gravity Urine 1.015      Blood Urine Moderate (*)     pH Urine 6.5      Protein Albumin Urine 30  (*)     Urobilinogen Urine Normal      Nitrite Urine Negative      Leukocyte Esterase Urine Negative      Bacteria Urine Few (*)     Mucus Urine Present (*)     Amorphous Crystals Urine Few (*)     RBC Urine 32 (*)     WBC Urine 1      Squamous Epithelials Urine 5 (*)    CBC WITH PLATELETS AND DIFFERENTIAL - Abnormal    WBC Count 10.1      RBC Count 4.36      Hemoglobin 13.7      Hematocrit 42.2      MCV 97      MCH 31.4      MCHC 32.5      RDW 12.8      Platelet Count 345      % Neutrophils 85      % Lymphocytes 11      % Monocytes 4      %  Eosinophils 0      % Basophils 0      % Immature Granulocytes 0      NRBCs per 100 WBC 0      Absolute Neutrophils 8.5 (*)     Absolute Lymphocytes 1.1      Absolute Monocytes 0.5      Absolute Eosinophils 0.0      Absolute Basophils 0.0      Absolute Immature Granulocytes 0.0      Absolute NRBCs 0.0     INFLUENZA A/B & SARS-COV2 PCR MULTIPLEX - Normal    Influenza A PCR Negative      Influenza B PCR Negative      SARS CoV2 PCR Negative     HCG QUALITATIVE URINE - Normal    hCG Urine Qualitative Negative          Emergency Department Course:    Reviewed:  I reviewed nursing notes, vitals, past medical history, Care Everywhere and [facility]    Assessments:  1102 I obtained history and examined the patient as noted above.   1225 I rechecked the patient and explained findings.     Interventions:  1125 Zofran 4 mg IV  1126 NS 1L IV  1126 Antivert 25 mg po    Disposition:  The patient was discharged to home.     Impression & Plan     Medical Decision Making:  Carlos Eng is a 46 year old female who presents for evaluation of nausea, vomiting and diarrhea with no abdominal pain in a nonfocal abdominal exam.  There are  ill contacts.  I considered a broad differential diagnosis for this patient including viral gastroenteritis, bacterial infection of the large intestine (salmonella, shigella, campylobacter, e coli, etc), bowel obstruction, intra-abdominal infection such as colitis, food poisoning, cholecystitis, UTI, pyelonephritis, appendicitis, etc.  There are no signs of worrisome intra-abdominal pathologies detected during the visit today.  She has a completely benign abdominal exam without rebound, guarding, or marked tenderness to palpation.  Supportive outpatient management is therefore indicated.  Abdominal pain precautions are given for home.   No indication for stool studies at this time.  No indication for CT at this time.  Patient did have some mild vertiginous type symptoms after all of her vomiting and  diarrhea.  This improved with IV fluids, Zofran, Antivert.  She will be discharged with further Zofran if needed.  Encouraged to advance her diet as tolerated.  No evidence of acute CVA.  If she has further vertigo, numbness, weakness, change in vision or speech she understands need to return immediately to the emergency department she passed oral challenge here in ED. It was discussed to return to the ED for blood in stool, increasing pain, or fevers more than 102.   Feels much improved after interventions in ED.       Covid-19  Carlos Eng was evaluated during a global COVID-19 pandemic, which necessitated consideration that the patient might be at risk for infection with the SARS-CoV-2 virus that causes COVID-19.   Applicable protocols for evaluation were followed during the patient's care.   COVID-19 was considered as part of the patient's evaluation. The plan for testing is:  a test was obtained during this visit.    Diagnosis:    ICD-10-CM    1. Vomiting and diarrhea  R11.10     R19.7    2. Lightheadedness  R42        Discharge Medications:  New Prescriptions    No medications on file     Scribe Disclosure:  June WREN, am serving as a scribe at 11:03 AM on 1/12/2022 to document services personally performed by Shauna Vila MD based on my observations and the provider's statements to me.            Shauna Vila MD  01/12/22 [id number]

## 2022-01-21 ENCOUNTER — HOSPITAL ENCOUNTER (OUTPATIENT)
Dept: ULTRASOUND IMAGING | Facility: CLINIC | Age: 47
Discharge: HOME | End: 2022-01-21
Attending: INTERNAL MEDICINE | Admitting: INTERNAL MEDICINE
Payer: COMMERCIAL

## 2022-01-21 DIAGNOSIS — D25.9 UTERINE LEIOMYOMA, UNSPECIFIED LOCATION: ICD-10-CM

## 2022-01-21 PROCEDURE — 76830 TRANSVAGINAL US NON-OB: CPT

## 2022-01-24 ENCOUNTER — TELEPHONE (OUTPATIENT)
Dept: FAMILY MEDICINE | Facility: CLINIC | Age: 47
End: 2022-01-24
Payer: COMMERCIAL

## 2022-01-24 NOTE — TELEPHONE ENCOUNTER
----- Message from Alexandra Mayorga MD sent at 1/22/2022  1:18 AM CST -----  Your Ultrasound pelvis is positive for Fibroid uterus, Please follow up with your OBGYN.     Thank you,  Alexandra Mayorga MD on 1/22/2022

## 2022-02-08 DIAGNOSIS — Z00.00 ROUTINE GENERAL MEDICAL EXAMINATION AT A HEALTH CARE FACILITY: ICD-10-CM

## 2022-02-08 DIAGNOSIS — N92.0 EXCESSIVE OR FREQUENT MENSTRUATION: ICD-10-CM

## 2022-02-08 RX ORDER — NORETHINDRONE ACETATE AND ETHINYL ESTRADIOL 1MG-20(21)
1 KIT ORAL DAILY
Qty: 84 TABLET | Refills: 3 | Status: SHIPPED | OUTPATIENT
Start: 2022-02-08 | End: 2022-12-22

## 2022-02-08 NOTE — TELEPHONE ENCOUNTER
Prescription approved per [facility] Refill Protocol.    Aissatou TREJO RN  EP Triage

## 2022-04-07 ENCOUNTER — OFFICE VISIT (OUTPATIENT)
Dept: OBGYN | Facility: CLINIC | Age: 47
End: 2022-04-07
Payer: COMMERCIAL

## 2022-04-07 VITALS
SYSTOLIC BLOOD PRESSURE: 98 MMHG | BODY MASS INDEX: 18.95 KG/M2 | HEART RATE: 68 BPM | DIASTOLIC BLOOD PRESSURE: 50 MMHG | WEIGHT: 94 LBS | HEIGHT: 59 IN

## 2022-04-07 DIAGNOSIS — N92.1 MENORRHAGIA WITH IRREGULAR CYCLE: ICD-10-CM

## 2022-04-07 DIAGNOSIS — D25.0 INTRAMURAL AND SUBMUCOUS LEIOMYOMA OF UTERUS: Primary | ICD-10-CM

## 2022-04-07 DIAGNOSIS — D25.1 INTRAMURAL AND SUBMUCOUS LEIOMYOMA OF UTERUS: Primary | ICD-10-CM

## 2022-04-07 PROCEDURE — 99204 OFFICE O/P NEW MOD 45 MIN: CPT | Performed by: OBSTETRICS & GYNECOLOGY

## 2022-04-07 NOTE — PROGRESS NOTES
"  SUBJECTIVE:                                                  Carlos Eng is a 46 year old female  who presents to clinic today for consultation regarding uterine fibroids.    She first found out she had fibroids in 2017, when she first started to experience heavy and irregular periods.  At that time, an ultrasound was done which showed multiple fibroids measuring between 1.1 to 1.5 cm in largest dimension.  She recently started oral contraceptive pills for periods that were increasingly heavy, bleeding is often as every 2 weeks.  These are typically heavy for 1 to 2 days.  The birth control pill has made these lighter and more regular.  She underwent a follow-up ultrasound which showed that the uterus was enlarged overall, 10.8 x 8.6 x 6.2 cm, with at least 3 fibroids, the largest of which was 3.4 cm in diameter and fundal.  She is noted some lower abdominal discomfort and can feel the fibroid through the abdominal wall.    She does not know a lot about treatment options, but states that she does not want to continue taking a birth control pill daily.  She is asking about the possibility of hysterectomy as she would like to \"be done with this.\"        Problem list and histories reviewed & adjusted, as indicated.  Additional history: as documented.    Patient Active Problem List   Diagnosis     Family history of diabetes mellitus     External hemorrhoids     Screening for diabetes mellitus     Hypercholesterolemia     Excessive or frequent menstruation since 10-16     Intramural leiomyoma of uterus     Family history of colon cancer     Past Surgical History:   Procedure Laterality Date     BACK SURGERY      mayte placement for scoliosis     COLONOSCOPY N/A 2021    Procedure: COLONOSCOPY, WITH POLYPECTOMY AND BIOPSY;  Surgeon: Delma Keane MD;  Location:  GI     GYN SURGERY  ,           Social History     Tobacco Use     Smoking status: Never Smoker     Smokeless tobacco: Never " Used   Substance Use Topics     Alcohol use: Yes     Comment: Occ      Problem (# of Occurrences) Relation (Name,Age of Onset)    Cancer (1) Father    Cerebrovascular Disease (2) Maternal Grandmother, Maternal Uncle    Colon Cancer (1) Sister    Diabetes (2) Mother, Maternal Uncle    Endocrine Disease (1) Mother       Negative family history of: Coronary Artery Disease, Hypertension, Hyperlipidemia, Breast Cancer, Prostate Cancer, Other Cancer, Depression, Anxiety Disorder, Mental Illness, Substance Abuse, Anesthesia Reaction, Asthma, Osteoporosis, Genetic Disorder, Thyroid Disease, Obesity, Unknown/Adopted            cholecalciferol 5000 UNITS CAPS, Take 1 capsule by mouth daily.  norethindrone-ethinyl estradiol (BLISOVI FE 1/20) 1-20 MG-MCG tablet, Take 1 tablet by mouth daily    No current facility-administered medications on file prior to visit.    No Known Allergies    ROS:  Negative except as noted    OBJECTIVE:     Exam:  Constitutional:  Appearance: Well nourished, well developed alert, in no acute distress  Chest:  Respiratory Effort:  Breathing unlabored  Gastrointestinal:  Abdominal Examination:  Abdomen nontender to palpation, tone normal without rigidity or guarding, fibroids are palpable approximately 4 fingerbreadths above the pubic symphysis.  Skin:General Inspection:  No rashes present, no lesions present, no areas of discoloration  Neurologic/Psychiatric:  Mental Status:  Oriented X3   Pelvis: EGBUS within normal limits.  On bimanual exam the uterus is enlarged and mobile, with at least 2 discrete fibroids palpable, 1 anterior and one posterior.      In-Clinic Test Results:  No results found for this or any previous visit (from the past 24 hour(s)).    ASSESSMENT/PLAN:                                                        ICD-10-CM    1. Intramural and submucous leiomyoma of uterus  D25.1     D25.0    2. Menorrhagia with irregular cycle  N92.1          We discussed uterine fibroids, including the  fact that this is a common finding, that these are benign growths, and that most of the time there is no effect on fertility. This can be a cause of heavy periods. Treatment options include medical therapy for heavy periods (oral contraceptive pills, progestin birth control methods, cyclic progesterone, Mirena IUD) and surgical therapy (myomectomy) for those wishing to preserve fertility; MRI-guided focused US, uterine artery embolization, or hysterectomy for those who are not planning children.     She is actually done quite a bit of thinking about this, and is most interested in proceeding with hysterectomy. We discussed the risks of surgery including, but not limited to, risks of anesthesia, risk of bleeding with possibility of transfusion, infection, injury to abdominal/pelvic organs, blood vessels, or nerves and possible need for second surgery for unrecognized injury. She stated her understanding and was encouraged to contact me if she has any other questions prior to the day of her scheduled procedure.    She is electing to proceed with total laparoscopic hysterectomy, and will be contacted by the  to arrange. She knows she will need H&P with her primary care provider.     Karol Kang MD  [facility]

## 2022-04-07 NOTE — NURSING NOTE
"Chief Complaint   Patient presents with     Consult       Initial BP 98/50   Pulse 68   Ht 1.499 m (4' 11\")   Wt 42.6 kg (94 lb)   LMP 03/27/2022   Breastfeeding No   BMI 18.99 kg/m   Estimated body mass index is 18.99 kg/m  as calculated from the following:    Height as of this encounter: 1.499 m (4' 11\").    Weight as of this encounter: 42.6 kg (94 lb).  BP completed using cuff size: regular    Questioned patient about current smoking habits.  Pt. has never smoked.      G:3 P:2    The following HM Due: NONE      The following patient reported/Care Every where data was sent to:  P ABSTRACT QUALITY INITIATIVES [[id number]]  Nilsa Young LPN               "

## 2022-04-07 NOTE — PATIENT INSTRUCTIONS
"What to expect from a hysterectomy (removal of the uterus)    Remember that this may be done through an abdominal incision (abdominal hysterectomy), through multiple small incisions in the abdomen (laparoscopic hysterectomy), or through the vagina (no incisions on the abdomen, called a vaginal hysterectomy). The type of hysterectomy you are having is called total laparoscopic hysterectomy.    Prior to the day of your procedure:    It is recommended that you buy over the counter pain relief medicine to use with the medicine you will get when you are discharged after surgery:    -ibuprofen (Advil, Motrin, or generic is fine)  -tylenol (regular or extra strength)  -a stool softener, such as Colace or Senokot. Constipation can happen after surgery, and this medication should be taken twice a day until bowel movements are back to normal.    Day of procedure or sooner:  A health care provider will explain the procedure in detail, including possible complications and side effects. He or she will also answer your questions.  In addition:   Blood and urine tests are taken   An enema or bowel prep may be given to cleanse the bowel   Abdominal and pelvic areas may require some hair removal   An intravenous (IV) line is placed in a vein in your arm to deliver medications and fluids   During the procedure  An anesthesiologist will give you either:   General anesthesia in which you will not be awake during the procedure; or   Regional anesthesia (also called epidural or spinal anesthesia) in which medications are placed near the nerves in your lower back to \"block\" pain while you stay awake (not an option for laparoscopic hysterectomy)  The surgeon removes the uterus through an incision in your abdomen or vagina or through several small incisions during laparoscopy. The method used during surgery depends on why you need the surgery and the results of your pelvic exam.  How long does the procedure last?  The procedure lasts 1 to 3 " hours in most cases. The amount of time you spend in the hospital for recovery varies, depending on the type of surgery performed. For most vaginal hysterectomies and laparoscopic hysterectomies, you will not need to stay in the hospital overnight.  The day of discharge  A responsible adult must drive you home the day you are discharged from the hospital. Someone will need to stay with you overnight the first night after surgery if you are discharged home.  Home recovery   You may resume your normal diet, as tolerated.   You may take a bath or shower. Wash the incision with soap and water (the stitches do not have to be removed, as they will dissolve in about 2-6 weeks). A dressing over the incision is not necessary. Do not submerge the skin incisions until they are completely healed (usually one week).  You may use lotions and creams on the skin around the incision to relieve itching.   Increase your activity gradually every day, when you feel capable and aren't in pain. Completely normal activities can be resumed within 6 weeks or potentially sooner if the procedure was performed vaginally or through the laparoscope.   Drive when you feel capable and are no longer requiring narcotic pain medications-- about 1-2 weeks after surgery.   You can travel out of town 2-3 weeks after surgery, including air travel.   Avoid lifting heavy objects (over 15 pounds) for at least 6 weeks.   Do not douche or put anything into the vagina for 6-8 weeks.   You may have intercourse 6-8 weeks after surgery, if cleared by your health care provider.   Light swimming may be permitted 2 weeks after surgery in a swimming pool, but avoid vigorous swimming until 6 weeks after surgery.   Resume your exercise routine in about 4-6 weeks, depending on how you feel.   Your doctor can tell you when it's best to go back to work. You can usually go back to work in 2 to 6 weeks, depending on the procedure.   How will I feel after  hysterectomy?  Physically  After hysterectomy, your periods will stop. Occasionally, you may feel bloated and have symptoms similar to when you were menstruating. It is normal to have light vaginal bleeding or a dark brown discharge for about 4 to 6 weeks after surgery. This bleeding or discharge can come and go.  You may feel discomfort at the incision site for about 4 weeks, and any redness, bruising or swelling will disappear in 4 to 6 weeks. Feeling burning or itching around the incision is normal. You may also experience a numb feeling around the incision and down your leg. This is normal and, if present, usually lasts about 2 months.  Most people experience moderate soreness in the abdomen and increased fatigue levels for 1 to 4 weeks following surgery.   If the ovaries remain, you should not experience hormone-related effects, but you may continue to have period symptoms (breast tenderness, moodiness, bloating, headache, low back pain) monthly. If the ovaries were removed with the uterus before menopause, you may experience the symptoms that often occur with menopause, such as hot flashes. If you develop these symptoms we can discuss hormone replacement therapy to relieve menopausal symptoms.  Emotionally  Emotional reactions to hysterectomy vary, depending on how well you were prepared for the surgery, the reason for having it, and whether the problem has been treated.  Some women may feel a sense of loss or become depressed, but these emotional reactions are usually temporary. Other women may find that hysterectomy improves their health and well-being, and may even be a life-saving operation. Please discuss your emotional concerns with your health care provider.  What are the complications of hysterectomy?  As with any surgery, there is a slight chance that problems may occur. Problems could include blood clots, severe infection, bleeding after surgery, bowel blockage or injury, urinary tract injury, or  problems related to anesthesia.  When should I call my health care provider?  Call your health care provider if you have:   Bright red vaginal bleeding that is as heavy as a period  A fever over 100.4 F   Severe nausea or vomiting   Difficulty urinating, burning feeling when urinating, or frequent urination   Increasing amount of pain   Increasing redness, swelling, or drainage from your incision   Inability to pass gas for 24 hours after surgery

## 2022-04-08 ENCOUNTER — TELEPHONE (OUTPATIENT)
Dept: OBGYN | Facility: CLINIC | Age: 47
End: 2022-04-08
Payer: COMMERCIAL

## 2022-04-08 NOTE — TELEPHONE ENCOUNTER
Surgeon:LOI RILEY   Assist:  Dr. Grayson   Location: [facility]   Date/time preference:  per patient     Surgery:  total laparoscopic hysterectomy, bilateral salpingectomy   Length of Surgery:  1.5nh   Diagnosis:  fibroids, menorrhagia   Anesthesia type:  GENERAL     Special instructions / equipment:  allmartin posada   Am admit or same day: SAME DAY   Bowel prep: No   Pre op: PCP   Office visit with surgeon prior to surgery: No   Schedule postop visit: 2 and 8 weeks     ThanksLoi MD

## 2022-04-11 DIAGNOSIS — Z01.812 ENCOUNTER FOR PREOPERATIVE SCREENING LABORATORY TESTING FOR COVID-19 VIRUS: Primary | ICD-10-CM

## 2022-04-11 DIAGNOSIS — Z11.52 ENCOUNTER FOR PREOPERATIVE SCREENING LABORATORY TESTING FOR COVID-19 VIRUS: Primary | ICD-10-CM

## 2022-04-11 NOTE — TELEPHONE ENCOUNTER
Type of surgery: total laparoscopic hysterectomy, bilateral salpingectomy  Location of surgery: [facility]  Date and time of surgery: 8/17/22 @ 8:00 am  Surgeon: Dr. Kang  Pre-Op Appt Date: Patient advised to schedule.  Post-Op Appt Date: Patient advised to schedule.   Packet sent out: Yes  Pre-cert/Authorization completed:  No  Date: 4/11/22

## 2022-04-18 ENCOUNTER — ANCILLARY PROCEDURE (OUTPATIENT)
Dept: MAMMOGRAPHY | Facility: CLINIC | Age: 47
End: 2022-04-18
Attending: INTERNAL MEDICINE
Payer: COMMERCIAL

## 2022-04-18 DIAGNOSIS — Z12.31 VISIT FOR SCREENING MAMMOGRAM: ICD-10-CM

## 2022-04-18 PROCEDURE — 77063 BREAST TOMOSYNTHESIS BI: CPT | Mod: TC | Performed by: RADIOLOGY

## 2022-04-18 PROCEDURE — 77067 SCR MAMMO BI INCL CAD: CPT | Mod: TC | Performed by: RADIOLOGY

## 2022-08-01 ENCOUNTER — TELEPHONE (OUTPATIENT)
Dept: OBGYN | Facility: CLINIC | Age: 47
End: 2022-08-01

## 2022-08-01 NOTE — TELEPHONE ENCOUNTER
Form received from: [facility] labor and wage division      Form requesting following info/need: 's form for time off to take care of PT Carlos Eng      RUBEN needed?: no     Location of form: above Paz's desk      When completed the route for return: please call PT when ready, needs to be filled by the end of this week

## 2022-08-03 NOTE — TELEPHONE ENCOUNTER
Forms filled out, placed on Ronda's desk in [place] for review and signature by MD. Marcio Leonardo CMA

## 2022-08-04 NOTE — TELEPHONE ENCOUNTER
Form completed, signed and copy sent to be scanned in [facility], copy placed in Kang's nurse bin and original placed at  for pickup - [email or web address] message sent and VM left on husbands cell phone. Nilsa Young LPN

## 2022-08-09 ENCOUNTER — OFFICE VISIT (OUTPATIENT)
Dept: FAMILY MEDICINE | Facility: CLINIC | Age: 47
End: 2022-08-09
Payer: COMMERCIAL

## 2022-08-09 VITALS
HEART RATE: 68 BPM | SYSTOLIC BLOOD PRESSURE: 112 MMHG | RESPIRATION RATE: 16 BRPM | OXYGEN SATURATION: 99 % | WEIGHT: 93 LBS | DIASTOLIC BLOOD PRESSURE: 68 MMHG | BODY MASS INDEX: 18.78 KG/M2 | TEMPERATURE: 97.8 F

## 2022-08-09 DIAGNOSIS — Z01.818 PREOPERATIVE CLEARANCE: Primary | ICD-10-CM

## 2022-08-09 DIAGNOSIS — D25.1 INTRAMURAL LEIOMYOMA OF UTERUS: ICD-10-CM

## 2022-08-09 DIAGNOSIS — R00.1 SINUS BRADYCARDIA: ICD-10-CM

## 2022-08-09 LAB
ERYTHROCYTE [DISTWIDTH] IN BLOOD BY AUTOMATED COUNT: 13.5 % (ref 10–15)
HCT VFR BLD AUTO: 41.7 % (ref 35–47)
HGB BLD-MCNC: 13.2 G/DL (ref 11.7–15.7)
MCH RBC QN AUTO: 30.7 PG (ref 26.5–33)
MCHC RBC AUTO-ENTMCNC: 31.7 G/DL (ref 31.5–36.5)
MCV RBC AUTO: 97 FL (ref 78–100)
PLATELET # BLD AUTO: 322 10E3/UL (ref 150–450)
RBC # BLD AUTO: 4.3 10E6/UL (ref 3.8–5.2)
WBC # BLD AUTO: 5.7 10E3/UL (ref 4–11)

## 2022-08-09 PROCEDURE — 99214 OFFICE O/P EST MOD 30 MIN: CPT | Performed by: INTERNAL MEDICINE

## 2022-08-09 PROCEDURE — 80053 COMPREHEN METABOLIC PANEL: CPT | Performed by: INTERNAL MEDICINE

## 2022-08-09 PROCEDURE — 93000 ELECTROCARDIOGRAM COMPLETE: CPT | Performed by: INTERNAL MEDICINE

## 2022-08-09 PROCEDURE — 36415 COLL VENOUS BLD VENIPUNCTURE: CPT | Performed by: INTERNAL MEDICINE

## 2022-08-09 PROCEDURE — 85027 COMPLETE CBC AUTOMATED: CPT | Performed by: INTERNAL MEDICINE

## 2022-08-09 ASSESSMENT — PAIN SCALES - GENERAL: PAINLEVEL: NO PAIN (0)

## 2022-08-09 NOTE — PATIENT INSTRUCTIONS
As discussed, will do pertinent work up for your preop clearance.     ==================      Preparing for Your Surgery  Getting started  A nurse will call you to review your health history and instructions. They will give you an arrival time based on your scheduled surgery time. Please be ready to share:    Your doctor's clinic name and phone number    Your medical, surgical and anesthesia history    A list of allergies and sensitivities    A list of medicines, including herbal treatments and over-the-counter drugs    Whether the patient has a legal guardian (ask how to send us the papers in advance)  Please tell us if you're pregnant--or if there's any chance you might be pregnant. Some surgeries may injure a fetus (unborn baby), so they require a pregnancy test. Surgeries that are safe for a fetus don't always need a test, and you can choose whether to have one.   If you have a child who's having surgery, please ask for a copy of Preparing for Your Child's Surgery.    Preparing for surgery    Within 30 days of surgery: Have a pre-op exam (sometimes called an H&P, or History and Physical). This can be done at a clinic or pre-operative center.  ? If you're having a , you may not need this exam. Talk to your care team.    At your pre-op exam, talk to your care team about all medicines you take. If you need to stop any medicines before surgery, ask when to start taking them again.  ? We do this for your safety. Many medicines can make you bleed too much during surgery. Some change how well surgery (anesthesia) drugs work.    Call your insurance company to let them know you're having surgery. (If you don't have insurance, call [phone].)    Call your clinic if there's any change in your health. This includes signs of a cold or flu (sore throat, runny nose, cough, rash, fever). It also includes a scrape or scratch near the surgery site.    If you have questions on the day of surgery, call your hospital or  surgery center.  COVID testing  You may need to be tested for COVID-19 before having surgery. If so, we will give you instructions.  Eating and drinking guidelines  For your safety: Unless your surgeon tells you otherwise, follow the guidelines below.    Eat and drink as usual until 8 hours before surgery. After that, no food or milk.    Drink clear liquids until 2 hours before surgery. These are liquids you can see through, like water, Gatorade and Propel Water. You may also have black coffee and tea (no cream or milk).    Nothing by mouth within 2 hours of surgery. This includes gum, candy and breath mints.    If you drink alcohol: Stop drinking it the night before surgery.    If your care team tells you to take medicine on the morning of surgery, it's okay to take it with a sip of water.  Preventing infection    Shower or bathe the night before and morning of your surgery. Follow the instructions your clinic gave you. (If no instructions, use regular soap.)    Don't shave or clip hair near your surgery site. We'll remove the hair if needed.    Don't smoke or vape the morning of surgery. You may chew nicotine gum up to 2 hours before surgery. A nicotine patch is okay.  ? Note: Some surgeries require you to completely quit smoking and nicotine. Check with your surgeon.    Your care team will make every effort to keep you safe from infection. We will:  ? Clean our hands often with soap and water (or an alcohol-based hand rub).  ? Clean the skin at your surgery site with a special soap that kills germs.  ? Give you a special gown to keep you warm. (Cold raises the risk of infection.)  ? Wear special hair covers, masks, gowns and gloves during surgery.  ? Give antibiotic medicine, if prescribed. Not all surgeries need antibiotics.  What to bring on the day of surgery    Photo ID and insurance card    Copy of your health care directive, if you have one    Glasses and hearing aides (bring cases)  ? You can't wear  contacts during surgery    Inhaler and eye drops, if you use them (tell us about these when you arrive)    CPAP machine or breathing device, if you use them    A few personal items, if spending the night    If you have . . .  ? A pacemaker, ICD (cardiac defibrillator) or other implant: Bring the ID card.  ? An implanted stimulator: Bring the remote control.  ? A legal guardian: Bring a copy of the certified (court-stamped) guardianship papers.  Please remove any jewelry, including body piercings. Leave jewelry and other valuables at home.  If you're going home the day of surgery    You must have a responsible adult drive you home. They should stay with you overnight as well.    If you don't have someone to stay with you, and you aren't safe to go home alone, we may keep you overnight. Insurance often won't pay for this.  After surgery  If it's hard to control your pain or you need more pain medicine, please call your surgeon's office.  Questions?   If you have any questions for your care team, list them here: _________________________________________________________________________________________________________________________________________________________________________ ____________________________________ ____________________________________ ____________________________________  For informational purposes only. Not to replace the advice of your health care provider. Copyright   2003, 2019 [facility]. All rights reserved. Clinically reviewed by Safia Kelley MD. [employer] [id number] - REV 07/21.

## 2022-08-09 NOTE — PROGRESS NOTES
Called [facility] for surgery information.     Location: Surgery Center inside of the [facility]  DOS: 8/17/22 check in at 7:15am  Fax: [phone]    Giovana Ruth,  [facility]

## 2022-08-09 NOTE — PROGRESS NOTES
[address] [address]  Phone: [phone]  Primary Provider: Jonah King  Pre-op Performing Provider: JONAH KING      PREOPERATIVE EVALUATION:  Today's date: 8/9/2022    Carlos Eng is a 46 year old female who presents for a preoperative evaluation.    Surgical Information:  Surgery/Procedure: Hysterectomy   Surgery Location: [facility]  Surgeon:dr allan grande  Surgery Date: 08/17/22  Time of Surgery: 7 AM  Where patient plans to recover: At home with family  Fax number for surgical facility: patient will call wit exact location and fax # Will need to contact the surgery center to get this details.   ( Ph : [phone] )     Type of Anesthesia Anticipated: General    Assessment & Plan     The proposed surgical procedure is considered INTERMEDIATE risk.    Assessment and Plan  1. Preoperative clearance  2. Intramural leiomyoma of uterus  Pt is here for Preop clearance of Hysterectomy. Does have Uterine fibroid and she is here for preoperative clearance for hysterectomy. No major risk factors except last labs in 11/2021 and 1/2022 and EKG never done. WiIl recheck CBC , CMP and EKG.  Surgery is At [facility] which I do not see much details on Epic. All Hx given by the patient. Team to call the center on the details below .   - EKG 12-lead complete w/read - Clinics  - Comprehensive metabolic panel (BMP + Alb, Alk Phos, ALT, AST, Total. Bili, TP); Future  - CBC with platelets; Future    3. Sinus bradycardia  EKG finding with persistent bradycardia as per chart review . This is asymptomatic bradycardia.       Patient Instructions     As discussed, will do pertinent work up for your preop clearance.     ==================      Preparing for Your Surgery  Getting started  A nurse will call you to review your health history and instructions. They will give you an arrival time based on your  scheduled surgery time. Please be ready to share:    Your doctor's clinic name and phone number    Your medical, surgical and anesthesia history    A list of allergies and sensitivities    A list of medicines, including herbal treatments and over-the-counter drugs    Whether the patient has a legal guardian (ask how to send us the papers in advance)  Please tell us if you're pregnant--or if there's any chance you might be pregnant. Some surgeries may injure a fetus (unborn baby), so they require a pregnancy test. Surgeries that are safe for a fetus don't always need a test, and you can choose whether to have one.   If you have a child who's having surgery, please ask for a copy of Preparing for Your Child's Surgery.    Preparing for surgery    Within 30 days of surgery: Have a pre-op exam (sometimes called an H&P, or History and Physical). This can be done at a clinic or pre-operative center.  ? If you're having a , you may not need this exam. Talk to your care team.    At your pre-op exam, talk to your care team about all medicines you take. If you need to stop any medicines before surgery, ask when to start taking them again.  ? We do this for your safety. Many medicines can make you bleed too much during surgery. Some change how well surgery (anesthesia) drugs work.    Call your insurance company to let them know you're having surgery. (If you don't have insurance, call [phone].)    Call your clinic if there's any change in your health. This includes signs of a cold or flu (sore throat, runny nose, cough, rash, fever). It also includes a scrape or scratch near the surgery site.    If you have questions on the day of surgery, call your hospital or surgery center.  COVID testing  You may need to be tested for COVID-19 before having surgery. If so, we will give you instructions.  Eating and drinking guidelines  For your safety: Unless your surgeon tells you otherwise, follow the guidelines  below.    Eat and drink as usual until 8 hours before surgery. After that, no food or milk.    Drink clear liquids until 2 hours before surgery. These are liquids you can see through, like water, Gatorade and Propel Water. You may also have black coffee and tea (no cream or milk).    Nothing by mouth within 2 hours of surgery. This includes gum, candy and breath mints.    If you drink alcohol: Stop drinking it the night before surgery.    If your care team tells you to take medicine on the morning of surgery, it's okay to take it with a sip of water.  Preventing infection    Shower or bathe the night before and morning of your surgery. Follow the instructions your clinic gave you. (If no instructions, use regular soap.)    Don't shave or clip hair near your surgery site. We'll remove the hair if needed.    Don't smoke or vape the morning of surgery. You may chew nicotine gum up to 2 hours before surgery. A nicotine patch is okay.  ? Note: Some surgeries require you to completely quit smoking and nicotine. Check with your surgeon.    Your care team will make every effort to keep you safe from infection. We will:  ? Clean our hands often with soap and water (or an alcohol-based hand rub).  ? Clean the skin at your surgery site with a special soap that kills germs.  ? Give you a special gown to keep you warm. (Cold raises the risk of infection.)  ? Wear special hair covers, masks, gowns and gloves during surgery.  ? Give antibiotic medicine, if prescribed. Not all surgeries need antibiotics.  What to bring on the day of surgery    Photo ID and insurance card    Copy of your health care directive, if you have one    Glasses and hearing aides (bring cases)  ? You can't wear contacts during surgery    Inhaler and eye drops, if you use them (tell us about these when you arrive)    CPAP machine or breathing device, if you use them    A few personal items, if spending the night    If you have . . .  ? A pacemaker, ICD  (cardiac defibrillator) or other implant: Bring the ID card.  ? An implanted stimulator: Bring the remote control.  ? A legal guardian: Bring a copy of the certified (court-stamped) guardianship papers.  Please remove any jewelry, including body piercings. Leave jewelry and other valuables at home.  If you're going home the day of surgery    You must have a responsible adult drive you home. They should stay with you overnight as well.    If you don't have someone to stay with you, and you aren't safe to go home alone, we may keep you overnight. Insurance often won't pay for this.  After surgery  If it's hard to control your pain or you need more pain medicine, please call your surgeon's office.  Questions?   If you have any questions for your care team, list them here: _________________________________________________________________________________________________________________________________________________________________________ ____________________________________ ____________________________________ ____________________________________  For informational purposes only. Not to replace the advice of your health care provider. Copyright   2003, 2019 [employer]. All rights reserved. Clinically reviewed by Safia Kelley MD. [employer] [id number] - REV 07/21.      Return in about 6 months (around 2/9/2023), or if symptoms worsen or fail to improve, for Follow up of last visit, If symptoms persist.    Alexandra Mayorga MD  [facility] [place]      Risks and Recommendations:  The patient has the following additional risks and recommendations for perioperative complications:   - Consult Hospitalist / IM to assist with post-op medical management    Medication Instructions:  Patient is to take all scheduled medications on the day of surgery    RECOMMENDATION:  APPROVAL GIVEN to proceed with proposed procedure, without further diagnostic evaluation.    Review of external notes as documented  above   Independent interpretation of a test performed by another physician/other qualified health care professional (not separately reported) - EKG    30 minutes spent on the date of the encounter doing chart review, review of outside records, review of test results, interpretation of tests, patient visit and documentation         Subjective     HPI related to upcoming procedure:     Last seen pt on 11/2021 for Annual physical at that time. She is here for preop clearance of hysterectomy.     Preop Questions 8/8/2022   1. Have you ever had a heart attack or stroke? No   2. Have you ever had surgery on your heart or blood vessels, such as a stent placement, a coronary artery bypass, or surgery on an artery in your head, neck, heart, or legs? No   3. Do you have chest pain with activity? No   4. Do you have a history of  heart failure? No   5. Do you currently have a cold, bronchitis or symptoms of other infection? No   6. Do you have a cough, shortness of breath, or wheezing? No   7. Do you or anyone in your family have previous history of blood clots? YES    8. Do you or does anyone in your family have a serious bleeding problem such as prolonged bleeding following surgeries or cuts? No   9. Have you ever had problems with anemia or been told to take iron pills? No   10. Have you had any abnormal blood loss such as black, tarry or bloody stools, or abnormal vaginal bleeding? No   11. Have you ever had a blood transfusion? No   12. Are you willing to have a blood transfusion if it is medically needed before, during, or after your surgery? Yes   13. Have you or any of your relatives ever had problems with anesthesia? No   14. Do you have sleep apnea, excessive snoring or daytime drowsiness? No   15. Do you have any artifical heart valves or other implanted medical devices like a pacemaker, defibrillator, or continuous glucose monitor? No   16. Do you have artificial joints? No   17. Are you allergic to latex? No    18. Is there any chance that you may be pregnant? No       Health Care Directive:  Patient does not have a Health Care Directive or Living Will: N/A    Preoperative Review of :   reviewed - no record of controlled substances prescribed.    Status of Chronic Conditions:  See problem list for active medical problems.  Problems all longstanding and stable, except as noted/documented.  See ROS for pertinent symptoms related to these conditions.      Review of Systems  CONSTITUTIONAL: NEGATIVE for fever, chills, change in weight  INTEGUMENTARY/SKIN: NEGATIVE for worrisome rashes, moles or lesions  EYES: NEGATIVE for vision changes or irritation  ENT/MOUTH: NEGATIVE for ear, mouth and throat problems  RESP: NEGATIVE for significant cough or SOB  CV: NEGATIVE for chest pain, palpitations or peripheral edema  GI: NEGATIVE for nausea, abdominal pain, heartburn, or change in bowel habits  : NEGATIVE for frequency, dysuria, or hematuria  MUSCULOSKELETAL: NEGATIVE for significant arthralgias or myalgia  NEURO: NEGATIVE for weakness, dizziness or paresthesias  ENDOCRINE: NEGATIVE for temperature intolerance, skin/hair changes  HEME: NEGATIVE for bleeding problems  PSYCHIATRIC: NEGATIVE for changes in mood or affect    Patient Active Problem List    Diagnosis Date Noted     Sinus bradycardia 08/09/2022     Priority: Medium     Family history of colon cancer 09/25/2019     Priority: Medium     Sister--diagnosed age 50       Intramural leiomyoma of uterus 03/09/2017     Priority: Medium     Excessive or frequent menstruation since 10-16 02/24/2017     Priority: Medium     Hypercholesterolemia 04/29/2016     Priority: Medium     Screening for diabetes mellitus 04/22/2016     Priority: Medium     Family history of diabetes mellitus 06/20/2013     Priority: Medium     External hemorrhoids 06/20/2013     Priority: Medium      History reviewed. No pertinent past medical history.  Past Surgical History:   Procedure Laterality  Date     BACK SURGERY      mayte placement for scoliosis     COLONOSCOPY N/A 2021    Procedure: COLONOSCOPY, WITH POLYPECTOMY AND BIOPSY;  Surgeon: Delma Keane MD;  Location:  GI     GYN SURGERY  ,          Current Outpatient Medications   Medication Sig Dispense Refill     cholecalciferol 5000 UNITS CAPS Take 1 capsule by mouth daily. 90 capsule 0     norethindrone-ethinyl estradiol (BLISOVI FE ) 1-20 MG-MCG tablet Take 1 tablet by mouth daily 84 tablet 3       No Known Allergies     Social History     Tobacco Use     Smoking status: Never Smoker     Smokeless tobacco: Never Used   Substance Use Topics     Alcohol use: Yes     Comment: Occ     Family History   Problem Relation Age of Onset     Diabetes Mother      Endocrine Disease Mother      Cancer Father      Cerebrovascular Disease Maternal Grandmother      Diabetes Maternal Uncle      Cerebrovascular Disease Maternal Uncle      Colon Cancer Sister      Coronary Artery Disease No family hx of      Hypertension No family hx of      Hyperlipidemia No family hx of      Breast Cancer No family hx of      Prostate Cancer No family hx of      Other Cancer No family hx of      Depression No family hx of      Anxiety Disorder No family hx of      Mental Illness No family hx of      Substance Abuse No family hx of      Anesthesia Reaction No family hx of      Asthma No family hx of      Osteoporosis No family hx of      Genetic Disorder No family hx of      Thyroid Disease No family hx of      Obesity No family hx of      Unknown/Adopted No family hx of      History   Drug Use No         Objective     /68   Pulse 68   Temp 97.8  F (36.6  C) (Temporal)   Resp 16   Wt 42.2 kg (93 lb)   LMP 2022 (Exact Date)   SpO2 99%   BMI 18.78 kg/m      Physical Exam    GENERAL APPEARANCE: healthy, alert and no distress     EYES: EOMI, PERRL     HENT: ear canals and TM's normal and nose and mouth without ulcers or lesions     NECK: no  adenopathy, no asymmetry, masses, or scars and thyroid normal to palpation     RESP: lungs clear to auscultation - no rales, rhonchi or wheezes     CV: regular rates and rhythm, normal S1 S2, no S3 or S4 and no murmur, click or rub     ABDOMEN:  soft, nontender, no HSM or masses and bowel sounds normal     MS: extremities normal- no gross deformities noted, no evidence of inflammation in joints, FROM in all extremities.     SKIN: no suspicious lesions or rashes     NEURO: Normal strength and tone, sensory exam grossly normal, mentation intact and speech normal     PSYCH: mentation appears normal. and affect normal/bright     LYMPHATICS: No cervical adenopathy    Recent Labs   Lab Test 01/12/22  0804 11/17/21  1052   HGB 13.7 13.8    327    135   POTASSIUM 3.6 3.7   CR 0.60 0.61        Diagnostics:  Labs pending at this time.  Results will be reviewed when available.  Recent Results (from the past 720 hour(s))   CBC with platelets    Collection Time: 08/09/22 11:09 AM   Result Value Ref Range    WBC Count 5.7 4.0 - 11.0 10e3/uL    RBC Count 4.30 3.80 - 5.20 10e6/uL    Hemoglobin 13.2 11.7 - 15.7 g/dL    Hematocrit 41.7 35.0 - 47.0 %    MCV 97 78 - 100 fL    MCH 30.7 26.5 - 33.0 pg    MCHC 31.7 31.5 - 36.5 g/dL    RDW 13.5 10.0 - 15.0 %    Platelet Count 322 150 - 450 10e3/uL      EKG required for No EKG done anytime, abdominal surgery  and not completed in the last 90 days.   EKG: appears normal, NSR, normal axis, normal intervals, no acute ST/T changes c/w ischemia, no LVH by voltage criteria, unchanged from previous tracings    Revised Cardiac Risk Index (RCRI):  The patient has the following serious cardiovascular risks for perioperative complications:   - No serious cardiac risks = 0 points     RCRI Interpretation: 0 points: Class I (very low risk - 0.4% complication rate)           Signed Electronically by: Alexandra Mayorga MD  Copy of this evaluation report is provided to requesting physician.

## 2022-08-10 LAB
ALBUMIN SERPL-MCNC: 3.8 G/DL (ref 3.4–5)
ALP SERPL-CCNC: 25 U/L (ref 40–150)
ALT SERPL W P-5'-P-CCNC: 18 U/L (ref 0–50)
ANION GAP SERPL CALCULATED.3IONS-SCNC: 6 MMOL/L (ref 3–14)
AST SERPL W P-5'-P-CCNC: 19 U/L (ref 0–45)
BILIRUB SERPL-MCNC: 0.5 MG/DL (ref 0.2–1.3)
BUN SERPL-MCNC: 5 MG/DL (ref 7–30)
CALCIUM SERPL-MCNC: 9.1 MG/DL (ref 8.5–10.1)
CHLORIDE BLD-SCNC: 105 MMOL/L (ref 94–109)
CO2 SERPL-SCNC: 27 MMOL/L (ref 20–32)
CREAT SERPL-MCNC: 0.6 MG/DL (ref 0.52–1.04)
GFR SERPL CREATININE-BSD FRML MDRD: >90 ML/MIN/1.73M2
GLUCOSE BLD-MCNC: 75 MG/DL (ref 70–99)
POTASSIUM BLD-SCNC: 4.3 MMOL/L (ref 3.4–5.3)
PROT SERPL-MCNC: 7.6 G/DL (ref 6.8–8.8)
SODIUM SERPL-SCNC: 138 MMOL/L (ref 133–144)

## 2022-08-12 ENCOUNTER — LAB (OUTPATIENT)
Dept: LAB | Facility: CLINIC | Age: 47
End: 2022-08-12
Payer: COMMERCIAL

## 2022-08-12 DIAGNOSIS — Z11.52 ENCOUNTER FOR PREOPERATIVE SCREENING LABORATORY TESTING FOR COVID-19 VIRUS: ICD-10-CM

## 2022-08-12 DIAGNOSIS — Z01.812 ENCOUNTER FOR PREOPERATIVE SCREENING LABORATORY TESTING FOR COVID-19 VIRUS: ICD-10-CM

## 2022-08-12 PROCEDURE — U0003 INFECTIOUS AGENT DETECTION BY NUCLEIC ACID (DNA OR RNA); SEVERE ACUTE RESPIRATORY SYNDROME CORONAVIRUS 2 (SARS-COV-2) (CORONAVIRUS DISEASE [COVID-19]), AMPLIFIED PROBE TECHNIQUE, MAKING USE OF HIGH THROUGHPUT TECHNOLOGIES AS DESCRIBED BY CMS-2020-01-R: HCPCS

## 2022-08-12 PROCEDURE — U0005 INFEC AGEN DETEC AMPLI PROBE: HCPCS

## 2022-08-13 LAB — SARS-COV-2 RNA RESP QL NAA+PROBE: NEGATIVE

## 2022-08-17 ENCOUNTER — TELEPHONE (OUTPATIENT)
Dept: OBGYN | Facility: CLINIC | Age: 47
End: 2022-08-17

## 2022-09-07 ENCOUNTER — OFFICE VISIT (OUTPATIENT)
Dept: OBGYN | Facility: CLINIC | Age: 47
End: 2022-09-07
Payer: COMMERCIAL

## 2022-09-07 ENCOUNTER — PATIENT OUTREACH (OUTPATIENT)
Dept: ONCOLOGY | Facility: CLINIC | Age: 47
End: 2022-09-07

## 2022-09-07 VITALS
DIASTOLIC BLOOD PRESSURE: 60 MMHG | SYSTOLIC BLOOD PRESSURE: 102 MMHG | BODY MASS INDEX: 18.75 KG/M2 | WEIGHT: 93 LBS | HEART RATE: 64 BPM | HEIGHT: 59 IN

## 2022-09-07 DIAGNOSIS — N92.1 MENORRHAGIA WITH IRREGULAR CYCLE: ICD-10-CM

## 2022-09-07 DIAGNOSIS — N73.6 FEMALE PELVIC PERITONEAL ADHESIONS: Primary | ICD-10-CM

## 2022-09-07 DIAGNOSIS — D25.1 INTRAMURAL AND SUBMUCOUS LEIOMYOMA OF UTERUS: ICD-10-CM

## 2022-09-07 DIAGNOSIS — D25.0 INTRAMURAL AND SUBMUCOUS LEIOMYOMA OF UTERUS: ICD-10-CM

## 2022-09-07 PROCEDURE — 99024 POSTOP FOLLOW-UP VISIT: CPT | Performed by: OBSTETRICS & GYNECOLOGY

## 2022-09-07 NOTE — PROGRESS NOTES
SUBJECTIVE:                                                   Carlos Eng is a 46 year old female who presents to clinic today to follow up for laparoscopy. Please see my op note for complete details.    Briefly, she was scheduled for hysterectomy for fibroids and menorrhagia along with bulk symptoms. However, on laparoscopy, she had dense pelvic adhesions, and it was determined that our options would be either to open (not ideal as this was scheduled at the surgery center and not the hospital) or to abort further attempts at surgery and plan for consultation and robotic hysterectomy with GYN onc or with general surgery in attendance. She is here today for 2 week follow up.    Incisions doing well. Pain occasionally at the RLQ incision. Doing well overall.      Problem list and histories reviewed & adjusted, as indicated.  Additional history: as documented.    Patient Active Problem List   Diagnosis     Family history of diabetes mellitus     External hemorrhoids     Screening for diabetes mellitus     Hypercholesterolemia     Excessive or frequent menstruation since 10-16     Intramural leiomyoma of uterus     Family history of colon cancer     Sinus bradycardia     Past Surgical History:   Procedure Laterality Date     BACK SURGERY      mayte placement for scoliosis     COLONOSCOPY N/A 2021    Procedure: COLONOSCOPY, WITH POLYPECTOMY AND BIOPSY;  Surgeon: Delma Keane MD;  Location:  GI     GYN SURGERY  ,           Social History     Tobacco Use     Smoking status: Never Smoker     Smokeless tobacco: Never Used   Substance Use Topics     Alcohol use: Yes     Comment: Occ      Problem (# of Occurrences) Relation (Name,Age of Onset)    Cancer (1) Father    Cerebrovascular Disease (2) Maternal Grandmother, Maternal Uncle    Colon Cancer (1) Sister    Diabetes (2) Mother, Maternal Uncle    Endocrine Disease (1) Mother       Negative family history of: Coronary Artery Disease,  "Hypertension, Hyperlipidemia, Breast Cancer, Prostate Cancer, Other Cancer, Depression, Anxiety Disorder, Mental Illness, Substance Abuse, Anesthesia Reaction, Asthma, Osteoporosis, Genetic Disorder, Thyroid Disease, Obesity, Unknown/Adopted            cholecalciferol 5000 UNITS CAPS, Take 1 capsule by mouth daily.  norethindrone-ethinyl estradiol (BLISOVI FE 1/20) 1-20 MG-MCG tablet, Take 1 tablet by mouth daily    No current facility-administered medications on file prior to visit.    No Known Allergies    ROS:  Negative except as noted in HPI.    OBJECTIVE:     /60   Pulse 64   Ht 1.499 m (4' 11\")   Wt 42.2 kg (93 lb)   LMP 07/19/2022 (Exact Date)   Breastfeeding No   BMI 18.78 kg/m    Abdomen: soft, nontender. Incisions clean, dry, and intact, healing well.    In-Clinic Test Results:  No results found for this or any previous visit (from the past 24 hour(s)).    ASSESSMENT/PLAN:                                                        ICD-10-CM    1. Female pelvic peritoneal adhesions  N73.6    2. Intramural and submucous leiomyoma of uterus  D25.1     D25.0    3. Menorrhagia with irregular cycle  N92.1          Recommend referral to GYN Onc for consideration of robotic hysterectomy for fibroids and menorrhagia in the setting of pelvic adhesive disease. She agrees. Referral made.    Karol Kang MD  [facility] [place]  "

## 2022-09-07 NOTE — LETTER
[facility] [place]  [address]  [address]  [facility] [id number] [phone]          September 7, 2022    RE:  Carlos Eng                                                                                                                                                       [address] [facility] [address]            To whom it may concern:    Carlos Eng is under my professional care for    Female pelvic peritoneal adhesions  Intramural and submucous leiomyoma of uterus  Menorrhagia with irregular cycle She  may return to work with no restrictions on 9/13/2022.    Sincerely,        Karol Kang MD

## 2022-09-07 NOTE — NURSING NOTE
"Chief Complaint   Patient presents with     Surgical Followup     2wk post op.        Initial /60   Pulse 64   Ht 1.499 m (4' 11\")   Wt 42.2 kg (93 lb)   LMP 2022 (Exact Date)   BMI 18.78 kg/m   Estimated body mass index is 18.78 kg/m  as calculated from the following:    Height as of this encounter: 1.499 m (4' 11\").    Weight as of this encounter: 42.2 kg (93 lb).  BP completed using cuff size: regular    Questioned patient about current smoking habits.  Pt. has never smoked.          The following HM Due: NONE      The following patient reported/Care Every where data was sent to:  P ABSTRACT QUALITY INITIATIVES [[id number]]  Nilsa Young LPN               "

## 2022-09-07 NOTE — PROGRESS NOTES
New Patient Hematology / Oncology Nurse Navigator Note     Referral Date: 9/6/22    Referring provider: Dr Kang (OBGYN)    Referring Clinic/Organization: [facility]     Referred to: GynOnc    Requested provider (if applicable): Dr. Galvan    Evaluation for : needs hyst for fibroids with extensive pelvic adhesive disease     Clinical History (per Nurse review of records provided):      Office Visit today with OBGYN -- BOOKMARKED    9/16 PAP/HPV -- BOOKMARKED    1/21/22 Pelvic US -- BOOKMARKED    Clinical Assessment / Barriers to Care (Per Nurse):  Pt lives in [facility] Location: [facility] Needed:   N/A    Additional testing needed prior to consult:   N/A    Referral updates and Plan:   OUTGOING CALL to pt:  Introduced my role as nurse navigator with [facility] Hematology/Oncology dept and that we have recd the referral for surgical consult with Dr. Galvan from Dr Kang  Pt confirms they are aware of the referral and ready to schedule  Provided contact information if future questions arise.     -Transferred pt to NPS line [phone] to schedule appt per scheduling instructions.    Hold Dr. Galvan 9/13 11AM @ [facility]. She may want to schedule further out as this is the date she returns to work. Will follow-up as needed.    Mary Castanon, KIANAN, RN, PHN, OCN  Hematology/Oncology Nurse Navigator  [facility]  [phone]

## 2022-09-20 ENCOUNTER — ONCOLOGY VISIT (OUTPATIENT)
Dept: ONCOLOGY | Facility: CLINIC | Age: 47
End: 2022-09-20
Attending: OBSTETRICS & GYNECOLOGY
Payer: COMMERCIAL

## 2022-09-20 ENCOUNTER — PRE VISIT (OUTPATIENT)
Dept: ONCOLOGY | Facility: CLINIC | Age: 47
End: 2022-09-20

## 2022-09-20 VITALS
OXYGEN SATURATION: 100 % | HEART RATE: 79 BPM | HEIGHT: 58 IN | WEIGHT: 91.5 LBS | TEMPERATURE: 98.7 F | BODY MASS INDEX: 19.2 KG/M2 | SYSTOLIC BLOOD PRESSURE: 107 MMHG | DIASTOLIC BLOOD PRESSURE: 66 MMHG | RESPIRATION RATE: 16 BRPM

## 2022-09-20 DIAGNOSIS — D25.1 INTRAMURAL AND SUBMUCOUS LEIOMYOMA OF UTERUS: ICD-10-CM

## 2022-09-20 DIAGNOSIS — N92.1 MENORRHAGIA WITH IRREGULAR CYCLE: ICD-10-CM

## 2022-09-20 DIAGNOSIS — N73.6 FEMALE PELVIC PERITONEAL ADHESIONS: ICD-10-CM

## 2022-09-20 DIAGNOSIS — D25.0 INTRAMURAL AND SUBMUCOUS LEIOMYOMA OF UTERUS: ICD-10-CM

## 2022-09-20 PROCEDURE — 99205 OFFICE O/P NEW HI 60 MIN: CPT | Performed by: OBSTETRICS & GYNECOLOGY

## 2022-09-20 PROCEDURE — G0463 HOSPITAL OUTPT CLINIC VISIT: HCPCS

## 2022-09-20 ASSESSMENT — PAIN SCALES - GENERAL: PAINLEVEL: NO PAIN (0)

## 2022-09-20 NOTE — LETTER
2022         RE: Carlos Eng  [address] [place] [address]        Dear Colleague,    Thank you for referring your patient, Carlos Eng, to the [facility]. Please see a copy of my visit note below.    Consult Notes on Referred Patient        Dr. Karol Kang MD  [address] [place] [address]       RE: Carlos Eng  : 1975  KARENA: Sep 20, 2022    Dear Dr. Karol Kang:    I had the pleasure of seeing your patient Carlos Eng here at the Gynecologic Cancer Clinic at the [facility] on 2022.  As you know she is a very pleasant 46 year old woman with a diagnosis of symptomatic leiomyoma, pelvic adhesions.  Given these findings she was subsequently sent to the Gynecologic Cancer Clinic for new patient consultation.  She is unaccompanied today .    Patient presented to her gynecologist with symptoms of menorrhagia, dysmenorrhea and bulk symptoms related to her uterine leiomyoma. She attempted to manage it with OCP but wanted more definitive treatment.  She was scheduled for a laparoscopic hysterectomy at an outpatient center on , however she was found to have significant pelvic adhesions intra-operatively and decision was made to abort the procedure and she was referred here for discussion of surgical options.  She is currently still on OCP with her bleeding improved but still with dysmenorrhea likely secondary to endometriosis given her surgical findings.    22:  US pelvis (personally reviewed);  Multiple fibroids evident measuring up to 3.4 cm in the right fundus. The uterus is 10.8 x 8.6 x 6.2 cm. Endometrial stripe measures 2 mm and is normal for patient's age and menstrual status. The right ovary is not seen due to overlying bowel gas. The left ovary demonstrates a small simple cyst.  No left adnexal masses are present. No free pelvic fluid is present.    22:  Laparoscopy, lysis of adhesions with   Jorge Luis      Obstetrics and Gynecology History:  , c/s x 2  Has completed childbearing      Past Medical History:  History reviewed. No pertinent past medical history.    Past Surgical History:  Past Surgical History:   Procedure Laterality Date     BACK SURGERY      mayte placement for scoliosis     COLONOSCOPY N/A 2021    Procedure: COLONOSCOPY, WITH POLYPECTOMY AND BIOPSY;  Surgeon: Delma Keane MD;  Location:  GI     GYN SURGERY  ,            Health Maintenance:  Last Pap Smear: 20              Result: Negative, HPV negative  She has not had a history of abnormal Pap smears.    Last Mammogram: 22              Result: normal      She has not had a history of abnormal mammograms.    Last Colonoscopy: 21              Result: Polyp-repeat 5 years       Social History:  Lives with her , feels safe at home.  Works for [employer].  Does not have an advanced directive on file and would like her Darryl to be her POA.  Full code.    Family History:   The patient's family history is significant for.  Family History   Problem Relation Age of Onset     Diabetes Mother      Endocrine Disease Mother      Lung Cancer Father      Colon Cancer Sister      Cerebrovascular Disease Maternal Grandmother      Diabetes Maternal Uncle      Cerebrovascular Disease Maternal Uncle      Coronary Artery Disease No family hx of      Hypertension No family hx of      Hyperlipidemia No family hx of      Breast Cancer No family hx of      Prostate Cancer No family hx of      Other Cancer No family hx of      Depression No family hx of      Anxiety Disorder No family hx of      Mental Illness No family hx of      Substance Abuse No family hx of      Anesthesia Reaction No family hx of      Asthma No family hx of      Osteoporosis No family hx of      Genetic Disorder No family hx of      Thyroid Disease No family hx of      Obesity No family hx of      Unknown/Adopted No family hx  "of          Physical Exam:   /66   Pulse 79   Temp 98.7  F (37.1  C) (Tympanic)   Resp 16   Ht 1.473 m (4' 10\")   Wt 41.5 kg (91 lb 8 oz)   SpO2 100%   BMI 19.12 kg/m    Body mass index is 19.12 kg/m .  GENERAL: Healthy, alert and no distress  EYES: Eyes grossly normal to inspection.  No discharge or erythema, or obvious scleral/conjunctival abnormalities.  RESP: No audible wheeze, cough, or visible cyanosis.  No visible retractions or increased work of breathing.    SKIN: Visible skin clear. No significant rash, abnormal pigmentation or lesions.  NEURO: Cranial nerves grossly intact.  Mentation and speech appropriate for age.  PSYCH: Mentation appears normal, affect normal/bright, judgement and insight intact, normal speech and appearance well-groomed.     Labs:  None      Assessment:    Carlos Eng is a 46 year old woman with a diagnosis of symptomatic leiomyoma and pelvic adhesive disease.     A total of 60 minutes was spent on patient care today.       Plan:     1.)    Symptomatic leiomyoma and pelvic adhesive disease-We reviewed her recent surgical procedure and surgical photographs showing significant adhesive disease likely secondary to endometriosis.  We discussed that another attempt at a minimally invasive approach is reasonable at a hospital facility but that conversion to an open procedure is a high possibility.  We discussed the risks and recovery of an open procedure.  Given this, we discussed options of her to continue on her current medical management with OCP.  She is concerned about malignancy, but I reassured her that leiomyoma are not malignant and that they can increase in size until she reaches menopause but that they are still relatively small.  I also reassured her that her adhesive disease was likely not secondary to malignant process but endometriosis.  We also discussed that if she were to proceed with hysterectomy that I would recommend removal of both ovaries and " fallopian tubes as definitive management of her endometriosis.  She would like to consider these options further and at this time would like to continue on OCP.  She was given my contact information to call if she decides she would like to proceed with definitive surgical management     2.) Genetic risk factors were assessed and the patient does not meet the qualifications for a referral.      3.) Labs and/or tests ordered include:  None.     4.) Health maintenance issues addressed today include pt is up to date.        Thank you for allowing us to participate in the care of your patient.         Sincerely,    Charlotte Galvan MD  Gynecologic Oncology  [facility]       [facility]        Again, thank you for allowing me to participate in the care of your patient.        Sincerely,        Jerry Galvan MD

## 2022-09-20 NOTE — PROGRESS NOTES
Consult Notes on Referred Patient        Dr. Karol Kang MD  [address] [place] [address]       RE: Carlos Eng  : 1975  KARENA: Sep 20, 2022    Dear Dr. Karol Kang:    I had the pleasure of seeing your patient Carlos Eng here at the Gynecologic Cancer Clinic at the [facility] on 2022.  As you know she is a very pleasant 46 year old woman with a diagnosis of symptomatic leiomyoma, pelvic adhesions.  Given these findings she was subsequently sent to the Gynecologic Cancer Clinic for new patient consultation.  She is unaccompanied today .    Patient presented to her gynecologist with symptoms of menorrhagia, dysmenorrhea and bulk symptoms related to her uterine leiomyoma. She attempted to manage it with OCP but wanted more definitive treatment.  She was scheduled for a laparoscopic hysterectomy at an outpatient center on , however she was found to have significant pelvic adhesions intra-operatively and decision was made to abort the procedure and she was referred here for discussion of surgical options.  She is currently still on OCP with her bleeding improved but still with dysmenorrhea likely secondary to endometriosis given her surgical findings.    22:  US pelvis (personally reviewed);  Multiple fibroids evident measuring up to 3.4 cm in the right fundus. The uterus is 10.8 x 8.6 x 6.2 cm. Endometrial stripe measures 2 mm and is normal for patient's age and menstrual status. The right ovary is not seen due to overlying bowel gas. The left ovary demonstrates a small simple cyst.  No left adnexal masses are present. No free pelvic fluid is present.    22:  Laparoscopy, lysis of adhesions with Dr Kang      Obstetrics and Gynecology History:  , c/s x 2  Has completed childbearing      Past Medical History:  History reviewed. No pertinent past medical history.    Past Surgical History:  Past Surgical History:   Procedure Laterality Date      "BACK SURGERY      mayte placement for scoliosis     COLONOSCOPY N/A 2021    Procedure: COLONOSCOPY, WITH POLYPECTOMY AND BIOPSY;  Surgeon: Delma Keane MD;  Location:  GI     GYN SURGERY  ,            Health Maintenance:  Last Pap Smear: 20              Result: Negative, HPV negative  She has not had a history of abnormal Pap smears.    Last Mammogram: 22              Result: normal      She has not had a history of abnormal mammograms.    Last Colonoscopy: 21              Result: Polyp-repeat 5 years       Social History:  Lives with her , feels safe at home.  Works for [employer].  Does not have an advanced directive on file and would like her Darryl to be her POA.  Full code.    Family History:   The patient's family history is significant for.  Family History   Problem Relation Age of Onset     Diabetes Mother      Endocrine Disease Mother      Lung Cancer Father      Colon Cancer Sister      Cerebrovascular Disease Maternal Grandmother      Diabetes Maternal Uncle      Cerebrovascular Disease Maternal Uncle      Coronary Artery Disease No family hx of      Hypertension No family hx of      Hyperlipidemia No family hx of      Breast Cancer No family hx of      Prostate Cancer No family hx of      Other Cancer No family hx of      Depression No family hx of      Anxiety Disorder No family hx of      Mental Illness No family hx of      Substance Abuse No family hx of      Anesthesia Reaction No family hx of      Asthma No family hx of      Osteoporosis No family hx of      Genetic Disorder No family hx of      Thyroid Disease No family hx of      Obesity No family hx of      Unknown/Adopted No family hx of          Physical Exam:   /66   Pulse 79   Temp 98.7  F (37.1  C) (Tympanic)   Resp 16   Ht 1.473 m (4' 10\")   Wt 41.5 kg (91 lb 8 oz)   SpO2 100%   BMI 19.12 kg/m    Body mass index is 19.12 kg/m .  GENERAL: Healthy, alert and no " distress  EYES: Eyes grossly normal to inspection.  No discharge or erythema, or obvious scleral/conjunctival abnormalities.  RESP: No audible wheeze, cough, or visible cyanosis.  No visible retractions or increased work of breathing.    SKIN: Visible skin clear. No significant rash, abnormal pigmentation or lesions.  NEURO: Cranial nerves grossly intact.  Mentation and speech appropriate for age.  PSYCH: Mentation appears normal, affect normal/bright, judgement and insight intact, normal speech and appearance well-groomed.     Labs:  None      Assessment:    Carlos Eng is a 46 year old woman with a diagnosis of symptomatic leiomyoma and pelvic adhesive disease.     A total of 60 minutes was spent on patient care today.       Plan:     1.)    Symptomatic leiomyoma and pelvic adhesive disease-We reviewed her recent surgical procedure and surgical photographs showing significant adhesive disease likely secondary to endometriosis.  We discussed that another attempt at a minimally invasive approach is reasonable at a hospital facility but that conversion to an open procedure is a high possibility.  We discussed the risks and recovery of an open procedure.  Given this, we discussed options of her to continue on her current medical management with OCP.  She is concerned about malignancy, but I reassured her that leiomyoma are not malignant and that they can increase in size until she reaches menopause but that they are still relatively small.  I also reassured her that her adhesive disease was likely not secondary to malignant process but endometriosis.  We also discussed that if she were to proceed with hysterectomy that I would recommend removal of both ovaries and fallopian tubes as definitive management of her endometriosis.  She would like to consider these options further and at this time would like to continue on OCP.  She was given my contact information to call if she decides she would like to proceed with  definitive surgical management     2.) Genetic risk factors were assessed and the patient does not meet the qualifications for a referral.      3.) Labs and/or tests ordered include:  None.     4.) Health maintenance issues addressed today include pt is up to date.        Thank you for allowing us to participate in the care of your patient.         Sincerely,    Charlotte Galvan MD  Gynecologic Oncology  [facility]       [facility]

## 2022-10-15 ENCOUNTER — HEALTH MAINTENANCE LETTER (OUTPATIENT)
Age: 47
End: 2022-10-15

## 2022-11-28 ENCOUNTER — PATIENT OUTREACH (OUTPATIENT)
Dept: ONCOLOGY | Facility: CLINIC | Age: 47
End: 2022-11-28

## 2022-11-28 NOTE — PROGRESS NOTES
[facility]: Cancer Care Short Note                                    Discussion with Patient:                                                      Patient calling in to report she would like to talk with Dr. Galvan and RNCC Maria Esther MOURA to discuss proceeding with definitive surgical management of her disease. Patient currently on OCP and hysterectomy was talked about at last visit with Dr. Galvan. Writer will update Dr. Galvan and Maria Esther MOURA RNCC of patient request.     Intervention/Education provided during outreach:                                                       Writer to route to provider for further recommendations regarding scheduling procedure.     Route to provider to further advise    Niranjan Cordova, RN, BSN.  RN Care Coordinator     [facility]   [phone]

## 2022-11-29 NOTE — PROGRESS NOTES
Mandy Edwards, Charlotte Palmer MD  You; Niranjan Cordova, RN;  Cancer Clinic Rn Pool 18 hours ago (3:51 PM)     CR  Can we please schedule her for a visit with me next Tuesday to discuss surgery and get it scheduled. Thanks     Follow up call to pt to review as noted per Dr Galvan.  Left message to call back and will offer appt with MD on 12/6/2022 at 8 am.    Maria Esther Navarro, RN, BSN, OCN

## 2022-12-05 NOTE — PROGRESS NOTES
Consult Notes on Referred Patient        Dr. Karol Kang MD  [address] [place] [address]       RE: Carlos Eng  : 1975  KARENA: Dec 6, 2022    HPI:  Carlos Eng is 47 year old with endometriosis and symptomatic leiomyoma.  She is unaccompanied today.  She has decided she would like to proceed with surgical treatment as she is continuing to have cyclic pain and bulk symptoms and has decided she would like definitive treatment.    22:  US pelvis:  Multiple fibroids evident measuring up to 3.4 cm in the right fundus. The uterus is 10.8 x 8.6 x 6.2 cm. Endometrial stripe measures 2 mm and is normal for patient's age and menstrual status. The right ovary is not seen due to overlying bowel gas. The left ovary demonstrates a small simple cyst.  No left adnexal masses are present. No free pelvic fluid is present.    22:  Laparoscopy, lysis of adhesions with Dr Kang      Obstetrics and Gynecology History:  , c/s x 2  Has completed childbearing      Past Medical History:  History reviewed. No pertinent past medical history.    Past Surgical History:  Past Surgical History:   Procedure Laterality Date     BACK SURGERY      mayte placement for scoliosis     COLONOSCOPY N/A 2021    Procedure: COLONOSCOPY, WITH POLYPECTOMY AND BIOPSY;  Surgeon: Delma Keane MD;  Location:  GI     GYN SURGERY  ,            Health Maintenance:  Last Pap Smear: 20              Result: Negative, HPV negative  She has not had a history of abnormal Pap smears.    Last Mammogram: 22              Result: normal      She has not had a history of abnormal mammograms.    Last Colonoscopy: 21              Result: Polyp-repeat 5 years       Social History:  Lives with her , feels safe at home.  Works for [employer].  Does not have an advanced directive on file and would like her , Darryl to be her POA.  Full code.    Family History:   The patient's  "family history is significant for.  Family History   Problem Relation Age of Onset     Diabetes Mother      Endocrine Disease Mother      Lung Cancer Father      Colon Cancer Sister      Cerebrovascular Disease Maternal Grandmother      Diabetes Maternal Uncle      Cerebrovascular Disease Maternal Uncle      Coronary Artery Disease No family hx of      Hypertension No family hx of      Hyperlipidemia No family hx of      Breast Cancer No family hx of      Prostate Cancer No family hx of      Other Cancer No family hx of      Depression No family hx of      Anxiety Disorder No family hx of      Mental Illness No family hx of      Substance Abuse No family hx of      Anesthesia Reaction No family hx of      Asthma No family hx of      Osteoporosis No family hx of      Genetic Disorder No family hx of      Thyroid Disease No family hx of      Obesity No family hx of      Unknown/Adopted No family hx of          Physical Exam:   /70   Pulse 66   Temp 99.1  F (37.3  C) (Tympanic)   Ht 1.473 m (4' 10\")   Wt 43.5 kg (95 lb 14.4 oz)   SpO2 100%   BMI 20.04 kg/m    Body mass index is 20.04 kg/m .  GENERAL: Healthy, alert and no distress  EYES: Eyes grossly normal to inspection.  No discharge or erythema, or obvious scleral/conjunctival abnormalities.  RESP: CTAB  Cardiovascular: RRR, no murmurs    SKIN: Visible skin clear. No significant rash, abnormal pigmentation or lesions.  NEURO: Cranial nerves grossly intact.  Mentation and speech appropriate for age.  PSYCH: Mentation appears normal, affect normal/bright, judgement and insight intact, normal speech and appearance well-groomed.     Labs:  None      Assessment:    Carlos Eng is a 47 year old woman with a diagnosis of symptomatic leiomyoma and pelvic adhesive disease.     A total of 30 minutes was spent on patient care today.       Plan:     1.)    Symptomatic leiomyoma and pelvic adhesive disease-We reviewed her recent surgical procedure and surgical " photographs showing significant adhesive disease likely secondary to endometriosis.  We discussed that another attempt at a minimally invasive approach is reasonable at a hospital facility but that conversion to an open procedure is a possibility.  We discussed the risks and recovery of an open procedure.  She has decided to proceed with definitive therapy.  We again reviewed my recommendation for bilateral oophorectomy given her endometriosis.  She wishes to wait until January for surgery.  Risks, benefits, indications and alternatives were reviewed.  All her questions were answered and she will undergo robotic removal of uterus, cervix, both ovaries and fallopian tubes, cystoscopy, possible open on 1/19 at [facility].       2.) Genetic risk factors were assessed and the patient does not meet the qualifications for a referral.      3.) Labs and/or tests ordered include:  None.     4.) Health maintenance issues addressed today include pt is up to date.    5.) Pre-operative teaching was completed today.        Thank you for allowing us to participate in the care of your patient.         Sincerely,    Charlotte Galvan MD  Gynecologic Oncology  [facility]       [place] [place]

## 2022-12-06 ENCOUNTER — ONCOLOGY VISIT (OUTPATIENT)
Dept: ONCOLOGY | Facility: CLINIC | Age: 47
End: 2022-12-06
Attending: OBSTETRICS & GYNECOLOGY
Payer: COMMERCIAL

## 2022-12-06 VITALS
WEIGHT: 95.9 LBS | HEIGHT: 58 IN | BODY MASS INDEX: 20.13 KG/M2 | DIASTOLIC BLOOD PRESSURE: 70 MMHG | HEART RATE: 66 BPM | OXYGEN SATURATION: 100 % | SYSTOLIC BLOOD PRESSURE: 107 MMHG | TEMPERATURE: 99.1 F

## 2022-12-06 DIAGNOSIS — D25.1 INTRAMURAL LEIOMYOMA OF UTERUS: Primary | ICD-10-CM

## 2022-12-06 DIAGNOSIS — N80.9 ENDOMETRIOSIS: ICD-10-CM

## 2022-12-06 PROCEDURE — G0463 HOSPITAL OUTPT CLINIC VISIT: HCPCS

## 2022-12-06 PROCEDURE — 99214 OFFICE O/P EST MOD 30 MIN: CPT | Performed by: OBSTETRICS & GYNECOLOGY

## 2022-12-06 PROCEDURE — 99212 OFFICE O/P EST SF 10 MIN: CPT | Performed by: OBSTETRICS & GYNECOLOGY

## 2022-12-06 RX ORDER — METRONIDAZOLE 500 MG/100ML
500 INJECTION, SOLUTION INTRAVENOUS
Status: CANCELLED | OUTPATIENT
Start: 2022-12-06

## 2022-12-06 RX ORDER — HEPARIN SODIUM 10000 [USP'U]/ML
5000 INJECTION, SOLUTION INTRAVENOUS; SUBCUTANEOUS
Status: CANCELLED | OUTPATIENT
Start: 2022-12-06

## 2022-12-06 RX ORDER — PHENAZOPYRIDINE HYDROCHLORIDE 100 MG/1
200 TABLET, FILM COATED ORAL ONCE
Status: CANCELLED | OUTPATIENT
Start: 2022-12-06 | End: 2022-12-06

## 2022-12-06 ASSESSMENT — PAIN SCALES - GENERAL: PAINLEVEL: NO PAIN (0)

## 2022-12-06 NOTE — PATIENT INSTRUCTIONS
You have been scheduled for surgery on: 1/19 at [facility]    Diagnosis:  Endometriosis, leiomyoma    The surgical procedure is:  robotic removal of uterus, cervix, both ovaries and fallopian tubes, cystoscopy, possible open    Anticipated length of surgery: 2-3 hrs.  You will be in the recovery room for approximately 2-3 hrs after surgery.  Your family will not be able to see you until after you leave the recovery room.    Length of hospital stay:  This is an outpatient, extended stay surgery.  You will be discharged same day if you meet criteria for discharge.  If you have a larger surgical incision, you will need to be in the hospital 2-3 days.  ______________________________________________________________________    Preparation for Surgery:    To Schedule   1.  Post-operative exam with me 1-2 weeks following surgery      Postoperative Restrictions:  No heavy lifting >20lbs for six weeks, nothing in the vagina (no tampons, no intercourse, no douching) for eight weeks.     Postoperative visit:  Return to clinic 1-2 weeks after surgery for post operative visit.      Please contact the clinic with any questions or concerns.    Charlotte Galvan MD  Gynecologic Oncology  [facility]

## 2022-12-06 NOTE — LETTER
2022         RE: Carlos Eng  [address]  [place] [address]        Dear Colleague,    Thank you for referring your patient, Carlos Eng, to the [facility]. Please see a copy of my visit note below.    Consult Notes on Referred Patient        Dr. Karol Kang MD  [address] [place] [address]       RE: Carlos Eng  : 1975  KARENA: Dec 6, 2022    HPI:  Carlos Eng is 47 year old with endometriosis and symptomatic leiomyoma.  She is unaccompanied today.  She has decided she would like to proceed with surgical treatment as she is continuing to have cyclic pain and bulk symptoms and has decided she would like definitive treatment.    22:  US pelvis:  Multiple fibroids evident measuring up to 3.4 cm in the right fundus. The uterus is 10.8 x 8.6 x 6.2 cm. Endometrial stripe measures 2 mm and is normal for patient's age and menstrual status. The right ovary is not seen due to overlying bowel gas. The left ovary demonstrates a small simple cyst.  No left adnexal masses are present. No free pelvic fluid is present.    22:  Laparoscopy, lysis of adhesions with Dr Kang      Obstetrics and Gynecology History:  , c/s x 2  Has completed childbearing      Past Medical History:  History reviewed. No pertinent past medical history.    Past Surgical History:  Past Surgical History:   Procedure Laterality Date     BACK SURGERY      mayte placement for scoliosis     COLONOSCOPY N/A 2021    Procedure: COLONOSCOPY, WITH POLYPECTOMY AND BIOPSY;  Surgeon: Delma Keane MD;  Location:  GI     GYN SURGERY  ,            Health Maintenance:  Last Pap Smear: 20              Result: Negative, HPV negative  She has not had a history of abnormal Pap smears.    Last Mammogram: 22              Result: normal      She has not had a history of abnormal mammograms.    Last Colonoscopy: 21              Result:  "Polyp-repeat 5 years       Social History:  Lives with her , feels safe at home.  Works for [employer].  Does not have an advanced directive on file and would like her , Darryl to be her POA.  Full code.    Family History:   The patient's family history is significant for.  Family History   Problem Relation Age of Onset     Diabetes Mother      Endocrine Disease Mother      Lung Cancer Father      Colon Cancer Sister      Cerebrovascular Disease Maternal Grandmother      Diabetes Maternal Uncle      Cerebrovascular Disease Maternal Uncle      Coronary Artery Disease No family hx of      Hypertension No family hx of      Hyperlipidemia No family hx of      Breast Cancer No family hx of      Prostate Cancer No family hx of      Other Cancer No family hx of      Depression No family hx of      Anxiety Disorder No family hx of      Mental Illness No family hx of      Substance Abuse No family hx of      Anesthesia Reaction No family hx of      Asthma No family hx of      Osteoporosis No family hx of      Genetic Disorder No family hx of      Thyroid Disease No family hx of      Obesity No family hx of      Unknown/Adopted No family hx of          Physical Exam:   /70   Pulse 66   Temp 99.1  F (37.3  C) (Tympanic)   Ht 1.473 m (4' 10\")   Wt 43.5 kg (95 lb 14.4 oz)   SpO2 100%   BMI 20.04 kg/m    Body mass index is 20.04 kg/m .  GENERAL: Healthy, alert and no distress  EYES: Eyes grossly normal to inspection.  No discharge or erythema, or obvious scleral/conjunctival abnormalities.  RESP: CTAB  Cardiovascular: RRR, no murmurs    SKIN: Visible skin clear. No significant rash, abnormal pigmentation or lesions.  NEURO: Cranial nerves grossly intact.  Mentation and speech appropriate for age.  PSYCH: Mentation appears normal, affect normal/bright, judgement and insight intact, normal speech and appearance well-groomed.     Labs:  None      Assessment:    Carlos Eng is a 47 year old woman " with a diagnosis of symptomatic leiomyoma and pelvic adhesive disease.     A total of 30 minutes was spent on patient care today.       Plan:     1.)    Symptomatic leiomyoma and pelvic adhesive disease-We reviewed her recent surgical procedure and surgical photographs showing significant adhesive disease likely secondary to endometriosis.  We discussed that another attempt at a minimally invasive approach is reasonable at a hospital facility but that conversion to an open procedure is a possibility.  We discussed the risks and recovery of an open procedure.  She has decided to proceed with definitive therapy.  We again reviewed my recommendation for bilateral oophorectomy given her endometriosis.  She wishes to wait until January for surgery.  Risks, benefits, indications and alternatives were reviewed.  All her questions were answered and she will undergo robotic removal of uterus, cervix, both ovaries and fallopian tubes, cystoscopy, possible open on 1/19 at [facility].       2.) Genetic risk factors were assessed and the patient does not meet the qualifications for a referral.      3.) Labs and/or tests ordered include:  None.     4.) Health maintenance issues addressed today include pt is up to date.    5.) Pre-operative teaching was completed today.        Thank you for allowing us to participate in the care of your patient.         Sincerely,    Charlotte Galvan MD  Gynecologic Oncology  [facility]       [place] [place]        Again, thank you for allowing me to participate in the care of your patient.        Sincerely,        Jerry Galvan MD

## 2022-12-06 NOTE — NURSING NOTE
"Oncology Rooming Note    December 6, 2022 3:06 PM   Carlos Eng is a 47 year old female who presents for:    Chief Complaint   Patient presents with     Oncology Clinic Visit     Intramural leiomyoma of uterus       Initial Vitals: /70   Pulse 66   Temp 99.1  F (37.3  C) (Tympanic)   Ht 1.473 m (4' 10\")   Wt 43.5 kg (95 lb 14.4 oz)   SpO2 100%   BMI 20.04 kg/m   Estimated body mass index is 20.04 kg/m  as calculated from the following:    Height as of this encounter: 1.473 m (4' 10\").    Weight as of this encounter: 43.5 kg (95 lb 14.4 oz). Body surface area is 1.33 meters squared.  No Pain (0) Comment: Data Unavailable   No LMP recorded. (Menstrual status: Irregular Periods).  Allergies reviewed: Yes  Medications reviewed: Yes    Medications: Medication refills not needed today.  Pharmacy name entered into [facility]: [id number] [address]    Clinical concerns: follow up       Bhargavi Randhawa CMA              "

## 2022-12-12 ENCOUNTER — TELEPHONE (OUTPATIENT)
Dept: ONCOLOGY | Facility: CLINIC | Age: 47
End: 2022-12-12

## 2022-12-12 NOTE — TELEPHONE ENCOUNTER
----- Message from Charlotte Edwards MD sent at 12/6/2022  3:30 PM CST -----  Brooke Kinsey needs robot TLH, BSO on 1/19.  Orders are in.  Will need 2 hours

## 2022-12-12 NOTE — TELEPHONE ENCOUNTER
RN Care Coordinator: Maria Esther Navarro; [phone]    Surgery is scheduled with Dr. Galvan   Date: 1/19   Location: [facility]  Scheduled per: surgeon requested date      H&P to be completed by Surgeon     Post-op: will be scheduled by the clinic    Patient will receive a phone call from pre-admission nurses 1-2 days prior to surgery with arrival and start time.      Left a detailed voicemail regarding the scheduled information and requested a call back to discuss. Provided direct line.      Patient questions/concerns: N/A       Surgery packet was provided by the RNCC during appointment    __    Brooke Hernandez, Senior Perioperative Coordinator, on 12/12/2022 on 10:26 AM  P: [phone]

## 2022-12-22 DIAGNOSIS — N92.0 EXCESSIVE OR FREQUENT MENSTRUATION: ICD-10-CM

## 2022-12-22 DIAGNOSIS — Z00.00 ROUTINE GENERAL MEDICAL EXAMINATION AT A HEALTH CARE FACILITY: ICD-10-CM

## 2022-12-22 RX ORDER — NORETHINDRONE ACETATE AND ETHINYL ESTRADIOL 1MG-20(21)
KIT ORAL
Qty: 84 TABLET | Refills: 0 | Status: SHIPPED | OUTPATIENT
Start: 2022-12-22 | End: 2023-03-21

## 2022-12-23 ENCOUNTER — PATIENT OUTREACH (OUTPATIENT)
Dept: ONCOLOGY | Facility: CLINIC | Age: 47
End: 2022-12-23

## 2022-12-23 NOTE — PROGRESS NOTES
Attempted to reach patient without success to discuss upcoming surgery  Shaunna Sam RN on 12/23/2022 at 10:03 AM

## 2022-12-28 NOTE — PROGRESS NOTES
Return call from patient and informed that her 's FMLA is completed for patient's upcoming surgery on 1/19/23. Patients states she will  in the clinic today and requests her surgery education is completed closer to the date of surgery.  Patient informed that writer will call her about 7-10 days prior to surgery, patient aware of plan.    Maria Esther Navarro RN, BSN, OCN

## 2023-01-13 ENCOUNTER — PATIENT OUTREACH (OUTPATIENT)
Dept: ONCOLOGY | Facility: CLINIC | Age: 48
End: 2023-01-13

## 2023-01-13 NOTE — PROGRESS NOTES
Received call from patient about surgery instructions. Patient is scheduled for procedure with Dr Galvan on 1/19/23 and has folder and soap at home. Provided with tentative arrival on day of surgery.    Reviewed medications that must be held for at least 7 days prior to surgery (Aspirin, Ibuprofen, Naproxen, Fish oil/Flax seed oil, Multivitamin/Vit. E, or any other product containing aspirin, or NSAIDs) and patient will hold medications as listed above starting today.    Writer will call patient back on 1/18/23 at 11 am to complete surgical education and patient aware of plan. No further questions at this time.    Maria Esther Navarro, RN, BSN, OCN

## 2023-01-18 ENCOUNTER — ANESTHESIA EVENT (OUTPATIENT)
Dept: SURGERY | Facility: CLINIC | Age: 48
End: 2023-01-18
Payer: COMMERCIAL

## 2023-01-18 ENCOUNTER — PATIENT OUTREACH (OUTPATIENT)
Dept: ONCOLOGY | Facility: CLINIC | Age: 48
End: 2023-01-18
Payer: COMMERCIAL

## 2023-01-19 ENCOUNTER — HOSPITAL ENCOUNTER (OUTPATIENT)
Facility: CLINIC | Age: 48
Discharge: HOME | End: 2023-01-19
Attending: OBSTETRICS & GYNECOLOGY | Admitting: OBSTETRICS & GYNECOLOGY
Payer: COMMERCIAL

## 2023-01-19 ENCOUNTER — ANESTHESIA (OUTPATIENT)
Dept: SURGERY | Facility: CLINIC | Age: 48
End: 2023-01-19
Payer: COMMERCIAL

## 2023-01-19 VITALS
TEMPERATURE: 97.3 F | DIASTOLIC BLOOD PRESSURE: 60 MMHG | BODY MASS INDEX: 19.11 KG/M2 | HEART RATE: 70 BPM | RESPIRATION RATE: 16 BRPM | OXYGEN SATURATION: 100 % | HEIGHT: 59 IN | WEIGHT: 94.8 LBS | SYSTOLIC BLOOD PRESSURE: 100 MMHG

## 2023-01-19 DIAGNOSIS — Z90.710 S/P HYSTERECTOMY: Primary | ICD-10-CM

## 2023-01-19 DIAGNOSIS — D25.1 INTRAMURAL LEIOMYOMA OF UTERUS: ICD-10-CM

## 2023-01-19 DIAGNOSIS — N80.9 ENDOMETRIOSIS: ICD-10-CM

## 2023-01-19 LAB
ABO/RH(D): NORMAL
ALBUMIN SERPL BCG-MCNC: 4 G/DL (ref 3.5–5.2)
ALP SERPL-CCNC: 31 U/L (ref 35–104)
ALT SERPL W P-5'-P-CCNC: 13 U/L (ref 10–35)
ANION GAP SERPL CALCULATED.3IONS-SCNC: 12 MMOL/L (ref 7–15)
ANTIBODY SCREEN: NEGATIVE
AST SERPL W P-5'-P-CCNC: 20 U/L (ref 10–35)
BASOPHILS # BLD AUTO: 0 10E3/UL (ref 0–0.2)
BASOPHILS NFR BLD AUTO: 1 %
BILIRUB SERPL-MCNC: 0.4 MG/DL
BUN SERPL-MCNC: 8.6 MG/DL (ref 6–20)
CALCIUM SERPL-MCNC: 9.3 MG/DL (ref 8.6–10)
CHLORIDE SERPL-SCNC: 102 MMOL/L (ref 98–107)
CREAT SERPL-MCNC: 0.7 MG/DL (ref 0.51–0.95)
DEPRECATED HCO3 PLAS-SCNC: 24 MMOL/L (ref 22–29)
EOSINOPHIL # BLD AUTO: 0 10E3/UL (ref 0–0.7)
EOSINOPHIL NFR BLD AUTO: 1 %
ERYTHROCYTE [DISTWIDTH] IN BLOOD BY AUTOMATED COUNT: 12.8 % (ref 10–15)
GFR SERPL CREATININE-BSD FRML MDRD: >90 ML/MIN/1.73M2
GLUCOSE BLDC GLUCOMTR-MCNC: 82 MG/DL (ref 70–99)
GLUCOSE SERPL-MCNC: 81 MG/DL (ref 70–99)
HCG SERPL QL: NEGATIVE
HCT VFR BLD AUTO: 42 % (ref 35–47)
HGB BLD-MCNC: 13.2 G/DL (ref 11.7–15.7)
IMM GRANULOCYTES # BLD: 0 10E3/UL
IMM GRANULOCYTES NFR BLD: 0 %
LYMPHOCYTES # BLD AUTO: 1.6 10E3/UL (ref 0.8–5.3)
LYMPHOCYTES NFR BLD AUTO: 28 %
MCH RBC QN AUTO: 31.3 PG (ref 26.5–33)
MCHC RBC AUTO-ENTMCNC: 31.4 G/DL (ref 31.5–36.5)
MCV RBC AUTO: 100 FL (ref 78–100)
MONOCYTES # BLD AUTO: 0.4 10E3/UL (ref 0–1.3)
MONOCYTES NFR BLD AUTO: 8 %
NEUTROPHILS # BLD AUTO: 3.7 10E3/UL (ref 1.6–8.3)
NEUTROPHILS NFR BLD AUTO: 62 %
NRBC # BLD AUTO: 0 10E3/UL
NRBC BLD AUTO-RTO: 0 /100
PLATELET # BLD AUTO: 340 10E3/UL (ref 150–450)
POTASSIUM SERPL-SCNC: 4.2 MMOL/L (ref 3.4–5.3)
PROT SERPL-MCNC: 7.5 G/DL (ref 6.4–8.3)
RBC # BLD AUTO: 4.22 10E6/UL (ref 3.8–5.2)
SARS-COV-2 RNA RESP QL NAA+PROBE: NEGATIVE
SODIUM SERPL-SCNC: 138 MMOL/L (ref 136–145)
SPECIMEN EXPIRATION DATE: NORMAL
WBC # BLD AUTO: 5.9 10E3/UL (ref 4–11)

## 2023-01-19 PROCEDURE — 88309 TISSUE EXAM BY PATHOLOGIST: CPT | Mod: 26 | Performed by: PATHOLOGY

## 2023-01-19 PROCEDURE — 360N000080 HC SURGERY LEVEL 7, PER MIN: Performed by: OBSTETRICS & GYNECOLOGY

## 2023-01-19 PROCEDURE — 272N000001 HC OR GENERAL SUPPLY STERILE: Performed by: OBSTETRICS & GYNECOLOGY

## 2023-01-19 PROCEDURE — 250N000025 HC SEVOFLURANE, PER MIN: Performed by: OBSTETRICS & GYNECOLOGY

## 2023-01-19 PROCEDURE — 88112 CYTOPATH CELL ENHANCE TECH: CPT | Mod: 26 | Performed by: PATHOLOGY

## 2023-01-19 PROCEDURE — 99207 PR BUNDLED PROCEDURE IN GLOBAL PKG STATISTIC: CPT | Performed by: OBSTETRICS & GYNECOLOGY

## 2023-01-19 PROCEDURE — 86901 BLOOD TYPING SEROLOGIC RH(D): CPT | Performed by: OBSTETRICS & GYNECOLOGY

## 2023-01-19 PROCEDURE — 84703 CHORIONIC GONADOTROPIN ASSAY: CPT | Performed by: OBSTETRICS & GYNECOLOGY

## 2023-01-19 PROCEDURE — 258N000003 HC RX IP 258 OP 636: Performed by: NURSE ANESTHETIST, CERTIFIED REGISTERED

## 2023-01-19 PROCEDURE — U0005 INFEC AGEN DETEC AMPLI PROBE: HCPCS | Performed by: OBSTETRICS & GYNECOLOGY

## 2023-01-19 PROCEDURE — 250N000011 HC RX IP 250 OP 636: Performed by: OBSTETRICS & GYNECOLOGY

## 2023-01-19 PROCEDURE — 710N000012 HC RECOVERY PHASE 2, PER MINUTE: Performed by: OBSTETRICS & GYNECOLOGY

## 2023-01-19 PROCEDURE — 88112 CYTOPATH CELL ENHANCE TECH: CPT | Mod: TC | Performed by: OBSTETRICS & GYNECOLOGY

## 2023-01-19 PROCEDURE — 250N000009 HC RX 250: Performed by: NURSE ANESTHETIST, CERTIFIED REGISTERED

## 2023-01-19 PROCEDURE — 710N000010 HC RECOVERY PHASE 1, LEVEL 2, PER MIN: Performed by: OBSTETRICS & GYNECOLOGY

## 2023-01-19 PROCEDURE — 85025 COMPLETE CBC W/AUTO DIFF WBC: CPT | Performed by: OBSTETRICS & GYNECOLOGY

## 2023-01-19 PROCEDURE — 250N000011 HC RX IP 250 OP 636: Performed by: ANESTHESIOLOGY

## 2023-01-19 PROCEDURE — 250N000013 HC RX MED GY IP 250 OP 250 PS 637: Performed by: OBSTETRICS & GYNECOLOGY

## 2023-01-19 PROCEDURE — 88309 TISSUE EXAM BY PATHOLOGIST: CPT | Mod: TC | Performed by: OBSTETRICS & GYNECOLOGY

## 2023-01-19 PROCEDURE — 999N000141 HC STATISTIC PRE-PROCEDURE NURSING ASSESSMENT: Performed by: OBSTETRICS & GYNECOLOGY

## 2023-01-19 PROCEDURE — 80053 COMPREHEN METABOLIC PANEL: CPT | Performed by: OBSTETRICS & GYNECOLOGY

## 2023-01-19 PROCEDURE — 36415 COLL VENOUS BLD VENIPUNCTURE: CPT | Performed by: OBSTETRICS & GYNECOLOGY

## 2023-01-19 PROCEDURE — 250N000011 HC RX IP 250 OP 636: Performed by: NURSE ANESTHETIST, CERTIFIED REGISTERED

## 2023-01-19 PROCEDURE — 370N000017 HC ANESTHESIA TECHNICAL FEE, PER MIN: Performed by: OBSTETRICS & GYNECOLOGY

## 2023-01-19 RX ORDER — ONDANSETRON 4 MG/1
4 TABLET, ORALLY DISINTEGRATING ORAL EVERY 30 MIN PRN
Status: DISCONTINUED | OUTPATIENT
Start: 2023-01-19 | End: 2023-01-19 | Stop reason: HOSPADM

## 2023-01-19 RX ORDER — METRONIDAZOLE 500 MG/100ML
500 INJECTION, SOLUTION INTRAVENOUS
Status: COMPLETED | OUTPATIENT
Start: 2023-01-19 | End: 2023-01-19

## 2023-01-19 RX ORDER — SODIUM CHLORIDE, SODIUM LACTATE, POTASSIUM CHLORIDE, CALCIUM CHLORIDE 600; 310; 30; 20 MG/100ML; MG/100ML; MG/100ML; MG/100ML
INJECTION, SOLUTION INTRAVENOUS CONTINUOUS
Status: DISCONTINUED | OUTPATIENT
Start: 2023-01-19 | End: 2023-01-19 | Stop reason: HOSPADM

## 2023-01-19 RX ORDER — HYDROMORPHONE HYDROCHLORIDE 1 MG/ML
0.2 INJECTION, SOLUTION INTRAMUSCULAR; INTRAVENOUS; SUBCUTANEOUS EVERY 5 MIN PRN
Status: DISCONTINUED | OUTPATIENT
Start: 2023-01-19 | End: 2023-01-19 | Stop reason: HOSPADM

## 2023-01-19 RX ORDER — LIDOCAINE HYDROCHLORIDE 20 MG/ML
INJECTION, SOLUTION INFILTRATION; PERINEURAL PRN
Status: DISCONTINUED | OUTPATIENT
Start: 2023-01-19 | End: 2023-01-19

## 2023-01-19 RX ORDER — OXYCODONE HYDROCHLORIDE 5 MG/1
5 TABLET ORAL EVERY 6 HOURS PRN
Qty: 6 TABLET | Refills: 0 | Status: SHIPPED | OUTPATIENT
Start: 2023-01-19 | End: 2023-01-22

## 2023-01-19 RX ORDER — OXYCODONE HYDROCHLORIDE 5 MG/1
5 TABLET ORAL
Status: DISCONTINUED | OUTPATIENT
Start: 2023-01-19 | End: 2023-01-19 | Stop reason: HOSPADM

## 2023-01-19 RX ORDER — IBUPROFEN 200 MG
800 TABLET ORAL ONCE
Status: DISCONTINUED | OUTPATIENT
Start: 2023-01-19 | End: 2023-01-19 | Stop reason: HOSPADM

## 2023-01-19 RX ORDER — HEPARIN SODIUM 5000 [USP'U]/.5ML
5000 INJECTION, SOLUTION INTRAVENOUS; SUBCUTANEOUS
Status: COMPLETED | OUTPATIENT
Start: 2023-01-19 | End: 2023-01-19

## 2023-01-19 RX ORDER — ONDANSETRON 4 MG/1
4 TABLET, ORALLY DISINTEGRATING ORAL EVERY 8 HOURS PRN
Qty: 4 TABLET | Refills: 0 | Status: SHIPPED | OUTPATIENT
Start: 2023-01-19 | End: 2023-03-21

## 2023-01-19 RX ORDER — CEFAZOLIN SODIUM/WATER 2 G/20 ML
2 SYRINGE (ML) INTRAVENOUS SEE ADMIN INSTRUCTIONS
Status: DISCONTINUED | OUTPATIENT
Start: 2023-01-19 | End: 2023-01-19 | Stop reason: HOSPADM

## 2023-01-19 RX ORDER — HYDROMORPHONE HYDROCHLORIDE 1 MG/ML
0.4 INJECTION, SOLUTION INTRAMUSCULAR; INTRAVENOUS; SUBCUTANEOUS EVERY 5 MIN PRN
Status: DISCONTINUED | OUTPATIENT
Start: 2023-01-19 | End: 2023-01-19 | Stop reason: HOSPADM

## 2023-01-19 RX ORDER — IBUPROFEN 800 MG/1
800 TABLET, FILM COATED ORAL EVERY 6 HOURS PRN
Qty: 30 TABLET | Refills: 0 | Status: SHIPPED | OUTPATIENT
Start: 2023-01-19 | End: 2024-04-05

## 2023-01-19 RX ORDER — FENTANYL CITRATE 50 UG/ML
50 INJECTION, SOLUTION INTRAMUSCULAR; INTRAVENOUS EVERY 5 MIN PRN
Status: DISCONTINUED | OUTPATIENT
Start: 2023-01-19 | End: 2023-01-19 | Stop reason: HOSPADM

## 2023-01-19 RX ORDER — CEFAZOLIN SODIUM/WATER 2 G/20 ML
2 SYRINGE (ML) INTRAVENOUS
Status: COMPLETED | OUTPATIENT
Start: 2023-01-19 | End: 2023-01-19

## 2023-01-19 RX ORDER — KETOROLAC TROMETHAMINE 30 MG/ML
15 INJECTION, SOLUTION INTRAMUSCULAR; INTRAVENOUS ONCE
Status: COMPLETED | OUTPATIENT
Start: 2023-01-19 | End: 2023-01-19

## 2023-01-19 RX ORDER — PROPOFOL 10 MG/ML
INJECTION, EMULSION INTRAVENOUS PRN
Status: DISCONTINUED | OUTPATIENT
Start: 2023-01-19 | End: 2023-01-19

## 2023-01-19 RX ORDER — PHENAZOPYRIDINE HYDROCHLORIDE 200 MG/1
200 TABLET, FILM COATED ORAL ONCE
Status: COMPLETED | OUTPATIENT
Start: 2023-01-19 | End: 2023-01-19

## 2023-01-19 RX ORDER — FENTANYL CITRATE 50 UG/ML
25 INJECTION, SOLUTION INTRAMUSCULAR; INTRAVENOUS EVERY 5 MIN PRN
Status: DISCONTINUED | OUTPATIENT
Start: 2023-01-19 | End: 2023-01-19 | Stop reason: HOSPADM

## 2023-01-19 RX ORDER — LIDOCAINE 40 MG/G
CREAM TOPICAL
Status: DISCONTINUED | OUTPATIENT
Start: 2023-01-19 | End: 2023-01-19 | Stop reason: HOSPADM

## 2023-01-19 RX ORDER — AMOXICILLIN 250 MG
1-2 CAPSULE ORAL 2 TIMES DAILY
Qty: 30 TABLET | Refills: 0 | Status: SHIPPED | OUTPATIENT
Start: 2023-01-19 | End: 2023-03-21

## 2023-01-19 RX ORDER — ACETAMINOPHEN 325 MG/1
975 TABLET ORAL ONCE
Status: DISCONTINUED | OUTPATIENT
Start: 2023-01-19 | End: 2023-01-19 | Stop reason: HOSPADM

## 2023-01-19 RX ORDER — OXYCODONE HYDROCHLORIDE 5 MG/1
5 TABLET ORAL EVERY 6 HOURS PRN
Qty: 6 TABLET | Refills: 0 | Status: SHIPPED | OUTPATIENT
Start: 2023-01-19 | End: 2023-01-19

## 2023-01-19 RX ORDER — FENTANYL CITRATE 50 UG/ML
INJECTION, SOLUTION INTRAMUSCULAR; INTRAVENOUS PRN
Status: DISCONTINUED | OUTPATIENT
Start: 2023-01-19 | End: 2023-01-19

## 2023-01-19 RX ORDER — ACETAMINOPHEN 325 MG/1
975 TABLET ORAL EVERY 6 HOURS PRN
Qty: 50 TABLET | Refills: 0 | Status: SHIPPED | OUTPATIENT
Start: 2023-01-19 | End: 2024-04-05

## 2023-01-19 RX ORDER — SODIUM CHLORIDE, SODIUM LACTATE, POTASSIUM CHLORIDE, CALCIUM CHLORIDE 600; 310; 30; 20 MG/100ML; MG/100ML; MG/100ML; MG/100ML
INJECTION, SOLUTION INTRAVENOUS CONTINUOUS PRN
Status: DISCONTINUED | OUTPATIENT
Start: 2023-01-19 | End: 2023-01-19

## 2023-01-19 RX ORDER — ONDANSETRON 2 MG/ML
INJECTION INTRAMUSCULAR; INTRAVENOUS PRN
Status: DISCONTINUED | OUTPATIENT
Start: 2023-01-19 | End: 2023-01-19

## 2023-01-19 RX ORDER — ONDANSETRON 2 MG/ML
4 INJECTION INTRAMUSCULAR; INTRAVENOUS EVERY 30 MIN PRN
Status: DISCONTINUED | OUTPATIENT
Start: 2023-01-19 | End: 2023-01-19 | Stop reason: HOSPADM

## 2023-01-19 RX ORDER — DEXAMETHASONE SODIUM PHOSPHATE 4 MG/ML
INJECTION, SOLUTION INTRA-ARTICULAR; INTRALESIONAL; INTRAMUSCULAR; INTRAVENOUS; SOFT TISSUE PRN
Status: DISCONTINUED | OUTPATIENT
Start: 2023-01-19 | End: 2023-01-19

## 2023-01-19 RX ADMIN — HYDROMORPHONE HYDROCHLORIDE 0.5 MG: 1 INJECTION, SOLUTION INTRAMUSCULAR; INTRAVENOUS; SUBCUTANEOUS at 09:22

## 2023-01-19 RX ADMIN — ONDANSETRON 4 MG: 2 INJECTION INTRAMUSCULAR; INTRAVENOUS at 10:54

## 2023-01-19 RX ADMIN — PHENYLEPHRINE HYDROCHLORIDE 100 MCG: 10 INJECTION INTRAVENOUS at 07:55

## 2023-01-19 RX ADMIN — FENTANYL CITRATE 25 MCG: 50 INJECTION, SOLUTION INTRAMUSCULAR; INTRAVENOUS at 10:05

## 2023-01-19 RX ADMIN — PHENAZOPYRIDINE HYDROCHLORIDE 200 MG: 200 TABLET ORAL at 06:33

## 2023-01-19 RX ADMIN — PROPOFOL 100 MG: 10 INJECTION, EMULSION INTRAVENOUS at 07:40

## 2023-01-19 RX ADMIN — ONDANSETRON 4 MG: 2 INJECTION INTRAMUSCULAR; INTRAVENOUS at 09:10

## 2023-01-19 RX ADMIN — SODIUM CHLORIDE, POTASSIUM CHLORIDE, SODIUM LACTATE AND CALCIUM CHLORIDE: 600; 310; 30; 20 INJECTION, SOLUTION INTRAVENOUS at 09:22

## 2023-01-19 RX ADMIN — Medication 50 MG: at 07:40

## 2023-01-19 RX ADMIN — SUGAMMADEX 100 MG: 100 INJECTION, SOLUTION INTRAVENOUS at 09:32

## 2023-01-19 RX ADMIN — MIDAZOLAM 2 MG: 1 INJECTION INTRAMUSCULAR; INTRAVENOUS at 07:27

## 2023-01-19 RX ADMIN — FENTANYL CITRATE 50 MCG: 50 INJECTION, SOLUTION INTRAMUSCULAR; INTRAVENOUS at 10:24

## 2023-01-19 RX ADMIN — HYDROMORPHONE HYDROCHLORIDE 0.4 MG: 1 INJECTION, SOLUTION INTRAMUSCULAR; INTRAVENOUS; SUBCUTANEOUS at 10:30

## 2023-01-19 RX ADMIN — SODIUM CHLORIDE, POTASSIUM CHLORIDE, SODIUM LACTATE AND CALCIUM CHLORIDE: 600; 310; 30; 20 INJECTION, SOLUTION INTRAVENOUS at 07:32

## 2023-01-19 RX ADMIN — Medication 2 G: at 07:46

## 2023-01-19 RX ADMIN — FENTANYL CITRATE 25 MCG: 50 INJECTION, SOLUTION INTRAMUSCULAR; INTRAVENOUS at 10:15

## 2023-01-19 RX ADMIN — DEXAMETHASONE SODIUM PHOSPHATE 4 MG: 4 INJECTION, SOLUTION INTRA-ARTICULAR; INTRALESIONAL; INTRAMUSCULAR; INTRAVENOUS; SOFT TISSUE at 07:45

## 2023-01-19 RX ADMIN — METRONIDAZOLE 500 MG: 500 INJECTION, SOLUTION INTRAVENOUS at 07:47

## 2023-01-19 RX ADMIN — FENTANYL CITRATE 100 MCG: 50 INJECTION, SOLUTION INTRAMUSCULAR; INTRAVENOUS at 07:32

## 2023-01-19 RX ADMIN — KETOROLAC TROMETHAMINE 15 MG: 30 INJECTION, SOLUTION INTRAMUSCULAR; INTRAVENOUS at 12:10

## 2023-01-19 RX ADMIN — LIDOCAINE HYDROCHLORIDE 50 MG: 20 INJECTION, SOLUTION INFILTRATION; PERINEURAL at 07:40

## 2023-01-19 RX ADMIN — HEPARIN SODIUM 5000 UNITS: 5000 INJECTION, SOLUTION INTRAVENOUS; SUBCUTANEOUS at 06:33

## 2023-01-19 ASSESSMENT — ACTIVITIES OF DAILY LIVING (ADL)
ADLS_ACUITY_SCORE: 20

## 2023-01-19 NOTE — ANESTHESIA CARE TRANSFER NOTE
Patient: Carlos Eng    Procedure: Procedure(s):  Robotic removal of uterus, cervix, both ovaries and fallopian tubes  CYSTOSCOPY  Proctoscopy       Diagnosis: Intramural leiomyoma of uterus [D25.1]  Endometriosis [N80.9]  Diagnosis Additional Information: No value filed.    Anesthesia Type:   General     Note:    Oropharynx: oropharynx clear of all foreign objects and spontaneously breathing  Level of Consciousness: awake  Oxygen Supplementation: nasal cannula  Level of Supplemental Oxygen (L/min / FiO2): 3    Dentition: dentition unchanged  Vital Signs Stable: post-procedure vital signs reviewed and stable  Report to RN Given: handoff report given  Patient transferred to: PACU    Handoff Report: Identifed the Patient, Identified the Reponsible Provider, Reviewed the pertinent medical history, Discussed the surgical course, Reviewed Intra-OP anesthesia mangement and issues during anesthesia, Set expectations for post-procedure period and Allowed opportunity for questions and acknowledgement of understanding      Vitals:  Vitals Value Taken Time   BP     Temp     Pulse     Resp     SpO2         Electronically Signed By: EMA Barnes CRNA  January 19, 2023  9:46 AM

## 2023-01-19 NOTE — ANESTHESIA POSTPROCEDURE EVALUATION
Patient: Carlos Eng    Procedure: Procedure(s):  Robotic removal of uterus, cervix, both ovaries and fallopian tubes  CYSTOSCOPY  Proctoscopy       Anesthesia Type:  General    Note:  Disposition: Outpatient   Postop Pain Control: Uneventful            Sign Out: Well controlled pain   PONV:    Neuro/Psych: Uneventful            Sign Out: Acceptable/Baseline neuro status   Airway/Respiratory: Uneventful            Sign Out: Acceptable/Baseline resp. status   CV/Hemodynamics: Uneventful            Sign Out: Acceptable CV status; No obvious hypovolemia; No obvious fluid overload   Other NRE: NONE   DID A NON-ROUTINE EVENT OCCUR?            Last vitals:  Vitals Value Taken Time   /63 01/19/23 1100   Temp 36.1  C (97  F) 01/19/23 1100   Pulse 66 01/19/23 1112   Resp 11 01/19/23 1112   SpO2 98 % 01/19/23 1115   Vitals shown include unvalidated device data.    Electronically Signed By: Mannie Hines MD  January 19, 2023  1:42 PM

## 2023-01-19 NOTE — PROGRESS NOTES
"[facility] Post-Op Check      S: Pt reports she is feeling better. Had some nausea earlier that resolved with crackers and ginger ale. Lightheadedness earlier. Pain controlled with medication. Denies HA/dizziness, shortness of breath and chest pain. Ambulated to BR. Notes some slight burning with urination. Otherwise no difficulty with urination. No vaginal bleeding noted.      O:    /60 (BP Location: Left arm)   Pulse 70   Temp 97.3  F (36.3  C) (Oral)   Resp 16   Ht 1.499 m (4' 11\")   Wt 43 kg (94 lb 12.8 oz)   LMP 01/02/2023   SpO2 100%   BMI 19.15 kg/m   RA    General: NAD  CV:  Regular rate  Pulm: nonlabored breathing, good inspiratory effort. LS clear  Abd: soft, appropriately tender to palpation, nondistended.  No rebound or guarding  Incisions: well approximated and covered with surgical glue.      A:  47 year old F, POD#0 s/p RA-TLH, BSO, cysto and procto w/o complications, doing well in immediate postoperative period. Reviewed discharge instructions and when to call the clinic. Discussed surgical findings and awaiting final pathology. She will follow up with Dr Galvan as scheduled or sooner if needed.     P:   Dz: Leiomyoma of the uterus, Endometriosis. Final pathology pending.   FEN: reg diet  Pain: sched tylenol, ibuprofen, prn oxycodone  Heme:  mLs, no concerns for ongoing bleeding  CV: No issues.  Pulm: No issues.  GI: sched bowel regimen, prn antiemetics  : s/p lubin, voiding. Discussed dysuria likely secondary to lubin cath during surgery and should resolve within 24 hours. If it persist she is to call the clinic.  ID: Afebrile, s/p preop antibiotic ppx  Endo: No issues  Psych/Neuro: No issues  PPX: encourage early ambulation    Dispo: Discharge home with partner    Lizbeth Perkins APRN, DNP, CNP  1/19/2023 1:42 PM      "

## 2023-01-19 NOTE — DISCHARGE INSTRUCTIONS
Today you received Toradol, an antiinflammatory medication similar to Ibuprofen, at 12pm.  You should not take other antiinflammatory medication (Ibuprofen, Motrin, Advil, Aleve, Naprosyn, etc) until 6pm.            While you were at the hospital today you were given a medication called Pyridium.  This is used to treat pain, burning, increased urination, and increased urge to urinate. Pyridium will most likely darken the color of your urine to an orange or red color. Darkened urine may also cause stains to your underwear, which may or may not be removed by laundering.      Tips for taking pain medications  To get the best pain relief possible , remember these points:  Take pain medications as directed, before pain becomes severe  Pain medication can upset your stomach: taking it with food may help  Constipation is a common side effect of pain medication. Drink plenty of  Fluids  Eat foods high in fiber. Take a stool softener  if recommended by your doctor or  Pharmacist.  Do not drink alcohol, drive or operate machinery while taking pain medications.  Ask about other ways to control pain, such as with heat, ice or relaxation.

## 2023-01-19 NOTE — ANESTHESIA PREPROCEDURE EVALUATION
Anesthesia Pre-Procedure Evaluation    Patient: Carlos Eng   MRN: [id number] : 1975        Procedure : Procedure(s):  Robotic removal of uterus, cervix, both ovaries and fallopian tubes  possible HYSTERECTOMY, TOTAL, ABDOMINAL, WITH SALPINGO-OOPHORECTOMY  CYSTOSCOPY          History reviewed. No pertinent past medical history.   Past Surgical History:   Procedure Laterality Date     BACK SURGERY      mayte placement for scoliosis     COLONOSCOPY N/A 2021    Procedure: COLONOSCOPY, WITH POLYPECTOMY AND BIOPSY;  Surgeon: Delma Keane MD;  Location:  GI     GYN SURGERY  ,           No Known Allergies   Social History     Tobacco Use     Smoking status: Never     Smokeless tobacco: Never   Substance Use Topics     Alcohol use: Not Currently     Comment: Occ      Wt Readings from Last 1 Encounters:   23 43 kg (94 lb 12.8 oz)        Anesthesia Evaluation   Pt has had prior anesthetic. Type: General.        ROS/MED HX  ENT/Pulmonary:  - neg pulmonary ROS     Neurologic:  - neg neurologic ROS     Cardiovascular:  - neg cardiovascular ROS     METS/Exercise Tolerance:     Hematologic:  - neg hematologic  ROS     Musculoskeletal:  - neg musculoskeletal ROS     GI/Hepatic: Comment: Laparoscopic CATHI in       Renal/Genitourinary: Comment: Leiomyoma, endometriosis, per gyn/onc note      Endo:       Psychiatric/Substance Use:  - neg psychiatric ROS     Infectious Disease:  - neg infectious disease ROS     Malignancy:  - neg malignancy ROS     Other:            Physical Exam    Airway        Mallampati: II   TM distance: > 3 FB   Neck ROM: full   Mouth opening: > 3 cm    Respiratory Devices and Support         Dental     Comment: Nothing loose or removable        Cardiovascular   cardiovascular exam normal          Pulmonary   pulmonary exam normal                OUTSIDE LABS:  CBC:   Lab Results   Component Value Date    WBC 5.9 2023    WBC 5.7 2022    HGB 13.2  01/19/2023    HGB 13.2 08/09/2022    HCT 42.0 01/19/2023    HCT 41.7 08/09/2022     01/19/2023     08/09/2022     BMP:   Lab Results   Component Value Date     08/09/2022     01/12/2022    POTASSIUM 4.3 08/09/2022    POTASSIUM 3.6 01/12/2022    CHLORIDE 105 08/09/2022    CHLORIDE 102 01/12/2022    CO2 27 08/09/2022    CO2 28 01/12/2022    BUN 5 (L) 08/09/2022    BUN 11 01/12/2022    CR 0.60 08/09/2022    CR 0.60 01/12/2022    GLC 82 01/19/2023    GLC 75 08/09/2022     COAGS:   Lab Results   Component Value Date    INR 0.91 01/18/2011     POC:   Lab Results   Component Value Date    HCG Negative 01/12/2022     HEPATIC:   Lab Results   Component Value Date    ALBUMIN 3.8 08/09/2022    PROTTOTAL 7.6 08/09/2022    ALT 18 08/09/2022    AST 19 08/09/2022    ALKPHOS 25 (L) 08/09/2022    BILITOTAL 0.5 08/09/2022     OTHER:   Lab Results   Component Value Date    KARLA 9.1 08/09/2022    PHOS 3.4 12/18/2010       Anesthesia Plan    ASA Status:  2   NPO Status:  NPO Appropriate    Anesthesia Type: General.     - Airway: ETT   Induction: Intravenous.   Maintenance: Balanced.   Techniques and Equipment:     - Lines/Monitors: 2nd IV     Consents    Anesthesia Plan(s) and associated risks, benefits, and realistic alternatives discussed. Questions answered and patient/representative(s) expressed understanding.    - Discussed:     - Discussed with:  Patient      - Patient is DNR/DNI Status: No    Use of blood products discussed: Yes.     - Discussed with: Patient.     Postoperative Care       PONV prophylaxis: Ondansetron (or other 5HT-3), Dexamethasone or Solumedrol, Background Propofol Infusion     Comments:                Mannie Hines MD

## 2023-01-19 NOTE — ANESTHESIA POSTPROCEDURE EVALUATION
Patient: Carlos Eng    Procedure: Procedure(s):  Robotic removal of uterus, cervix, both ovaries and fallopian tubes  CYSTOSCOPY  Proctoscopy       Anesthesia Type:  General    Note:  Disposition: Admission   Postop Pain Control: Challenging            Challenges/Interventions: Acute Pain            Sign Out: Well controlled pain   PONV: Yes            Symptoms: Nausea only            Sign Out: PONV/POV resolved with treatment   Neuro/Psych: Uneventful            Sign Out: Acceptable/Baseline neuro status   Airway/Respiratory: Uneventful            Sign Out: Acceptable/Baseline resp. status   CV/Hemodynamics: Uneventful            Sign Out: Acceptable CV status; No obvious hypovolemia; No obvious fluid overload   Other NRE: NONE   DID A NON-ROUTINE EVENT OCCUR? No           Last vitals:  Vitals Value Taken Time   /63 01/19/23 1045   Temp 35.6  C (96  F) 01/19/23 1000   Pulse 68 01/19/23 1055   Resp 6 01/19/23 1055   SpO2 100 % 01/19/23 1055   Vitals shown include unvalidated device data.    Electronically Signed By: Leslie Goldberg, MD  January 19, 2023  10:57 AM

## 2023-01-19 NOTE — PROGRESS NOTES
Call to patient to review surgical instructions. No covid test needed per policy. Patient was provided with surgical folder and soap at visit with Dr Galvan on 12/6/2026.     GYN ONC Pre-Op Education - Nursing     Relevant Diagnosis: Endometriosis, leiomyoma    Teaching Topic: Surgical instructions for Robotic removal of uterus, cervix, both ovaries and fallopian tubes, cystoscopy, and possible open     Teaching Concerns addressed: Yes    Patient demonstrates understanding of the following:   Reason for the appointment, diagnosis and treatment plan:   Yes   Knowledge of proper use of medications and conditions for which they are ordered (with special attention to potential side effects or drug interactions): Yes   Which situations necessitate calling provider and whom to contact: Yes       Nutritional needs and diet plan:  Yes      Pain management techniques:  Yes  Diet:  Yes     Infection Prevention:  Patient demonstrates understanding of the following:   Pre-Op CHG Bathing Instructions: Yes  Surgical procedure site care taught:   Yes   Signs and symptoms of infection taught: Yes     Instructional Materials Used/Given:  [facility] Preparing for Your Surgery  Showering or Bathing before Surgery Instructions & CHG Product (2 bottles)  Home Care after Major Abdominal or Vaginal Surgery  Map  Tips to Increase the Protein in Your Diet  Phone number for [facility]     Comments:  Reviewed NPO restrictions prior to surgery with pt (No food or milk products 8 hours prior to arrival of surgery; Only clear liquids up to 2 hours prior to arrival of  surgery i.e., water, black coffee, tea, apple juice).  Also reviewed medications that must be held for at least 7 days prior to surgery (Aspirin, Ibuprofen, Naproxen, Fish oil/Flax seed oil, Multivitamin/Vit. E, or any other product containing aspirin, or NSAIDs). Pt does not have routine medications to take on the morning and will hold supplements for 7 days prior  to surgery.  Pt will need to see Dr Galvan, 1-2 weeks after her surgery. Pt will need someone to drive her home after surgery, and someone to stay with her for at least 24 hours after she arrives home. Reviewed with pt signs and symptoms that would warrant contacting provider immediately.  Also reviewed lifting restrictions after surgery with patient per AVS instructions and also nothing in the vagina, no tampons, intercourse, or douching for eight weeks.  Pt had the opportunity to ask questions, and all questions were answered to her satisfaction.  Consent form was faxed to the [facility] pre-admissions at [phone].  Pt was instructed to call the clinic with any questions, concerns, or worsening symptoms.     Maria Esther Navarro RN, BSN, OCN

## 2023-01-19 NOTE — OP NOTE
[facility]    Brief Operative Note    Pre-operative diagnosis: Intramural leiomyoma of uterus [D25.1]  Endometriosis [N80.9]  Post-operative diagnosis Same as pre-operative diagnosis    Procedure: Robotic total laparoscopic hysterectomy, bilateral salpingo-oophorectomy, lysis of adhesions for > 30 minutes, cystoscopy, proctoscopy    Surgeon: Surgeon(s) and Role:     * Charlotte Irvin MD - Primary     * Selam Le MD - Resident - Assisting     * Darryl Garza MD - Resident - Assisting     * Donald Painter MD - Fellow - Assisting    Anesthesia: General     Estimated Blood Loss: 100 mL    Drains: None  Specimens:   ID Type Source Tests Collected by Time Destination   1 : pelvic wasings Washings Pelvis NON-GYNECOLOGIC CYTOLOGY Charlotte Irvin MD 1/19/2023  8:18 AM    2 : Uterus; Cervix, Bilateral fallopian Tuebes and Bilateral Ovaries Tissue Uterus, Cervix, Bilateral Fallopian Tubes & Ovaries SURGICAL PATHOLOGY EXAM Charlotte Ivrin MD 1/19/2023  9:04 AM      Findings:   small,mobile uterus on bimanual exam.  Normal external genitalia. Normal vaginal mucosa and cervix. On laparoscopy, normal upper abdominal survey.  Dense adhesions fromthe sigmoid colon to the left Ip ligament, fallopian tube, and posterior uterus.  Moderate adhesions from the right adnexa to the right cul de sac. Moderate adhesions from the bladder to the anterior uterus.  Uterus enlarged to 14 week size with multiple fibroids.  Normal appearing bilateral ovaries and fallopian tubes other than dense adhesive disease.  Normal cystoscopy and proctoscopy at the end of the case.     .  Complications: None.      INDICATIONS: The patient is a 47 year old premenopausal female who was found to have dense adhesive disease from endometriosis on prior laparoscopy where plan had been for hysterectomy at which point the procedure was aborted.  She was referred to gynecologic  oncology and initially opted for conservative/medical management and her symptoms persisted and she thus desired definitive treatment.  She was also noted to have an enlarged fibroid uterus.. She was counseled regarding management options and consented to undergo the procedures listed above. Following a thorough discussion of the risks, benefits, indications and alternatives she consented to the above procedure.    PROCEDURE: Following informed consent, the patient was taken to the operating room, where she underwent general anesthesia without difficulty. She was placed in dorsal lithotomy position using the Solitario stirrups and was prepped and draped in normal sterile fashion.    The V care uterus manipulator was place in the usual fashion. Attention was then turned to the laparoscopic portion of the case. The umbilicus was everted using perforating towel clamps, and a Veress needle was introduced intraperitoneally. The abdomen was insufflated with carbon dioxide gas. Opening pressure was less than 5 mm Hg. The Veress was removed, and a vertical skin incision was made approximately 4 cm above the umbilicus to accommodate an 8 mm trochar, which was placed without difficulty. The laparoscope was introduced, and no evidence of trauma beneath the port site was found. Abdominal survey was performed with findings as described above. The bilateral hemidiaphragms and liver were examined and found to be free of gross abnormalities. The lower abdomen and pelvis were likewise inspected and appeared to be normal.    The patient was placed in steep [place], and two additional 8 mm ports were placed in the left upper quadrant followed by a 5 mm assistant port and another 8 mm robotic port in the right upper quadrant, all under direct visualization. The small bowel was swept into the upper abdomen, and pelvic washings were obtained. The Da-Isabel was then docked and the robotic portion of the case was initiated.     The left  round ligament was cauterized and divided at the pelvic sidewall using the monopolar scissors. The retroperitoneal space was opened to above the level of the IP ligament. The ureter was visualized along the medial leaf of the broad ligament and was noted to be well below the IP.  The colon was densely adherent to the IP ligament and fallopian tube and this was taken down with a combination of sharp and blunt dissection. A window was made in the peritoneum below the IP ligament which was then sealed with the bipolar cautery and divided. Attention was turned to the patient's right side. The right round ligament was cauterized and divided at the pelvic sidewall using the monopolar scissors. The retroperitoneal space was opened to above the level of the IP ligament. The ureter was visualized along the medial leaf of the broad ligament and was noted to be well below the IP.  A window was made in the peritoneum below the IP ligament which was then sealed with the bipolarcautery and divided. Dissection proceeded further caudally and medially through the broad ligament to reach the level of the uterine vessels. The bladder flap was taken down. The rectum was dissected from the posterior uterine segment with a combination of blunt and sharp dissection. The uterine vessels were skeletonized, cauterized, and divided. The vaginotomy was made anteriorly and carried out circumferentially over V care cup. The uterus, cervix, and bilateral adnexa were removed via the vagina and sent to Pathology. A vaginal occluder balloon was placed in the vagina and inflated to maintain pneumoperitoneum.   The vaginal cuff was closed with a running V-lock suture.  A cystoscopy was performed with vigorous efflux from bilateral ureters and normal bladder mucosa without injury.  A proctoscopy was then performed without air leak on insufflation.     The vaginal occluder balloon was removed intact from the vagina. The Sen catheter was then removed.  The abdomen and pelvis were irrigated and verified to be free of any bleeding.The robot was undocked. All  ports were removed, and the abdomen was exsufflated. Skin at each port site was closed with subcuticular stitches of 4-0 monocryl and covered dermabond.     The patient tolerated the procedure well and was transferred to PACU in stable condition. All sponge, needle, and instrument counts were correct x 2. Dr. Juarez was present for the entire procedure.     Donald Painter MD

## 2023-01-19 NOTE — ANESTHESIA PROCEDURE NOTES
Airway       Patient location during procedure: OR       Procedure Start/Stop Times: 1/19/2023 7:43 AM  Staff -        CRNA: Figueroa Bennett APRN CRNA       Performed By: CRNA  Consent for Airway        Urgency: elective  Indications and Patient Condition       Indications for airway management: kush-procedural       Induction type:intravenous       Mask difficulty assessment: 1 - vent by mask    Final Airway Details       Final airway type: endotracheal airway       Successful airway: ETT - single  Endotracheal Airway Details        ETT size (mm): 6.5       Cuffed: yes       Successful intubation technique: direct laryngoscopy       DL Blade Type: Lopez 2       Grade View of Cords: 1       Position: Right       Measured from: lips       Secured at (cm): 19       Bite block used: None    Post intubation assessment        Placement verified by: capnometry, equal breath sounds and chest rise        Number of attempts at approach: 1       Number of other approaches attempted: 0       Secured with: silk tape       Ease of procedure: easy       Dentition: Intact and Unchanged    Medication(s) Administered   Medication Administration Time: 1/19/2023 7:43 AM

## 2023-01-20 LAB
PATH REPORT.COMMENTS IMP SPEC: NORMAL
PATH REPORT.FINAL DX SPEC: NORMAL
PATH REPORT.GROSS SPEC: NORMAL
PATH REPORT.MICROSCOPIC SPEC OTHER STN: NORMAL
PATH REPORT.RELEVANT HX SPEC: NORMAL

## 2023-01-23 ENCOUNTER — PATIENT OUTREACH (OUTPATIENT)
Dept: ONCOLOGY | Facility: CLINIC | Age: 48
End: 2023-01-23
Payer: COMMERCIAL

## 2023-01-23 NOTE — PROGRESS NOTES
Message left for patient to call back for post surgical follow up  Shaunna Sam RN on 1/23/2023 at 10:35 AM

## 2023-01-28 ENCOUNTER — TELEPHONE (OUTPATIENT)
Dept: FAMILY MEDICINE | Facility: CLINIC | Age: 48
End: 2023-01-28
Payer: COMMERCIAL

## 2023-01-29 NOTE — TELEPHONE ENCOUNTER
Please call the patient and schedule Annual physical with me, which patient is due at this time, to further take care of the medical conditions and medications.OK to use same day slot / double book near another virtual slot in 4 weeks.     Thank you,  Alexandra Mayorga MD on 1/28/2023

## 2023-01-30 LAB
PATH REPORT.ADDENDUM SPEC: NORMAL
PATH REPORT.COMMENTS IMP SPEC: NORMAL
PATH REPORT.FINAL DX SPEC: NORMAL
PATH REPORT.GROSS SPEC: NORMAL
PATH REPORT.MICROSCOPIC SPEC OTHER STN: NORMAL
PATH REPORT.RELEVANT HX SPEC: NORMAL
PHOTO IMAGE: NORMAL

## 2023-01-30 NOTE — PROGRESS NOTES
Shaunna Sam, JULY  You 3 days ago     MP  I have tried to reach this patient three time without success. She is scheduled for her post op on 1/31      We also have not received patient's FMLA forms but have not been able to update patient with no call back.  Will discuss with patient on 1/31 at post op visit.    Maria Esther Navarro RN, BSN, OCN

## 2023-01-31 ENCOUNTER — ONCOLOGY VISIT (OUTPATIENT)
Dept: ONCOLOGY | Facility: CLINIC | Age: 48
End: 2023-01-31
Attending: OBSTETRICS & GYNECOLOGY
Payer: COMMERCIAL

## 2023-01-31 VITALS
DIASTOLIC BLOOD PRESSURE: 64 MMHG | OXYGEN SATURATION: 100 % | SYSTOLIC BLOOD PRESSURE: 86 MMHG | TEMPERATURE: 97.4 F | HEART RATE: 63 BPM | BODY MASS INDEX: 19.46 KG/M2 | HEIGHT: 58 IN | WEIGHT: 92.7 LBS | RESPIRATION RATE: 16 BRPM

## 2023-01-31 DIAGNOSIS — D25.1 INTRAMURAL LEIOMYOMA OF UTERUS: Primary | ICD-10-CM

## 2023-01-31 DIAGNOSIS — N80.9 ENDOMETRIOSIS: ICD-10-CM

## 2023-01-31 PROCEDURE — 99213 OFFICE O/P EST LOW 20 MIN: CPT | Performed by: OBSTETRICS & GYNECOLOGY

## 2023-01-31 PROCEDURE — 99024 POSTOP FOLLOW-UP VISIT: CPT | Performed by: OBSTETRICS & GYNECOLOGY

## 2023-01-31 ASSESSMENT — PAIN SCALES - GENERAL: PAINLEVEL: NO PAIN (0)

## 2023-01-31 NOTE — PROGRESS NOTES
Consult Notes on Referred Patient        Dr. Karol Kang MD  [address] [place] [address]       RE: Carlos Eng  : 1975  KARENA: 2023    HPI:  Carlos Eng is 47 year old with endometriosis and symptomatic leiomyoma.  She is unaccompanied today.  She is doing well post operatively.  No concerns.  No vaginal bleeding but having a slight vaginal discharge since surgery.  She denies menopausal symptoms.    22:  US pelvis:  Multiple fibroids evident measuring up to 3.4 cm in the right fundus. The uterus is 10.8 x 8.6 x 6.2 cm. Endometrial stripe measures 2 mm and is normal for patient's age and menstrual status. The right ovary is not seen due to overlying bowel gas. The left ovary demonstrates a small simple cyst.  No left adnexal masses are present. No free pelvic fluid is present.    22:  Laparoscopy, lysis of adhesions with Dr Kang    23:  Robotic total laparoscopic hysterectomy, bilateral salpingo-oophorectomy, lysis of adhesions for > 30 minutes, cystoscopy, proctoscopy   Pathology:  Benign      Obstetrics and Gynecology History:  , c/s x 2  Has completed childbearing      Past Medical History:  History reviewed. No pertinent past medical history.    Past Surgical History:  Past Surgical History:   Procedure Laterality Date     BACK SURGERY      mayte placement for scoliosis     COLONOSCOPY N/A 2021    Procedure: COLONOSCOPY, WITH POLYPECTOMY AND BIOPSY;  Surgeon: Delma Keane MD;  Location:  GI     CYSTOSCOPY N/A 2023    Procedure: CYSTOSCOPY;  Surgeon: Charlotte Irvin MD;  Location: [facility] OR     CYSTOSCOPY, General  2023     DAVINCI HYSTERECTOMY TOTAL, BILATERAL SALPINGO-OOPHORECTOMY, COMBINED Bilateral 2023    Procedure: Robotic removal of uterus, cervix, both ovaries and fallopian tubes;  Surgeon: Charlotte Irvin MD;  Location: [facility] OR     GYN SURGERY            PROCTOSCOPY  2023      "PROCTOSCOPY N/A 1/19/2023    Procedure: Proctoscopy;  Surgeon: Charlotte Irvin MD;  Location: [facility] OR     Robotic removal of uterus, cervix, both ovaries and fallopian tubes - Bilateral  01/19/2023       Health Maintenance:  Last Pap Smear: No need for further pap smears s/p hysterectomy  She has not had a history of abnormal Pap smears.    Last Mammogram: 4/18/22              Result: normal      She has not had a history of abnormal mammograms.    Last Colonoscopy: 2/12/21              Result: Polyp-repeat 5 years       Social History:  Lives with her , feels safe at home.  Works for [employer].  Does not have an advanced directive on file and would like her Darryl to be her POA.  Full code.    Family History:   The patient's family history is significant for.  Family History   Problem Relation Age of Onset     Diabetes Mother      Endocrine Disease Mother      Lung Cancer Father      Colon Cancer Sister      Cerebrovascular Disease Maternal Grandmother      Diabetes Maternal Uncle      Cerebrovascular Disease Maternal Uncle      Coronary Artery Disease No family hx of      Hypertension No family hx of      Hyperlipidemia No family hx of      Breast Cancer No family hx of      Prostate Cancer No family hx of      Other Cancer No family hx of      Depression No family hx of      Anxiety Disorder No family hx of      Mental Illness No family hx of      Substance Abuse No family hx of      Anesthesia Reaction No family hx of      Asthma No family hx of      Osteoporosis No family hx of      Genetic Disorder No family hx of      Thyroid Disease No family hx of      Obesity No family hx of      Unknown/Adopted No family hx of          Physical Exam:   BP (!) 86/64   Pulse 63   Temp 97.4  F (36.3  C) (Tympanic)   Resp 16   Ht 1.473 m (4' 10\")   Wt 42 kg (92 lb 11.2 oz)   LMP 01/02/2023   SpO2 100%   BMI 19.37 kg/m    Body mass index is 19.37 kg/m .  General Appearance: healthy and " alert, no distress     Gastrointestinal:       abdomen soft, non-tender, non-distended, no organomegaly or masses, port sites without erythema/induration or drainage    Genitourinary: External genitalia and urethral meatus appears normal.  Vagina is smooth with a well healing vaginal cuff with a small amount of yellow discharge    Labs:  None      Assessment:    Carlos Eng is a 47 year old woman with benign findings.    A total of 10 minutes was spent on patient care today.       Plan:     1.)    We reviewed her surgical findings and pathology.  Given the benign findings, she no longer needs follow up with the gynecologic oncology clinic.  She also no longer needs screening pap smears.  She should continue to have routine annual exams including a pelvic exam with her primary gynecologist or PCP.  She was instructed to call if any concerns arise.      2.) Genetic risk factors were assessed and the patient does not meet the qualifications for a referral.      3.) Labs and/or tests ordered include:  None.     4.) Health maintenance issues addressed today include pt is up to date.          Thank you for allowing us to participate in the care of your patient.         Sincerely,    Charlotte Galvan MD  Gynecologic Oncology  [facility]       [facility]

## 2023-01-31 NOTE — NURSING NOTE
"Oncology Rooming Note    January 31, 2023 12:41 PM   Carlos Eng is a 47 year old female who presents for:    Chief Complaint   Patient presents with     Oncology Clinic Visit     Intramural leiomyoma of uterus       Initial Vitals: BP (!) 86/64   Pulse 63   Temp 97.4  F (36.3  C) (Tympanic)   Resp 16   Ht 1.473 m (4' 10\")   Wt 42 kg (92 lb 11.2 oz)   LMP 01/02/2023   SpO2 100%   BMI 19.37 kg/m   Estimated body mass index is 19.37 kg/m  as calculated from the following:    Height as of this encounter: 1.473 m (4' 10\").    Weight as of this encounter: 42 kg (92 lb 11.2 oz). Body surface area is 1.31 meters squared.  No Pain (0) Comment: Data Unavailable   Patient's last menstrual period was 01/02/2023.  Allergies reviewed: Yes  Medications reviewed: Yes    Medications: Medication refills not needed today.  Pharmacy name entered into [facility]: [id number] [place], [place] - [address]    Clinical concerns: follow up       Bhargavi Randhawa CMA              "

## 2023-01-31 NOTE — LETTER
January 31, 2023    RE:  Carlos Eng                              [address]  [place] [address]      To Whom It May Concern:    This letter is regarding my patient Ms. Carlos Eng who is under my care following surgery on January 19, 2023.  Ms. Eng is cleared to return to work on February 16, 2023 but she will need to continue to follow no heavy lifting > 20 pounds for 6 weeks following surgery ( March 2, 2023).     Please let me know if you have further questions.    Sincerely,      Charlotte Edwards MD  [facility]  Department of Gynecologic Oncology

## 2023-01-31 NOTE — LETTER
2023         RE: Carlos Eng  [address]  [place] [address]        Dear Colleague,    Thank you for referring your patient, Carlos Eng, to the [facility]. Please see a copy of my visit note below.    Consult Notes on Referred Patient        Dr. Karol Kang MD  [address] [place] [address]       RE: Carlos Eng  : 1975  KARENA: 2023    HPI:  Carlos Eng is 47 year old with endometriosis and symptomatic leiomyoma.  She is unaccompanied today.  She is doing well post operatively.  No concerns.  No vaginal bleeding but having a slight vaginal discharge since surgery.  She denies menopausal symptoms.    22:  US pelvis:  Multiple fibroids evident measuring up to 3.4 cm in the right fundus. The uterus is 10.8 x 8.6 x 6.2 cm. Endometrial stripe measures 2 mm and is normal for patient's age and menstrual status. The right ovary is not seen due to overlying bowel gas. The left ovary demonstrates a small simple cyst.  No left adnexal masses are present. No free pelvic fluid is present.    22:  Laparoscopy, lysis of adhesions with Dr Kang    23:  Robotic total laparoscopic hysterectomy, bilateral salpingo-oophorectomy, lysis of adhesions for > 30 minutes, cystoscopy, proctoscopy   Pathology:  Benign      Obstetrics and Gynecology History:  , c/s x 2  Has completed childbearing      Past Medical History:  History reviewed. No pertinent past medical history.    Past Surgical History:  Past Surgical History:   Procedure Laterality Date     BACK SURGERY      mayte placement for scoliosis     COLONOSCOPY N/A 2021    Procedure: COLONOSCOPY, WITH POLYPECTOMY AND BIOPSY;  Surgeon: Delma Keane MD;  Location:  GI     CYSTOSCOPY N/A 2023    Procedure: CYSTOSCOPY;  Surgeon: Charlotte Irvin MD;  Location: [facility]     CYSTOSCOPY, General  2023     DAVINCI HYSTERECTOMY TOTAL, BILATERAL  SALPINGO-OOPHORECTOMY, COMBINED Bilateral 2023    Procedure: Robotic removal of uterus, cervix, both ovaries and fallopian tubes;  Surgeon: Charlotte Irvin MD;  Location: [facility] OR     GYN SURGERY  ,          PROCTOSCOPY  2023     PROCTOSCOPY N/A 2023    Procedure: Proctoscopy;  Surgeon: Charlotte Irvin MD;  Location: [facility] OR     Robotic removal of uterus, cervix, both ovaries and fallopian tubes - Bilateral  2023       Health Maintenance:  Last Pap Smear: No need for further pap smears s/p hysterectomy  She has not had a history of abnormal Pap smears.    Last Mammogram: 22              Result: normal      She has not had a history of abnormal mammograms.    Last Colonoscopy: 21              Result: Polyp-repeat 5 years       Social History:  Lives with her , feels safe at home.  Works for [employer].  Does not have an advanced directive on file and would like her Darryl to be her POA.  Full code.    Family History:   The patient's family history is significant for.  Family History   Problem Relation Age of Onset     Diabetes Mother      Endocrine Disease Mother      Lung Cancer Father      Colon Cancer Sister      Cerebrovascular Disease Maternal Grandmother      Diabetes Maternal Uncle      Cerebrovascular Disease Maternal Uncle      Coronary Artery Disease No family hx of      Hypertension No family hx of      Hyperlipidemia No family hx of      Breast Cancer No family hx of      Prostate Cancer No family hx of      Other Cancer No family hx of      Depression No family hx of      Anxiety Disorder No family hx of      Mental Illness No family hx of      Substance Abuse No family hx of      Anesthesia Reaction No family hx of      Asthma No family hx of      Osteoporosis No family hx of      Genetic Disorder No family hx of      Thyroid Disease No family hx of      Obesity No family hx of      Unknown/Adopted No family hx of   "        Physical Exam:   BP (!) 86/64   Pulse 63   Temp 97.4  F (36.3  C) (Tympanic)   Resp 16   Ht 1.473 m (4' 10\")   Wt 42 kg (92 lb 11.2 oz)   LMP 01/02/2023   SpO2 100%   BMI 19.37 kg/m    Body mass index is 19.37 kg/m .  General Appearance: healthy and alert, no distress     Gastrointestinal:       abdomen soft, non-tender, non-distended, no organomegaly or masses, port sites without erythema/induration or drainage    Genitourinary: External genitalia and urethral meatus appears normal.  Vagina is smooth with a well healing vaginal cuff with a small amount of yellow discharge    Labs:  None      Assessment:    Carlos Eng is a 47 year old woman with benign findings.    A total of 10 minutes was spent on patient care today.       Plan:     1.)    We reviewed her surgical findings and pathology.  Given the benign findings, she no longer needs follow up with the gynecologic oncology clinic.  She also no longer needs screening pap smears.  She should continue to have routine annual exams including a pelvic exam with her primary gynecologist or PCP.  She was instructed to call if any concerns arise.      2.) Genetic risk factors were assessed and the patient does not meet the qualifications for a referral.      3.) Labs and/or tests ordered include:  None.     4.) Health maintenance issues addressed today include pt is up to date.          Thank you for allowing us to participate in the care of your patient.         Sincerely,    Charlotte Galvan MD  Gynecologic Oncology  [facility]       [facility]        Again, thank you for allowing me to participate in the care of your patient.        Sincerely,        Jerry Galvan MD    "

## 2023-03-17 ENCOUNTER — PATIENT OUTREACH (OUTPATIENT)
Dept: ONCOLOGY | Facility: CLINIC | Age: 48
End: 2023-03-17
Payer: COMMERCIAL

## 2023-03-17 NOTE — PROGRESS NOTES
Return call to patient and left message informing patient that we have not received updated forms from [facility], also provided out fax number on message.  Await call back from patient.    Maria Esther Navarro RN, BSN, OCN

## 2023-03-17 NOTE — PROGRESS NOTES
Writer received call from Carlos wondering if we received follow-up paperwork from [facility] yesterday regarding her FMLA request. Carlos reports the [facility] is needing additional information regarding her pre-op and post-op care. [facility] was going to fax the request for additional information to the clinic yesterday.     Writer will route to Dr. Galvan's RNCC, [facility], for follow-up. Carlos can be reached at [phone].    Kitty Claudio, RN, BSN, OCN  Nurse Care Coordinator  [facility]  P: [phone]     F: [phone]

## 2023-03-18 ASSESSMENT — ENCOUNTER SYMPTOMS
COUGH: 0
CONSTIPATION: 0
HEARTBURN: 0
DIZZINESS: 0
HEADACHES: 0
MYALGIAS: 0
HEMATURIA: 0
FREQUENCY: 0
SORE THROAT: 0
JOINT SWELLING: 0
NERVOUS/ANXIOUS: 0
SHORTNESS OF BREATH: 0
ARTHRALGIAS: 0
HEMATOCHEZIA: 0
CHILLS: 0
ABDOMINAL PAIN: 0
WEAKNESS: 0
EYE PAIN: 0
BREAST MASS: 0
NAUSEA: 0
PALPITATIONS: 0
PARESTHESIAS: 0
DIARRHEA: 0
DYSURIA: 0
FEVER: 0

## 2023-03-20 NOTE — PROGRESS NOTES
Follow up call to patient again and left message stating we have not received forms from [facility].  Patient to call back if further assistance needed.    Maria Esther Navarro RN, BSN, OCN

## 2023-03-21 ENCOUNTER — OFFICE VISIT (OUTPATIENT)
Dept: FAMILY MEDICINE | Facility: CLINIC | Age: 48
End: 2023-03-21
Payer: COMMERCIAL

## 2023-03-21 VITALS
SYSTOLIC BLOOD PRESSURE: 102 MMHG | WEIGHT: 89 LBS | BODY MASS INDEX: 18.68 KG/M2 | DIASTOLIC BLOOD PRESSURE: 68 MMHG | HEIGHT: 58 IN | TEMPERATURE: 97 F | OXYGEN SATURATION: 99 % | HEART RATE: 65 BPM

## 2023-03-21 DIAGNOSIS — Z12.31 VISIT FOR SCREENING MAMMOGRAM: ICD-10-CM

## 2023-03-21 DIAGNOSIS — D25.1 INTRAMURAL LEIOMYOMA OF UTERUS: ICD-10-CM

## 2023-03-21 DIAGNOSIS — Z00.00 ROUTINE GENERAL MEDICAL EXAMINATION AT A HEALTH CARE FACILITY: Primary | ICD-10-CM

## 2023-03-21 DIAGNOSIS — Z86.39 HISTORY OF VITAMIN D DEFICIENCY: ICD-10-CM

## 2023-03-21 DIAGNOSIS — E78.5 DYSLIPIDEMIA: ICD-10-CM

## 2023-03-21 PROBLEM — N92.0 EXCESSIVE OR FREQUENT MENSTRUATION: Status: RESOLVED | Noted: 2017-02-24 | Resolved: 2023-03-21

## 2023-03-21 LAB
CHOLEST SERPL-MCNC: 214 MG/DL
DEPRECATED CALCIDIOL+CALCIFEROL SERPL-MC: 14 UG/L (ref 20–75)
HDLC SERPL-MCNC: 65 MG/DL
LDLC SERPL CALC-MCNC: 134 MG/DL
NONHDLC SERPL-MCNC: 149 MG/DL
TRIGL SERPL-MCNC: 75 MG/DL

## 2023-03-21 PROCEDURE — 80061 LIPID PANEL: CPT | Performed by: INTERNAL MEDICINE

## 2023-03-21 PROCEDURE — 36415 COLL VENOUS BLD VENIPUNCTURE: CPT | Performed by: INTERNAL MEDICINE

## 2023-03-21 PROCEDURE — 90471 IMMUNIZATION ADMIN: CPT | Performed by: INTERNAL MEDICINE

## 2023-03-21 PROCEDURE — 90686 IIV4 VACC NO PRSV 0.5 ML IM: CPT | Performed by: INTERNAL MEDICINE

## 2023-03-21 PROCEDURE — 82306 VITAMIN D 25 HYDROXY: CPT | Performed by: INTERNAL MEDICINE

## 2023-03-21 PROCEDURE — 99396 PREV VISIT EST AGE 40-64: CPT | Mod: 25 | Performed by: INTERNAL MEDICINE

## 2023-03-21 ASSESSMENT — ENCOUNTER SYMPTOMS
CONSTIPATION: 0
NAUSEA: 0
HEMATURIA: 0
PARESTHESIAS: 0
DIZZINESS: 0
PALPITATIONS: 0
SORE THROAT: 0
ABDOMINAL PAIN: 0
CHILLS: 0
JOINT SWELLING: 0
MYALGIAS: 0
HEADACHES: 0
WEAKNESS: 0
ARTHRALGIAS: 0
NERVOUS/ANXIOUS: 0
DIARRHEA: 0
SHORTNESS OF BREATH: 0
DYSURIA: 0
COUGH: 0
HEMATOCHEZIA: 0
FEVER: 0
HEARTBURN: 0
EYE PAIN: 0
FREQUENCY: 0
BREAST MASS: 0

## 2023-03-21 ASSESSMENT — PAIN SCALES - GENERAL: PAINLEVEL: NO PAIN (0)

## 2023-03-21 NOTE — PROGRESS NOTES
SUBJECTIVE:   CC: Carlos is an 47 year old who presents for preventive health visit.     Patient has been advised of split billing requirements and indicates understanding: Yes  Healthy Habits:     Getting at least 3 servings of Calcium per day:  NO    Bi-annual eye exam:  Yes    Dental care twice a year:  NO    Sleep apnea or symptoms of sleep apnea:  None    Diet:  Regular (no restrictions)    Frequency of exercise:  None    Taking medications regularly:  Yes    Medication side effects:  None    PHQ-2 Total Score: 0    Additional concerns today:  Yes    Today's PHQ-2 Score:   PHQ-2 ( 1999 [employer]) 3/18/2023   Q1: Little interest or pleasure in doing things 0   Q2: Feeling down, depressed or hopeless 0   PHQ-2 Score 0   PHQ-2 Total Score (12-17 Years)- Positive if 3 or more points; Administer PHQ-A if positive -   Q1: Little interest or pleasure in doing things Not at all   Q2: Feeling down, depressed or hopeless Not at all   PHQ-2 Score 0           Social History     Tobacco Use     Smoking status: Never     Smokeless tobacco: Never   Substance Use Topics     Alcohol use: Yes     Comment: Occ         Alcohol Use 3/18/2023   Prescreen: >3 drinks/day or >7 drinks/week? No   No flowsheet data found.    Reviewed orders with patient.  Reviewed health maintenance and updated orders accordingly - Yes  Lab work is in process  Labs reviewed in EPIC    Breast Cancer Screening:    FHS-7:   Breast CA Risk Assessment (FHS-7) 11/11/2021 4/18/2022 8/8/2022   Did any of your first-degree relatives have breast or ovarian cancer? No No No   Did any of your relatives have bilateral breast cancer? No No No   Did any man in your family have breast cancer? No No No   Did any woman in your family have breast and ovarian cancer? No No No   Did any woman in your family have breast cancer before age 50 y? No No No   Do you have 2 or more relatives with breast and/or ovarian cancer? No No No   Do you have 2 or more relatives with breast  and/or bowel cancer? Yes No Yes     click delete button to remove this line now  Mammogram Screening: Recommended annual mammography  Pertinent mammograms are reviewed under the imaging tab.    History of abnormal Pap smear: NO - age 30- 65 PAP every 3 years recommended  PAP / HPV Latest Ref Rng & Units 2020   PAP (Historical) - NIL NIL NIL   HPV16 NEG:Negative Negative Negative -   HPV18 NEG:Negative Negative Negative -   HRHPV NEG:Negative Negative Negative -     Reviewed and updated as needed this visit by clinical staff   Tobacco  Allergies  Meds  Problems  Med Hx  Surg Hx  Fam Hx          Reviewed and updated as needed this visit by Provider   Tobacco  Allergies  Meds  Problems  Med Hx  Surg Hx  Fam Hx         History reviewed. No pertinent past medical history.   Past Surgical History:   Procedure Laterality Date     ABDOMEN SURGERY  May 1996, 2001    Ceasection     BACK SURGERY      mayte placement for scoliosis     COLONOSCOPY N/A 2021    Procedure: COLONOSCOPY, WITH POLYPECTOMY AND BIOPSY;  Surgeon: Delma Keane MD;  Location:  GI     CYSTOSCOPY N/A 2023    Procedure: CYSTOSCOPY;  Surgeon: Charlotte Irvin MD;  Location:  OR     CYSTOSCOPY, General  2023     DAVINCI HYSTERECTOMY TOTAL, BILATERAL SALPINGO-OOPHORECTOMY, COMBINED Bilateral 2023    Procedure: Robotic removal of uterus, cervix, both ovaries and fallopian tubes;  Surgeon: Charlotte Irvin MD;  Location:  OR     GYN SURGERY            PROCTOSCOPY  2023     PROCTOSCOPY N/A 2023    Procedure: Proctoscopy;  Surgeon: Charlotte Irvin MD;  Location:  OR     Robotic removal of uterus, cervix, both ovaries and fallopian tubes - Bilateral  2023     OB History    Para Term  AB Living   3 1 1 0 1 2   SAB IAB Ectopic Multiple Live Births   0 0 0 0 0      # Outcome Date GA Lbr Fausto/2nd Weight Sex Delivery Anes  "PTL Lv   3       CS-LTranv      2 Term      CS-LTranv      1 AB                Review of Systems   Constitutional: Negative for chills and fever.   HENT: Negative for congestion, ear pain, hearing loss and sore throat.    Eyes: Negative for pain and visual disturbance.   Respiratory: Negative for cough and shortness of breath.    Cardiovascular: Negative for chest pain, palpitations and peripheral edema.   Gastrointestinal: Negative for abdominal pain, constipation, diarrhea, heartburn, hematochezia and nausea.   Breasts:  Negative for tenderness, breast mass and discharge.   Genitourinary: Negative for dysuria, frequency, genital sores, hematuria, pelvic pain, urgency, vaginal bleeding and vaginal discharge.   Musculoskeletal: Negative for arthralgias, joint swelling and myalgias.   Skin: Negative for rash.   Neurological: Negative for dizziness, weakness, headaches and paresthesias.   Psychiatric/Behavioral: Negative for mood changes. The patient is not nervous/anxious.      CONSTITUTIONAL: NEGATIVE for fever, chills, change in weight  INTEGUMENTARY/SKIN: NEGATIVE for worrisome rashes, moles or lesions  EYES: NEGATIVE for vision changes or irritation  ENT: NEGATIVE for ear, mouth and throat problems  RESP: NEGATIVE for significant cough or SOB  BREAST: NEGATIVE for masses, tenderness or discharge  CV: NEGATIVE for chest pain, palpitations or peripheral edema  GI: NEGATIVE for nausea, abdominal pain, heartburn, or change in bowel habits  : NEGATIVE for unusual urinary or vaginal symptoms. No vaginal bleeding.  MUSCULOSKELETAL: NEGATIVE for significant arthralgias or myalgia  NEURO: NEGATIVE for weakness, dizziness or paresthesias  PSYCHIATRIC: NEGATIVE for changes in mood or affect      OBJECTIVE:   /68   Pulse 65   Temp 97  F (36.1  C) (Tympanic)   Ht 1.475 m (4' 10.07\")   Wt 40.4 kg (89 lb)   LMP 2023   SpO2 99%   BMI 18.56 kg/m    Physical Exam  GENERAL APPEARANCE: healthy, alert and " no distress  EYES: Eyes grossly normal to inspection, PERRL and conjunctivae and sclerae normal  HENT: ear canals and TM's normal, nose and mouth without ulcers or lesions, oropharynx clear and oral mucous membranes moist  NECK: no adenopathy, no asymmetry, masses, or scars and thyroid normal to palpation  RESP: lungs clear to auscultation - no rales, rhonchi or wheezes  BREAST: Defered , Mammogram  CV: regular rate and rhythm, normal S1 S2, no S3 or S4, no murmur, click or rub, no peripheral edema and peripheral pulses strong  ABDOMEN: soft, nontender, no hepatosplenomegaly, no masses and bowel sounds normal  MS: no musculoskeletal defects are noted and gait is age appropriate without ataxia  SKIN: no suspicious lesions or rashes  NEURO: Normal strength and tone, sensory exam grossly normal, mentation intact and speech normal  PSYCH: mentation appears normal and affect normal/bright    Diagnostic Test Results:  Labs reviewed in Epic    ASSESSMENT/PLAN:       Assessment and Plan  1. Routine general medical examination at a health care facility  Last seen pt on August 2022 for preoperative clearance of hysterectomy due to fibroid uterus, which she underwent on January 19, 2023.  Reviewed the pathology report which is showing benign findings.  Patient is here for annual physical, not on any medications at this time.  Last labs in January 2023 showing normal CMP, CBC.  Last lipid panel in November 2021 showing dyslipidemia.  We will recheck due labs including vitamin D at this time.  - INFLUENZA VACCINE IM > 6 MONTHS VALENT IIV4 (AFLURIA/FLUZONE)  - REVIEW OF HEALTH MAINTENANCE PROTOCOL ORDERS  - MA SCREENING DIGITAL BILAT - Future  (s+30); Future  - Lipid panel reflex to direct LDL Fasting; Future  - Vitamin D deficiency screening; Future    2. Intramural leiomyoma of uterus S/P Hysterectomy and bilateral Salpingo OOphorectomy   S/p hysterectomy and bilateral salpingo-oophorectomy which was robotic in January 2023.   Patient denies any symptoms at this time, physical exam showing well-healed surgical scars.  Since ovaries were removed patient was instructed to inform us for any menopausal symptoms and need of HRT at that time.  Patient understood and agreed with the plan.    3. Dyslipidemia  - Lipid panel reflex to direct LDL Fasting; Future    4. History of vitamin D deficiency  - Vitamin D deficiency screening; Future    5. Visit for screening mammogram  - MA SCREENING DIGITAL BILAT - Future  (s+30); Future       Patient Instructions   As discussed , will do fasting labs due at this time. Since few of them done in 1/2023 - will not repeat.       ===============================    Preventive Health Recommendations  Female Ages 40 to 49    Yearly exam:     See your health care provider every year in order to  1. Review health changes.   2. Discuss preventive care.    3. Review your medicines if your doctor prescribed any.      Get a Pap test every three years (unless you have an abnormal result and your provider advises testing more often).      If you get Pap tests with HPV test, you only need to test every 5 years, unless you have an abnormal result. You do not need a Pap test if your uterus was removed (hysterectomy) and you have not had cancer.      You should be tested each year for STDs (sexually transmitted diseases), if you're at risk.     Ask your doctor if you should have a mammogram.      Have a colonoscopy (test for colon cancer) if someone in your family has had colon cancer or polyps before age 50.       Have a cholesterol test every 5 years.       Have a diabetes test (fasting glucose) after age 45. If you are at risk for diabetes, you should have this test every 3 years.    Shots: Get a flu shot each year. Get a tetanus shot every 10 years.     Nutrition:     Eat at least 5 servings of fruits and vegetables each day.    Eat whole-grain bread, whole-wheat pasta and brown rice instead of white grains and  rice.    Get adequate Calcium and Vitamin D.      Lifestyle    Exercise at least 150 minutes a week (an average of 30 minutes a day, 5 days a week). This will help you control your weight and prevent disease.    Limit alcohol to one drink per day.    No smoking.     Wear sunscreen to prevent skin cancer.    See your dentist every six months for an exam and cleaning.    Return in about 1 year (around 3/21/2024), or if symptoms worsen or fail to improve, for Preventative Visit.    Alexandra Mayorga MD  [facility]      Patient has been advised of split billing requirements and indicates understanding: Yes      COUNSELING:  Reviewed preventive health counseling, as reflected in patient instructions  Special attention given to:        Regular exercise       Healthy diet/nutrition       Vision screening       Hearing screening       Immunizations    Vaccinated for: Influenza and Declined: Covid-19 due to Concerns about side effects/safety               Osteoporosis prevention/bone health       (Vivian)menopause management        She reports that she has never smoked. She has never used smokeless tobacco.      Alexandra Mayorga MD  [facility]

## 2023-03-21 NOTE — PATIENT INSTRUCTIONS
As discussed , will do fasting labs due at this time. Since few of them done in 1/2023 - will not repeat.       ===============================    Preventive Health Recommendations  Female Ages 40 to 49    Yearly exam:   See your health care provider every year in order to  Review health changes.   Discuss preventive care.    Review your medicines if your doctor prescribed any.    Get a Pap test every three years (unless you have an abnormal result and your provider advises testing more often).    If you get Pap tests with HPV test, you only need to test every 5 years, unless you have an abnormal result. You do not need a Pap test if your uterus was removed (hysterectomy) and you have not had cancer.    You should be tested each year for STDs (sexually transmitted diseases), if you're at risk.   Ask your doctor if you should have a mammogram.    Have a colonoscopy (test for colon cancer) if someone in your family has had colon cancer or polyps before age 50.     Have a cholesterol test every 5 years.     Have a diabetes test (fasting glucose) after age 45. If you are at risk for diabetes, you should have this test every 3 years.    Shots: Get a flu shot each year. Get a tetanus shot every 10 years.     Nutrition:   Eat at least 5 servings of fruits and vegetables each day.  Eat whole-grain bread, whole-wheat pasta and brown rice instead of white grains and rice.  Get adequate Calcium and Vitamin D.      Lifestyle  Exercise at least 150 minutes a week (an average of 30 minutes a day, 5 days a week). This will help you control your weight and prevent disease.  Limit alcohol to one drink per day.  No smoking.   Wear sunscreen to prevent skin cancer.  See your dentist every six months for an exam and cleaning.

## 2023-03-22 ENCOUNTER — TELEPHONE (OUTPATIENT)
Dept: FAMILY MEDICINE | Facility: CLINIC | Age: 48
End: 2023-03-22
Payer: COMMERCIAL

## 2023-03-22 NOTE — LETTER
March 27, 2023      Carlos Eng  [address]  [place] [address]        Dear ,    We are writing to inform you of your test results.    {results letter list:[id number]}    Resulted Orders   Lipid panel reflex to direct LDL Fasting   Result Value Ref Range    Cholesterol 214 (H) <200 mg/dL    Triglycerides 75 <150 mg/dL    Direct Measure HDL 65 >=50 mg/dL    LDL Cholesterol Calculated 134 (H) <=100 mg/dL    Non HDL Cholesterol 149 (H) <130 mg/dL    Narrative    Cholesterol  Desirable:  <200 mg/dL    Triglycerides  Normal:  Less than 150 mg/dL  Borderline High:  150-199 mg/dL  High:  200-499 mg/dL  Very High:  Greater than or equal to 500 mg/dL    Direct Measure HDL  Female:  Greater than or equal to 50 mg/dL   Male:  Greater than or equal to 40 mg/dL    LDL Cholesterol  Desirable:  <100mg/dL  Above Desirable:  100-129 mg/dL   Borderline High:  130-159 mg/dL   High:  160-189 mg/dL   Very High:  >= 190 mg/dL    Non HDL Cholesterol  Desirable:  130 mg/dL  Above Desirable:  130-159 mg/dL  Borderline High:  160-189 mg/dL  High:  190-219 mg/dL  Very High:  Greater than or equal to 220 mg/dL   Vitamin D deficiency screening   Result Value Ref Range    Vitamin D, Total (25-Hydroxy) 14 (L) 20 - 75 ug/L    Narrative    Season, race, dietary intake, and treatment affect the concentration of 25-hydroxy-Vitamin D. Values may decrease during winter months and increase during summer months. Values 20-29 ug/L may indicate Vitamin D insufficiency and values <20 ug/L may indicate Vitamin D deficiency.    Vitamin D determination is routinely performed by an immunoassay specific for 25 hydroxyvitamin D3.  If an individual is on vitamin D2(ergocalciferol) supplementation, please specify 25 OH vitamin D2 and D3 level determination by LCMSMS test VITD23.         If you have any questions or concerns, please call the clinic at the number listed above.       Sincerely,

## 2023-03-22 NOTE — TELEPHONE ENCOUNTER
----- Message from Alexandra Mayorga MD sent at 3/21/2023 10:58 PM CDT -----  Your Cholesterol remaining mildly high. Given your age and no cardiac risk factors , recommend dietery management of avoiding high fat foods and red meat.    Your Vitamin D levels are abnormally low, Sent high dose Vitamin D 50,000 units once a week to your pharmacy for 3 months. After you complete the 3 months course resume back to Over the Counter Vitamin D 2000 units daily.    Please let me know if you have any questions.  Alexandra Mayorga MD on 3/21/2023   [facility]

## 2023-03-22 NOTE — RESULT ENCOUNTER NOTE
Your Cholesterol remaining mildly high. Given your age and no cardiac risk factors , recommend dietery management of avoiding high fat foods and red meat.    Your Vitamin D levels are abnormally low, Sent high dose Vitamin D 50,000 units once a week to your pharmacy for 3 months. After you complete the 3 months course resume back to Over the Counter Vitamin D 2000 units daily.    Please let me know if you have any questions.  Alexandra Mayorga MD on 3/21/2023   [facility]

## 2023-03-22 NOTE — TELEPHONE ENCOUNTER
Patient Contact    Attempt # 1    Was call answered?  No.  Left message with assistance from [place]  #[phone] on voicemail with information to call triage back at [phone], option 2.     On call back:     PCP result note below.     Aissatou TREJO RN  [facility] Triage Team

## 2023-03-23 NOTE — TELEPHONE ENCOUNTER
Call attempt #2.     Left message for patein to call back and speak with triage.      Rubi Mendoza RN  [facility]

## 2023-03-27 NOTE — TELEPHONE ENCOUNTER
Patient Contact    Attempt # 3-     Was call answered?  No.  Left message on voicemail with information to call triage back at [phone], option 2.     On call back: PCP result note before.    Unsuccessful 3rd attempt. Routing to PCP for review and to  close encounter. Lab results printed and mailed out to pt.    Aissatou TREJO RN  [facility] Triage Team

## 2023-05-18 ENCOUNTER — ANCILLARY PROCEDURE (OUTPATIENT)
Dept: MAMMOGRAPHY | Facility: CLINIC | Age: 48
End: 2023-05-18
Attending: INTERNAL MEDICINE
Payer: COMMERCIAL

## 2023-05-18 DIAGNOSIS — Z12.31 VISIT FOR SCREENING MAMMOGRAM: ICD-10-CM

## 2023-05-18 PROCEDURE — 77067 SCR MAMMO BI INCL CAD: CPT | Mod: TC | Performed by: RADIOLOGY

## 2023-05-18 PROCEDURE — 77063 BREAST TOMOSYNTHESIS BI: CPT | Mod: TC | Performed by: RADIOLOGY

## 2023-08-21 ENCOUNTER — MYC MEDICAL ADVICE (OUTPATIENT)
Dept: ONCOLOGY | Facility: CLINIC | Age: 48
End: 2023-08-21
Payer: COMMERCIAL

## 2023-10-05 ENCOUNTER — OFFICE VISIT (OUTPATIENT)
Dept: FAMILY MEDICINE | Facility: CLINIC | Age: 48
End: 2023-10-05
Payer: COMMERCIAL

## 2023-10-05 VITALS
SYSTOLIC BLOOD PRESSURE: 98 MMHG | WEIGHT: 92 LBS | RESPIRATION RATE: 16 BRPM | DIASTOLIC BLOOD PRESSURE: 70 MMHG | HEART RATE: 67 BPM | TEMPERATURE: 97.5 F | BODY MASS INDEX: 19.18 KG/M2 | OXYGEN SATURATION: 100 %

## 2023-10-05 DIAGNOSIS — M25.561 PAIN IN BOTH KNEES, UNSPECIFIED CHRONICITY: ICD-10-CM

## 2023-10-05 DIAGNOSIS — M25.50 MULTIPLE JOINT PAIN: Primary | ICD-10-CM

## 2023-10-05 DIAGNOSIS — M25.512 BILATERAL SHOULDER PAIN, UNSPECIFIED CHRONICITY: ICD-10-CM

## 2023-10-05 DIAGNOSIS — M25.511 BILATERAL SHOULDER PAIN, UNSPECIFIED CHRONICITY: ICD-10-CM

## 2023-10-05 DIAGNOSIS — M25.562 PAIN IN BOTH KNEES, UNSPECIFIED CHRONICITY: ICD-10-CM

## 2023-10-05 DIAGNOSIS — M25.551 HIP PAIN, RIGHT: ICD-10-CM

## 2023-10-05 PROCEDURE — 99214 OFFICE O/P EST MOD 30 MIN: CPT | Mod: 25 | Performed by: INTERNAL MEDICINE

## 2023-10-05 PROCEDURE — 90686 IIV4 VACC NO PRSV 0.5 ML IM: CPT | Performed by: INTERNAL MEDICINE

## 2023-10-05 PROCEDURE — 90471 IMMUNIZATION ADMIN: CPT | Performed by: INTERNAL MEDICINE

## 2023-10-05 ASSESSMENT — PAIN SCALES - GENERAL: PAINLEVEL: NO PAIN (0)

## 2023-10-05 NOTE — PATIENT INSTRUCTIONS
As discussed , your symptoms appear as mostly muscle toning needed to avoid the clicking or rubbing of the joints causing your symptoms.     Referral to to physical therapy placed for improvement . OK to take as needed Tylenol OTC for symptoms.

## 2023-10-05 NOTE — PROGRESS NOTES
Assessment and Plan  1. Multiple joint pain  New problem, as mentioned below on the various joints which patient states possibly related to her work style, I do not see any acute needs of imaging at this time possibly could be calcific tendinitis cannot be excluded.  Please see the physical exam below for further details.  Shared decision to place physical therapy referral at this time before considering imaging of x-rays.  Patient understood and agreed with plan.  - Physical Therapy Referral; Future    2. Bilateral shoulder pain, unspecified chronicity  New problem, physical exam negative for any tenderness on palpation of the shoulder joints bony prominences, mild discomfort on abduction overhead.  No restriction of range of movements in all directions.  - Physical Therapy Referral; Future    3. Hip pain, right  New problem, physical exam negative for any tenderness on palpation of the Rt joints bony prominences, No restriction of range of movements in all directions.  - Physical Therapy Referral; Future    4. Pain in both knees, unspecified chronicity  New problem, physical exam negative for any tenderness on palpation of the bony prominences. No restriction of range of movements in all directions.  - Physical Therapy Referral; Future         Please note that this note consists of symbols derived from keyboarding, dictation and/or voice recognition software. As a result, there may be errors in the script that have gone undetected. Please consider this when interpreting information found in this chart.    Patient Instructions   As discussed , your symptoms appear as mostly muscle toning needed to avoid the clicking or rubbing of the joints causing your symptoms.     Referral to to physical therapy placed for improvement . OK to take as needed Tylenol OTC for symptoms.       Return in about 4 weeks (around 11/2/2023), or if symptoms worsen or fail to improve, for Follow up of last visit, If symptoms persist, video  visit.    Alexandra Mayorga MD  [facility] FRANK Gonzalez is a 47 year old, presenting for the following health issues:  Musculoskeletal Problem (Joint pains in shoulders, knees and lower back pain. She often hears her joints popping out of place. Family history of Osteoporosis.  )        10/5/2023     3:39 PM   Additional Questions   Roomed by Elizabeth WINTER       History of Present Illness       Reason for visit:  Muscle and joint pain  Symptom onset:  More than a month  Symptoms include:  Joints making popping noise  Symptom intensity:  Mild  Symptom progression:  Staying the same  Had these symptoms before:  No    She eats 2-3 servings of fruits and vegetables daily.She consumes 1 sweetened beverage(s) daily.She exercises with enough effort to increase her heart rate 9 or less minutes per day.  She exercises with enough effort to increase her heart rate 3 or less days per week. She is missing 1 dose(s) of medications per week.  She is not taking prescribed medications regularly due to other.     Last seen patient in March 2023 for annual physical at that time, patient is here for concerns of musculoskeletal issues and joint pains.  Patient does have risk factors of multiple fibroids status post laparoscopic hysterectomy in January 2023       No Known Allergies     History reviewed. No pertinent past medical history.    Past Surgical History:   Procedure Laterality Date    ABDOMEN SURGERY  May 1996, 2001    Ceasection    BACK SURGERY      mayte placement for scoliosis    COLONOSCOPY N/A 02/12/2021    Procedure: COLONOSCOPY, WITH POLYPECTOMY AND BIOPSY;  Surgeon: Delma Keane MD;  Location:  GI    CYSTOSCOPY N/A 01/19/2023    Procedure: CYSTOSCOPY;  Surgeon: Charlotte Irvin MD;  Location: [facility] OR    CYSTOSCOPY, General  01/19/2023    DAVINCI HYSTERECTOMY TOTAL, BILATERAL SALPINGO-OOPHORECTOMY, COMBINED Bilateral 01/19/2023    Procedure: Robotic removal of uterus,  cervix, both ovaries and fallopian tubes;  Surgeon: Charlotte Irvin MD;  Location: [facility] OR    GYN SURGERY  ,         PROCTOSCOPY  2023    PROCTOSCOPY N/A 2023    Procedure: Proctoscopy;  Surgeon: Charlotte Irvin MD;  Location: [facility] OR    Robotic removal of uterus, cervix, both ovaries and fallopian tubes - Bilateral  2023       Family History   Problem Relation Age of Onset    Diabetes Mother     Endocrine Disease Mother     Thyroid Disease Mother     Lung Cancer Father     Other Cancer Father     Colon Cancer Sister     Cerebrovascular Disease Maternal Grandmother     Diabetes Maternal Uncle     Cerebrovascular Disease Maternal Uncle     Hypertension Brother     Hyperlipidemia Brother     Cerebrovascular Disease Other         Mom s Brother    Colon Cancer Sister     Depression Daughter     Anxiety Disorder Daughter     Coronary Artery Disease No family hx of     Breast Cancer No family hx of     Prostate Cancer No family hx of     Mental Illness No family hx of     Substance Abuse No family hx of     Anesthesia Reaction No family hx of     Asthma No family hx of     Osteoporosis No family hx of     Genetic Disorder No family hx of     Obesity No family hx of     Unknown/Adopted No family hx of        Social History     Tobacco Use    Smoking status: Never    Smokeless tobacco: Never   Substance Use Topics    Alcohol use: Yes     Comment: Occ        Current Outpatient Medications   Medication    acetaminophen (TYLENOL) 325 MG tablet    cholecalciferol 5000 UNITS CAPS    ibuprofen (ADVIL/MOTRIN) 800 MG tablet     No current facility-administered medications for this visit.        Review of Systems   Constitutional, HEENT, cardiovascular, pulmonary, GI, , musculoskeletal, neuro, skin, endocrine and psych systems are negative, except as otherwise noted.      Objective    BP 98/70   Pulse 67   Temp 97.5  F (36.4  C) (Tympanic)   Resp 16   Wt 41.7 kg (92 lb)   LMP  01/02/2023   SpO2 100%   BMI 19.18 kg/m    Body mass index is 19.18 kg/m .  Physical Exam   GENERAL: healthy, alert and no distress  NECK: no adenopathy, no asymmetry, masses, or scars and thyroid normal to palpation  RESP: lungs clear to auscultation - no rales, rhonchi or wheezes  CV: regular rate and rhythm, normal S1 S2, no S3 or S4, no murmur, click or rub, no peripheral edema and peripheral pulses strong  ABDOMEN: soft, nontender, no hepatosplenomegaly, no masses and bowel sounds normal  MS: no gross musculoskeletal defects noted, no edema  BILATERAL SHOULDER EXAM : negative for any tenderness on palpation of the shoulder joints bony prominences, mild discomfort on abduction overhead.  No restriction of range of movements in all directions.    RT HIP EXAM : negative for any tenderness on palpation of the bony prominences. No restriction of range of movements in all directions.    KNEE EXAM : negative for any tenderness on palpation of the bony prominences. No restriction of range of movements in all directions.

## 2024-04-04 SDOH — HEALTH STABILITY: PHYSICAL HEALTH: ON AVERAGE, HOW MANY DAYS PER WEEK DO YOU ENGAGE IN MODERATE TO STRENUOUS EXERCISE (LIKE A BRISK WALK)?: 5 DAYS

## 2024-04-04 SDOH — HEALTH STABILITY: PHYSICAL HEALTH: ON AVERAGE, HOW MANY MINUTES DO YOU ENGAGE IN EXERCISE AT THIS LEVEL?: 20 MIN

## 2024-04-04 ASSESSMENT — SOCIAL DETERMINANTS OF HEALTH (SDOH): HOW OFTEN DO YOU GET TOGETHER WITH FRIENDS OR RELATIVES?: ONCE A WEEK

## 2024-04-05 ENCOUNTER — OFFICE VISIT (OUTPATIENT)
Dept: FAMILY MEDICINE | Facility: CLINIC | Age: 49
End: 2024-04-05
Payer: COMMERCIAL

## 2024-04-05 VITALS
RESPIRATION RATE: 13 BRPM | WEIGHT: 90 LBS | DIASTOLIC BLOOD PRESSURE: 68 MMHG | BODY MASS INDEX: 18.89 KG/M2 | TEMPERATURE: 97 F | OXYGEN SATURATION: 100 % | HEART RATE: 64 BPM | HEIGHT: 58 IN | SYSTOLIC BLOOD PRESSURE: 96 MMHG

## 2024-04-05 DIAGNOSIS — R31.9 URINARY TRACT INFECTION WITH HEMATURIA, SITE UNSPECIFIED: ICD-10-CM

## 2024-04-05 DIAGNOSIS — E78.00 HYPERCHOLESTEROLEMIA: ICD-10-CM

## 2024-04-05 DIAGNOSIS — R19.7 DIARRHEA, UNSPECIFIED TYPE: ICD-10-CM

## 2024-04-05 DIAGNOSIS — Z00.00 ROUTINE GENERAL MEDICAL EXAMINATION AT A HEALTH CARE FACILITY: Primary | ICD-10-CM

## 2024-04-05 DIAGNOSIS — A04.8 HELICOBACTER PYLORI STOOL TEST POSITIVE: ICD-10-CM

## 2024-04-05 DIAGNOSIS — Z23 NEED FOR VACCINATION: ICD-10-CM

## 2024-04-05 DIAGNOSIS — Z80.0 FAMILY HISTORY OF COLON CANCER: ICD-10-CM

## 2024-04-05 DIAGNOSIS — R10.30 LOWER ABDOMINAL PAIN: ICD-10-CM

## 2024-04-05 DIAGNOSIS — E55.9 VITAMIN D DEFICIENCY: ICD-10-CM

## 2024-04-05 DIAGNOSIS — K21.9 GASTROESOPHAGEAL REFLUX DISEASE, UNSPECIFIED WHETHER ESOPHAGITIS PRESENT: ICD-10-CM

## 2024-04-05 DIAGNOSIS — N39.0 URINARY TRACT INFECTION WITH HEMATURIA, SITE UNSPECIFIED: ICD-10-CM

## 2024-04-05 LAB
ALBUMIN SERPL BCG-MCNC: 4.7 G/DL (ref 3.5–5.2)
ALBUMIN UR-MCNC: NEGATIVE MG/DL
ALP SERPL-CCNC: 66 U/L (ref 40–150)
ALT SERPL W P-5'-P-CCNC: 27 U/L (ref 0–50)
ANION GAP SERPL CALCULATED.3IONS-SCNC: 11 MMOL/L (ref 7–15)
APPEARANCE UR: CLEAR
AST SERPL W P-5'-P-CCNC: 38 U/L (ref 0–45)
BACTERIA #/AREA URNS HPF: ABNORMAL /HPF
BILIRUB SERPL-MCNC: 0.3 MG/DL
BILIRUB UR QL STRIP: NEGATIVE
BUN SERPL-MCNC: 9.6 MG/DL (ref 6–20)
CALCIUM SERPL-MCNC: 10.3 MG/DL (ref 8.6–10)
CHLORIDE SERPL-SCNC: 101 MMOL/L (ref 98–107)
CHOLEST SERPL-MCNC: 226 MG/DL
COLOR UR AUTO: YELLOW
CREAT SERPL-MCNC: 0.66 MG/DL (ref 0.51–0.95)
DEPRECATED HCO3 PLAS-SCNC: 29 MMOL/L (ref 22–29)
EGFRCR SERPLBLD CKD-EPI 2021: >90 ML/MIN/1.73M2
ERYTHROCYTE [DISTWIDTH] IN BLOOD BY AUTOMATED COUNT: 12.2 % (ref 10–15)
FASTING STATUS PATIENT QL REPORTED: YES
GLUCOSE SERPL-MCNC: 72 MG/DL (ref 70–99)
GLUCOSE UR STRIP-MCNC: NEGATIVE MG/DL
HCT VFR BLD AUTO: 41.7 % (ref 35–47)
HDLC SERPL-MCNC: 73 MG/DL
HGB BLD-MCNC: 13.4 G/DL (ref 11.7–15.7)
HGB UR QL STRIP: ABNORMAL
KETONES UR STRIP-MCNC: NEGATIVE MG/DL
LDLC SERPL CALC-MCNC: 136 MG/DL
LEUKOCYTE ESTERASE UR QL STRIP: ABNORMAL
MCH RBC QN AUTO: 30.5 PG (ref 26.5–33)
MCHC RBC AUTO-ENTMCNC: 32.1 G/DL (ref 31.5–36.5)
MCV RBC AUTO: 95 FL (ref 78–100)
NITRATE UR QL: NEGATIVE
NONHDLC SERPL-MCNC: 153 MG/DL
PH UR STRIP: 7 [PH] (ref 5–7)
PLATELET # BLD AUTO: 341 10E3/UL (ref 150–450)
POTASSIUM SERPL-SCNC: 4.8 MMOL/L (ref 3.4–5.3)
PROT SERPL-MCNC: 8 G/DL (ref 6.4–8.3)
RBC # BLD AUTO: 4.39 10E6/UL (ref 3.8–5.2)
RBC #/AREA URNS AUTO: ABNORMAL /HPF
SODIUM SERPL-SCNC: 141 MMOL/L (ref 135–145)
SP GR UR STRIP: 1.01 (ref 1–1.03)
SQUAMOUS #/AREA URNS AUTO: ABNORMAL /LPF
TRIGL SERPL-MCNC: 83 MG/DL
TSH SERPL DL<=0.005 MIU/L-ACNC: 0.97 UIU/ML (ref 0.3–4.2)
UROBILINOGEN UR STRIP-ACNC: 0.2 E.U./DL
VIT D+METAB SERPL-MCNC: 54 NG/ML (ref 20–50)
WBC # BLD AUTO: 5.3 10E3/UL (ref 4–11)
WBC #/AREA URNS AUTO: ABNORMAL /HPF

## 2024-04-05 PROCEDURE — 36415 COLL VENOUS BLD VENIPUNCTURE: CPT | Performed by: INTERNAL MEDICINE

## 2024-04-05 PROCEDURE — 82306 VITAMIN D 25 HYDROXY: CPT | Performed by: INTERNAL MEDICINE

## 2024-04-05 PROCEDURE — 87086 URINE CULTURE/COLONY COUNT: CPT | Performed by: INTERNAL MEDICINE

## 2024-04-05 PROCEDURE — 91320 SARSCV2 VAC 30MCG TRS-SUC IM: CPT | Performed by: INTERNAL MEDICINE

## 2024-04-05 PROCEDURE — 84443 ASSAY THYROID STIM HORMONE: CPT | Performed by: INTERNAL MEDICINE

## 2024-04-05 PROCEDURE — 80061 LIPID PANEL: CPT | Performed by: INTERNAL MEDICINE

## 2024-04-05 PROCEDURE — 99214 OFFICE O/P EST MOD 30 MIN: CPT | Mod: 25 | Performed by: INTERNAL MEDICINE

## 2024-04-05 PROCEDURE — 85027 COMPLETE CBC AUTOMATED: CPT | Performed by: INTERNAL MEDICINE

## 2024-04-05 PROCEDURE — 90480 ADMN SARSCOV2 VAC 1/ONLY CMP: CPT | Performed by: INTERNAL MEDICINE

## 2024-04-05 PROCEDURE — 81001 URINALYSIS AUTO W/SCOPE: CPT | Performed by: INTERNAL MEDICINE

## 2024-04-05 PROCEDURE — 80053 COMPREHEN METABOLIC PANEL: CPT | Performed by: INTERNAL MEDICINE

## 2024-04-05 PROCEDURE — 99396 PREV VISIT EST AGE 40-64: CPT | Performed by: INTERNAL MEDICINE

## 2024-04-05 ASSESSMENT — PAIN SCALES - GENERAL: PAINLEVEL: NO PAIN (0)

## 2024-04-05 NOTE — PATIENT INSTRUCTIONS
As discussed , please do fasting labs placed    Will check needed stool studies before considering GI given the FH of Colon cancer.     ================================  Preventive Care Advice   This is general advice given by our system to help you stay healthy. However, your care team may have specific advice just for you. Please talk to your care team about your preventive care needs.  Nutrition  Eat 5 or more servings of fruits and vegetables each day.  Try wheat bread, brown rice and whole grain pasta (instead of white bread, rice, and pasta).  Get enough calcium and vitamin D. Check the label on foods and aim for 100% of the RDA (recommended daily allowance).  Lifestyle  Exercise at least 150 minutes each week   (30 minutes a day, 5 days a week).  Do muscle strengthening activities 2 days a week. These help control your weight and prevent disease.  No smoking.  Wear sunscreen to prevent skin cancer.  Have a dental exam and cleaning every 6 months.  Yearly exams  See your health care team every year to talk about:  Any changes in your health.  Any medicines your care team has prescribed.  Preventive care, family planning, and ways to prevent chronic diseases.  Shots (vaccines)   HPV shots (up to age 26), if you've never had them before.  Hepatitis B shots (up to age 59), if you've never had them before.  COVID-19 shot: Get this shot when it's due.  Flu shot: Get a flu shot every year.  Tetanus shot: Get a tetanus shot every 10 years.  Pneumococcal, hepatitis A, and RSV shots: Ask your care team if you need these based on your risk.  Shingles shot (for age 50 and up).  General health tests  Diabetes screening:  Starting at age 35, Get screened for diabetes at least every 3 years.  If you are younger than age 35, ask your care team if you should be screened for diabetes.  Cholesterol test: At age 39, start having a cholesterol test every 5 years, or more often if advised.  Bone density scan (DEXA): At age 50, ask  your care team if you should have this scan for osteoporosis (brittle bones).  Hepatitis C: Get tested at least once in your life.  STIs (sexually transmitted infections)  Before age 24: Ask your care team if you should be screened for STIs.  After age 24: Get screened for STIs if you're at risk. You are at risk for STIs (including HIV) if:  You are sexually active with more than one person.  You don't use condoms every time.  You or a partner was diagnosed with a sexually transmitted infection.  If you are at risk for HIV, ask about PrEP medicine to prevent HIV.  Get tested for HIV at least once in your life, whether you are at risk for HIV or not.  Cancer screening tests  Cervical cancer screening: If you have a cervix, begin getting regular cervical cancer screening tests at age 21. Most people who have regular screenings with normal results can stop after age 65. Talk about this with your provider.  Breast cancer scan (mammogram): If you've ever had breasts, begin having regular mammograms starting at age 40. This is a scan to check for breast cancer.  Colon cancer screening: It is important to start screening for colon cancer at age 45.  Have a colonoscopy test every 10 years (or more often if you're at risk) Or, ask your provider about stool tests like a FIT test every year or Cologuard test every 3 years.  To learn more about your testing options, visit: https://[email or web address]/009515.pdf.  For help making a decision, visit: https://bit.ly/ey67083.  Prostate cancer screening test: If you have a prostate and are age 55 to 69, ask your provider if you would benefit from a yearly prostate cancer screening test.  Lung cancer screening: If you are a current or former smoker age 50 to 80, ask your care team if ongoing lung cancer screenings are right for you.  For informational purposes only. Not to replace the advice of your health care provider. Copyright   2023 [employer]. All rights reserved.  Clinically reviewed by the [facility] Transitions Program. [employer] [id number] - REV 01/24.

## 2024-04-05 NOTE — PROGRESS NOTES
Preventive Care Visit  [facility] FRANK Mayorga MD, Internal Medicine  2024        Assessment and Plan  1. Routine general medical examination at a health care facility  Last seen patient in 2023 for multiple joint pains, she is here for annual physical today.  Does have new concerns of lower abdominal pain ongoing diarrhea with spicy foods.  - COVID-19 12+ () (PFIZER)  - Lipid panel reflex to direct LDL Non-fasting; Future  - REVIEW OF HEALTH MAINTENANCE PROTOCOL ORDERS  - Comprehensive metabolic panel (BMP + Alb, Alk Phos, ALT, AST, Total. Bili, TP); Future  - CBC with platelets; Future  - Vitamin D Deficiency; Future  - TSH with free T4 reflex; Future  - PRIMARY CARE FOLLOW-UP SCHEDULING; Future  - Lipid panel reflex to direct LDL Non-fasting  - Comprehensive metabolic panel (BMP + Alb, Alk Phos, ALT, AST, Total. Bili, TP)  - CBC with platelets  - Vitamin D Deficiency  - TSH with free T4 reflex    2. Hypercholesterolemia  - Lipid panel reflex to direct LDL Non-fasting; Future  - Lipid panel reflex to direct LDL Non-fasting    3. Diarrhea, unspecified type  4. Family history of colon cancer  New problem, patient endorses that this started one month back , not related travel . Does endorses 2x/day soft stools . Denies any abdominal pain except when spicy foods she has severe lower abdominal pain. Last Colonoscopy in  good for 5 years.  Does have risk factors of family history of colon cancer in sister who  at 49 years with similar symptoms of diarrhea and blood in stools 1 year before she was diagnosed with colon cancer and   -Differential diagnosis of possible enteric pathogens versus Helicobacter pylori will be excluded before further recommendations of possibly GI referral for sooner colonoscopy given the risk factors.  Will make sure there is no UTI given the suprapubic tenderness on physical exam.  Patient understood and agreed with the  plan  UPDATE - Your diarrhea is positive for Enteropathogenic E.Coli. Recommend conservative management but given the persistent diarrhea , OK to use a short course of antibiotic for improvement.   - Enteric Bacteria and Virus Panel by [facility] Stool; Future  - TSH with free T4 reflex; Future  - Enteric Bacteria and Virus Panel by [facility] Stool  - TSH with free T4 reflex  - azithromycin (ZITHROMAX) 250 MG tablet; Take 1 tablet daily for 3 days for persistent diarrhea  Dispense: 3 tablet; Refill: 0    5. Lower abdominal pain  - Comprehensive metabolic panel (BMP + Alb, Alk Phos, ALT, AST, Total. Bili, TP); Future  - CBC with platelets; Future  - Helicobacter pylori Antigen Stool; Future  - UA with Microscopic reflex to Culture - lab collect; Future  - Comprehensive metabolic panel (BMP + Alb, Alk Phos, ALT, AST, Total. Bili, TP)  - CBC with platelets  - Helicobacter pylori Antigen Stool  - UA with Microscopic reflex to Culture - lab collect    6. Need for vaccination  - COVID-19 12+ (2023-24) (PFIZER)    7. Vitamin D deficiency  - Vitamin D Deficiency; Future  - Vitamin D Deficiency    8. Urinary tract infection with hematuria, site unspecified  New problem, causing lower abdominal above . Sent in antibiotic to pharmacy   - sulfamethoxazole-trimethoprim (BACTRIM DS) 800-160 MG tablet; Take 1 tablet by mouth 2 times daily  Dispense: 14 tablet; Refill: 0    9. Gastroesophageal reflux disease, unspecified whether esophagitis present  10. Helicobacter pylori stool test positive  New problem, with H.Pylori positive. Sent in triple therapy to pharmacy for improvement. Will need follow up in 3 months for further recommendations.   - amoxicillin (AMOXIL) 500 MG capsule; Take 2 capsules (1,000 mg) by mouth 2 times daily for 10 days  Dispense: 40 capsule; Refill: 0  - levofloxacin (LEVAQUIN) 500 MG tablet; Take 1 tablet (500 mg) by mouth daily  Dispense: 10 tablet; Refill: 0  - omeprazole (PRILOSEC) 20 MG DR capsule; Take 1 capsule (20  mg) by mouth 2 times daily for 10 days  Dispense: 20 capsule; Refill: 0           Please note that this note consists of symbols derived from keyboarding, dictation and/or voice recognition software. As a result, there may be errors in the script that have gone undetected. Please consider this when interpreting information found in this chart.    Patient Instructions   As discussed , please do fasting labs placed    Will check needed stool studies before considering GI given the FH of Colon cancer.     ================================  Preventive Care Advice   This is general advice given by our system to help you stay healthy. However, your care team may have specific advice just for you. Please talk to your care team about your preventive care needs.  Nutrition  Eat 5 or more servings of fruits and vegetables each day.  Try wheat bread, brown rice and whole grain pasta (instead of white bread, rice, and pasta).  Get enough calcium and vitamin D. Check the label on foods and aim for 100% of the RDA (recommended daily allowance).  Lifestyle  Exercise at least 150 minutes each week   (30 minutes a day, 5 days a week).  Do muscle strengthening activities 2 days a week. These help control your weight and prevent disease.  No smoking.  Wear sunscreen to prevent skin cancer.  Have a dental exam and cleaning every 6 months.  Yearly exams  See your health care team every year to talk about:  Any changes in your health.  Any medicines your care team has prescribed.  Preventive care, family planning, and ways to prevent chronic diseases.  Shots (vaccines)   HPV shots (up to age 26), if you've never had them before.  Hepatitis B shots (up to age 59), if you've never had them before.  COVID-19 shot: Get this shot when it's due.  Flu shot: Get a flu shot every year.  Tetanus shot: Get a tetanus shot every 10 years.  Pneumococcal, hepatitis A, and RSV shots: Ask your care team if you need these based on your risk.  Shingles shot  (for age 50 and up).  General health tests  Diabetes screening:  Starting at age 35, Get screened for diabetes at least every 3 years.  If you are younger than age 35, ask your care team if you should be screened for diabetes.  Cholesterol test: At age 39, start having a cholesterol test every 5 years, or more often if advised.  Bone density scan (DEXA): At age 50, ask your care team if you should have this scan for osteoporosis (brittle bones).  Hepatitis C: Get tested at least once in your life.  STIs (sexually transmitted infections)  Before age 24: Ask your care team if you should be screened for STIs.  After age 24: Get screened for STIs if you're at risk. You are at risk for STIs (including HIV) if:  You are sexually active with more than one person.  You don't use condoms every time.  You or a partner was diagnosed with a sexually transmitted infection.  If you are at risk for HIV, ask about PrEP medicine to prevent HIV.  Get tested for HIV at least once in your life, whether you are at risk for HIV or not.  Cancer screening tests  Cervical cancer screening: If you have a cervix, begin getting regular cervical cancer screening tests at age 21. Most people who have regular screenings with normal results can stop after age 65. Talk about this with your provider.  Breast cancer scan (mammogram): If you've ever had breasts, begin having regular mammograms starting at age 40. This is a scan to check for breast cancer.  Colon cancer screening: It is important to start screening for colon cancer at age 45.  Have a colonoscopy test every 10 years (or more often if you're at risk) Or, ask your provider about stool tests like a FIT test every year or Cologuard test every 3 years.  To learn more about your testing options, visit: https://www.Knodium/388499.pdf.  For help making a decision, visit: https://bit.ly/nx04428.  Prostate cancer screening test: If you have a prostate and are age 55 to 69, ask your provider  if you would benefit from a yearly prostate cancer screening test.  Lung cancer screening: If you are a current or former smoker age 50 to 80, ask your care team if ongoing lung cancer screenings are right for you.  For informational purposes only. Not to replace the advice of your health care provider. Copyright   2023 [facility]. All rights reserved. Clinically reviewed by the [facility] Transitions Program. [employer]urb[employer] [id number] - REV 01/24.    Return in about 6 months (around 10/5/2024), or if symptoms worsen or fail to improve, for If symptoms persist, Follow up of last visit.    Alexandra Mayorga MD  [facility] FRANK Gonzalez is a 48 year old, presenting for the following:  Physical        4/5/2024     6:53 AM   Additional Questions   Roomed by Tyesha TREJO        Health Care Directive  Patient does not have a Health Care Directive or Living Will:     HPI      4/4/2024   General Health   How would you rate your overall physical health? (!) FAIR         4/4/2024   Nutrition   Three or more servings of calcium each day? (!) NO   Diet: Regular (no restrictions)   How many servings of fruit and vegetables per day? (!) 2-3   How many sweetened beverages each day? (!) 2         4/4/2024   Exercise   Days per week of moderate/strenous exercise 5 days   Average minutes spent exercising at this level 20 min         4/4/2024   Social Factors   Frequency of gathering with friends or relatives Once a week   Worry food won't last until get money to buy more No   Food not last or not have enough money for food? No   Do you have housing?  Yes   Are you worried about losing your housing? No   Lack of transportation? No   Unable to get utilities (heat,electricity)? No         4/4/2024   Dental   Dentist two times every year? (!) NO         4/4/2024   TB Screening   Were you born outside of the [place]? Yes         Today's PHQ-2 Score:       4/4/2024     5:29 PM   PHQ-2 ( 1999  [employer])   Q1: Little interest or pleasure in doing things 0   Q2: Feeling down, depressed or hopeless 0   PHQ-2 Score 0   Q1: Little interest or pleasure in doing things Not at all   Q2: Feeling down, depressed or hopeless Not at all   PHQ-2 Score 0           4/4/2024   Substance Use   Alcohol more than 3/day or more than 7/wk No   Do you use any other substances recreationally? No     Social History     Tobacco Use    Smoking status: Never    Smokeless tobacco: Never   Vaping Use    Vaping status: Never Used   Substance Use Topics    Alcohol use: Yes     Comment: Occ    Drug use: No           5/18/2023   LAST FHS-7 RESULTS   1st degree relative breast or ovarian cancer No   Any relative bilateral breast cancer No   Any male have breast cancer No   Any ONE woman have BOTH breast AND ovarian cancer No   Any woman with breast cancer before 50yrs No   2 or more relatives with breast AND/OR ovarian cancer No   2 or more relatives with breast AND/OR bowel cancer No        Mammogram Screening - Annual screen due to greater than 20% lifetime risk as estimated by Breast Cancer Risk Calculator        4/4/2024   STI Screening   New sexual partner(s) since last STI/HIV test? No     History of abnormal Pap smear: NO - age 30-65 PAP every 5 years with negative HPV co-testing recommended        Latest Ref Rng & Units 9/16/2020     4:51 PM 9/16/2020     4:11 PM 4/22/2016     4:27 PM   PAP / HPV   PAP (Historical)   NIL     HPV 16 DNA NEG^Negative Negative   Negative    HPV 18 DNA NEG^Negative Negative   Negative    Other HR HPV NEG^Negative Negative   Negative      ASCVD Risk   The 10-year ASCVD risk score (Shamar ELLIOTT, et al., 2019) is: 0.5%    Values used to calculate the score:      Age: 48 years      Sex: Female      Is Non- : No      Diabetic: No      Tobacco smoker: No      Systolic Blood Pressure: 96 mmHg      Is BP treated: No      HDL Cholesterol: 73 mg/dL      Total Cholesterol: 226  mg/dL       Reviewed and updated as needed this visit by Provider   Tobacco  Allergies  Meds  Problems  Med Hx  Surg Hx  Fam Hx            History reviewed. No pertinent past medical history.  Past Surgical History:   Procedure Laterality Date    ABDOMEN SURGERY  May 1996, 2001    Ceasection    BACK SURGERY      mayte placement for scoliosis    COLONOSCOPY N/A 2021    Procedure: COLONOSCOPY, WITH POLYPECTOMY AND BIOPSY;  Surgeon: Delma Keane MD;  Location:  GI    CYSTOSCOPY N/A 2023    Procedure: CYSTOSCOPY;  Surgeon: Charlotte Irvin MD;  Location: [facility]    CYSTOSCOPY, General  2023    DAVINCI HYSTERECTOMY TOTAL, BILATERAL SALPINGO-OOPHORECTOMY, COMBINED Bilateral 2023    Procedure: Robotic removal of uterus, cervix, both ovaries and fallopian tubes;  Surgeon: Charlotte Irvin MD;  Location: [facility] OR    GYN SURGERY           PROCTOSCOPY  2023    PROCTOSCOPY N/A 2023    Procedure: Proctoscopy;  Surgeon: Charlotte Irvin MD;  Location: [facility] OR    Robotic removal of uterus, cervix, both ovaries and fallopian tubes - Bilateral  2023     OB History    Para Term  AB Living   3 1 1 0 1 2   SAB IAB Ectopic Multiple Live Births   0 0 0 0 0      # Outcome Date GA Lbr Fausto/2nd Weight Sex Type Anes PTL Lv   3       CS-LTranv      2 Term      CS-LTranv      1 AB              Lab work is in process  Labs reviewed in EPIC  BP Readings from Last 3 Encounters:   24 96/68   10/05/23 98/70   23 102/68    Wt Readings from Last 3 Encounters:   24 40.8 kg (90 lb)   10/05/23 41.7 kg (92 lb)   23 40.4 kg (89 lb)                  Patient Active Problem List   Diagnosis    Family history of diabetes mellitus    External hemorrhoids    Screening for diabetes mellitus    Hypercholesterolemia    Intramural leiomyoma of uterus S/P Hysterectomy and bilateral Salpingo OOphorectomy     Family history of colon  cancer    Sinus bradycardia     Past Surgical History:   Procedure Laterality Date    ABDOMEN SURGERY  May 1996, 2001    Ceasection    BACK SURGERY      mayte placement for scoliosis    COLONOSCOPY N/A 2021    Procedure: COLONOSCOPY, WITH POLYPECTOMY AND BIOPSY;  Surgeon: Delma Keane MD;  Location:  GI    CYSTOSCOPY N/A 2023    Procedure: CYSTOSCOPY;  Surgeon: Charlotte Irvin MD;  Location: [facility]    CYSTOSCOPY, General  2023    DAVINCI HYSTERECTOMY TOTAL, BILATERAL SALPINGO-OOPHORECTOMY, COMBINED Bilateral 2023    Procedure: Robotic removal of uterus, cervix, both ovaries and fallopian tubes;  Surgeon: Charlotte Irvin MD;  Location: [facility]    GYN SURGERY           PROCTOSCOPY  2023    PROCTOSCOPY N/A 2023    Procedure: Proctoscopy;  Surgeon: Charlotte Irvin MD;  Location: [facility]    Robotic removal of uterus, cervix, both ovaries and fallopian tubes - Bilateral  2023       Social History     Tobacco Use    Smoking status: Never    Smokeless tobacco: Never   Substance Use Topics    Alcohol use: Yes     Comment: Occ     Family History   Problem Relation Age of Onset    Diabetes Mother     Endocrine Disease Mother     Thyroid Disease Mother     Lung Cancer Father     Other Cancer Father     Colon Cancer Sister     Cerebrovascular Disease Maternal Grandmother     Diabetes Maternal Uncle     Cerebrovascular Disease Maternal Uncle     Hypertension Brother     Hyperlipidemia Brother     Cerebrovascular Disease Other         Mom s Brother    Colon Cancer Sister     Depression Daughter     Anxiety Disorder Daughter     Coronary Artery Disease No family hx of     Breast Cancer No family hx of     Prostate Cancer No family hx of     Mental Illness No family hx of     Substance Abuse No family hx of     Anesthesia Reaction No family hx of     Asthma No family hx of     Osteoporosis No family hx of     Genetic Disorder No family hx of      "Obesity No family hx of     Unknown/Adopted No family hx of          Current Outpatient Medications   Medication Sig Dispense Refill    azithromycin (ZITHROMAX) 250 MG tablet Take 1 tablet daily for 3 days for persistent diarrhea 3 tablet 0    sulfamethoxazole-trimethoprim (BACTRIM DS) 800-160 MG tablet Take 1 tablet by mouth 2 times daily 14 tablet 0    cholecalciferol 5000 UNITS CAPS Take 1 capsule by mouth daily. (Patient not taking: Reported on 10/5/2023) 90 capsule 0     No Known Allergies  Recent Labs   Lab Test 04/05/24  0751 03/21/23  0741 01/19/23  0551 08/09/22  1109 01/12/22  0804 11/17/21  1052   * 134*  --   --   --  139*   HDL 73 65  --   --   --  74   TRIG 83 75  --   --   --  65   ALT 27  --  13 18  --  25   CR 0.66  --  0.70 0.60   < > 0.61   GFRESTIMATED >90  --  >90 >90   < > >90   POTASSIUM 4.8  --  4.2 4.3   < > 3.7   TSH 0.97  --   --   --   --   --     < > = values in this interval not displayed.          Review of Systems  Constitutional, HEENT, cardiovascular, pulmonary, GI, , musculoskeletal, neuro, skin, endocrine and psych systems are negative, except as otherwise noted.     Objective    Exam  BP 96/68 (BP Location: Left arm, Patient Position: Sitting, Cuff Size: Adult Small)   Pulse 64   Temp 97  F (36.1  C) (Tympanic)   Resp 13   Ht 1.479 m (4' 10.23\")   Wt 40.8 kg (90 lb)   LMP 01/02/2023   SpO2 100%   BMI 18.66 kg/m     Estimated body mass index is 18.66 kg/m  as calculated from the following:    Height as of this encounter: 1.479 m (4' 10.23\").    Weight as of this encounter: 40.8 kg (90 lb).    Physical Exam  GENERAL: alert and no distress  EYES: Eyes grossly normal to inspection, PERRL and conjunctivae and sclerae normal  HENT: ear canals and TM's normal, nose and mouth without ulcers or lesions  NECK: no adenopathy, no asymmetry, masses, or scars  RESP: lungs clear to auscultation - no rales, rhonchi or wheezes  BREAST: Deferred , Mammogram  CV: regular rate and " rhythm, normal S1 S2, no S3 or S4, no murmur, click or rub, no peripheral edema  ABDOMEN: soft, nontender, no hepatosplenomegaly, no masses and bowel sounds normal  MS: no gross musculoskeletal defects noted, no edema  SKIN: no suspicious lesions or rashes  NEURO: Normal strength and tone, mentation intact and speech normal  PSYCH: mentation appears normal, affect normal/bright        Signed Electronically by: Alexandra Mayorga MD

## 2024-04-06 LAB — BACTERIA UR CULT: NORMAL

## 2024-04-07 RX ORDER — SULFAMETHOXAZOLE/TRIMETHOPRIM 800-160 MG
1 TABLET ORAL 2 TIMES DAILY
Qty: 14 TABLET | Refills: 0 | Status: SHIPPED | OUTPATIENT
Start: 2024-04-07 | End: 2024-09-11

## 2024-04-07 NOTE — RESULT ENCOUNTER NOTE
Your Urine exam is positive for UTI. I have sent in antibiotic to your pharmacy. Please start it off ASAP. I will update on your culture results which takes few days. Please let me know if you have any questions.    Your Vitamin D levels are abnormally high causing toxicity . Please hold off any supplements you are taking for 2 weeks and later start only on 1000 units daily.     Your Cholesterol remaining high. Given your age and no cardiac risk factors , recommend dietery management of avoiding high fat foods and red meat .     Please let me know if you have any questions.  Alexandra Mayorga MD on 4/7/2024   [facility]

## 2024-04-15 ENCOUNTER — APPOINTMENT (OUTPATIENT)
Dept: LAB | Facility: CLINIC | Age: 49
End: 2024-04-15
Payer: COMMERCIAL

## 2024-04-15 LAB

## 2024-04-15 PROCEDURE — 87338 HPYLORI STOOL AG IA: CPT | Performed by: INTERNAL MEDICINE

## 2024-04-15 PROCEDURE — 87507 IADNA-DNA/RNA PROBE TQ 12-25: CPT | Performed by: INTERNAL MEDICINE

## 2024-04-16 LAB — H PYLORI AG STL QL IA: POSITIVE

## 2024-04-16 RX ORDER — AZITHROMYCIN 250 MG/1
TABLET, FILM COATED ORAL
Qty: 3 TABLET | Refills: 0 | Status: SHIPPED | OUTPATIENT
Start: 2024-04-16 | End: 2024-09-11

## 2024-04-17 RX ORDER — AMOXICILLIN 500 MG/1
1000 CAPSULE ORAL 2 TIMES DAILY
Qty: 40 CAPSULE | Refills: 0 | Status: SHIPPED | OUTPATIENT
Start: 2024-04-17 | End: 2024-04-27

## 2024-04-17 RX ORDER — LEVOFLOXACIN 500 MG/1
500 TABLET, FILM COATED ORAL DAILY
Qty: 10 TABLET | Refills: 0 | Status: SHIPPED | OUTPATIENT
Start: 2024-04-17 | End: 2024-09-11

## 2024-04-17 NOTE — RESULT ENCOUNTER NOTE
Your H.Pylori test is POSITIVE , I have sent in the medication course to your pharmacy for improvement. You will need to continue the medication Omeprazole along with this.    Please let me know if you have any questions.  Alexandra Mayorga MD

## 2024-04-22 ENCOUNTER — MYC MEDICAL ADVICE (OUTPATIENT)
Dept: FAMILY MEDICINE | Facility: CLINIC | Age: 49
End: 2024-04-22
Payer: COMMERCIAL

## 2024-04-28 ENCOUNTER — OFFICE VISIT (OUTPATIENT)
Dept: URGENT CARE | Facility: URGENT CARE | Age: 49
End: 2024-04-28
Payer: COMMERCIAL

## 2024-04-28 VITALS
TEMPERATURE: 98 F | HEART RATE: 60 BPM | DIASTOLIC BLOOD PRESSURE: 78 MMHG | WEIGHT: 91 LBS | OXYGEN SATURATION: 99 % | SYSTOLIC BLOOD PRESSURE: 117 MMHG | BODY MASS INDEX: 18.87 KG/M2 | RESPIRATION RATE: 18 BRPM

## 2024-04-28 DIAGNOSIS — M77.8 RIGHT ELBOW TENDINITIS: ICD-10-CM

## 2024-04-28 DIAGNOSIS — M77.8 TENDINITIS OF LEFT WRIST: Primary | ICD-10-CM

## 2024-04-28 PROCEDURE — 99213 OFFICE O/P EST LOW 20 MIN: CPT | Performed by: PHYSICIAN ASSISTANT

## 2024-04-28 ASSESSMENT — ENCOUNTER SYMPTOMS: ARTHRALGIAS: 1

## 2024-04-28 NOTE — PROGRESS NOTES
Assessment & Plan        1. Tendinitis of left wrist    -Patient's symptoms are consistent with adverse reaction to fluoroquinolones.  Patient was on Levaquin for 10 days, 11 days ago.  She started experiencing pain in the elbow and wrist 2 days after starting the medication.  -Acetaminophen as needed for pain commended  -Bracing for comfort and support.  Patient was provided with the wrist brace and the tennis elbow band in the clinic.  -Resting recommended  - Wrist/Arm/Hand Bracking Supplies Order Wrist Brace; Left; with thumb spica    2. Right elbow tendinitis    - Wrist/Arm/Hand Bracking Supplies Order Tennis Elbow Arm Band; Right      Patient Instructions   Acetaminophen as needed for pain  Bracing and rest the elbow and wrist may help  Activity as tolerated     Return if symptoms worsen or fail to improve, for Follow up.    At the end of the encounter, I discussed results, diagnosis, medications. Discussed red flags for immediate return to clinic/ER, as well as indications for follow up if no improvement. Patient understood and agreed to plan. Patient was stable for discharge.    Mckenna Gonzalez is a 48 year old female who presents to clinic today with  for the following health issues:  Chief Complaint   Patient presents with    Wrist Pain     Left wrist and right elbow pain for the last week. No hx of trauma. Pain started all of a sudden      HPI    Patient reports left wrist and right elbow pain which started 9 days ago.  No history of trauma or injury.  She describes the pain as burning and soreness.  Patient was on Levaquin 500 mg daily for 10 days, 11 days ago.  Patient stated having the wrist and elbow pain 2 days after starting Levaquin.  Patient completed treatment.  Patient has been messaging the wrist and the elbow without much help.  She has not tried analgesics.      Review of Systems   Musculoskeletal:  Positive for arthralgias.   All other systems reviewed and are negative.      Problem  List:  2022-08: Sinus bradycardia  2019-09: Family history of colon cancer  2017-03: Intramural leiomyoma of uterus S/P Hysterectomy and   bilateral Salpingo OOphorectomy   2017-02: Excessive or frequent menstruation since 10-16  2016-04: Hypercholesterolemia  2016-04: Screening for diabetes mellitus  2013-06: Vitamin D deficiency disease  2013-06: Family history of diabetes mellitus  2013-06: External hemorrhoids      History reviewed. No pertinent past medical history.    Social History     Tobacco Use    Smoking status: Never    Smokeless tobacco: Never   Substance Use Topics    Alcohol use: Yes     Comment: Occ           Objective    /78 (BP Location: Right arm, Patient Position: Sitting, Cuff Size: Adult Regular)   Pulse 60   Temp 98  F (36.7  C) (Oral)   Resp 18   Wt 41.3 kg (91 lb)   LMP 01/02/2023   SpO2 99%   BMI 18.87 kg/m    Physical Exam  Vitals and nursing note reviewed.   Constitutional:       Appearance: Normal appearance.   Cardiovascular:      Rate and Rhythm: Normal rate and regular rhythm.   Pulmonary:      Effort: Pulmonary effort is normal.      Breath sounds: Normal breath sounds.   Musculoskeletal:         General: Normal range of motion.      Right elbow: Tenderness present in lateral epicondyle.      Left wrist: Tenderness present.        Arms:       Cervical back: Normal range of motion and neck supple.   Skin:     General: Skin is warm and dry.      Findings: No rash.   Neurological:      General: No focal deficit present.      Mental Status: She is alert and oriented to person, place, and time.      Sensory: Sensation is intact.      Motor: Motor function is intact.   Psychiatric:         Mood and Affect: Mood normal.         Behavior: Behavior normal.              Tamar Mccormick PA-C

## 2024-04-28 NOTE — PATIENT INSTRUCTIONS
Acetaminophen as needed for pain  Bracing and rest the elbow and wrist may help  Activity as tolerated

## 2024-06-11 ENCOUNTER — ANCILLARY PROCEDURE (OUTPATIENT)
Dept: MAMMOGRAPHY | Facility: CLINIC | Age: 49
End: 2024-06-11
Attending: INTERNAL MEDICINE
Payer: COMMERCIAL

## 2024-06-11 DIAGNOSIS — Z12.31 VISIT FOR SCREENING MAMMOGRAM: ICD-10-CM

## 2024-06-11 PROCEDURE — 77063 BREAST TOMOSYNTHESIS BI: CPT | Mod: TC | Performed by: STUDENT IN AN ORGANIZED HEALTH CARE EDUCATION/TRAINING PROGRAM

## 2024-06-11 PROCEDURE — 77067 SCR MAMMO BI INCL CAD: CPT | Mod: TC | Performed by: STUDENT IN AN ORGANIZED HEALTH CARE EDUCATION/TRAINING PROGRAM

## 2024-09-11 ENCOUNTER — ANCILLARY PROCEDURE (OUTPATIENT)
Dept: GENERAL RADIOLOGY | Facility: CLINIC | Age: 49
End: 2024-09-11
Attending: INTERNAL MEDICINE
Payer: COMMERCIAL

## 2024-09-11 ENCOUNTER — OFFICE VISIT (OUTPATIENT)
Dept: FAMILY MEDICINE | Facility: CLINIC | Age: 49
End: 2024-09-11
Payer: COMMERCIAL

## 2024-09-11 VITALS
RESPIRATION RATE: 18 BRPM | HEART RATE: 78 BPM | OXYGEN SATURATION: 99 % | TEMPERATURE: 97.8 F | WEIGHT: 87 LBS | BODY MASS INDEX: 18.04 KG/M2

## 2024-09-11 DIAGNOSIS — M65.4 DE QUERVAIN'S DISEASE (TENOSYNOVITIS): ICD-10-CM

## 2024-09-11 DIAGNOSIS — M25.532 LEFT WRIST PAIN: ICD-10-CM

## 2024-09-11 DIAGNOSIS — Z23 NEED FOR VACCINATION: ICD-10-CM

## 2024-09-11 DIAGNOSIS — M77.11 RIGHT TENNIS ELBOW: ICD-10-CM

## 2024-09-11 DIAGNOSIS — A04.8 HELICOBACTER PYLORI STOOL TEST POSITIVE: ICD-10-CM

## 2024-09-11 DIAGNOSIS — M25.521 RIGHT ELBOW PAIN: ICD-10-CM

## 2024-09-11 DIAGNOSIS — K21.9 GASTROESOPHAGEAL REFLUX DISEASE, UNSPECIFIED WHETHER ESOPHAGITIS PRESENT: Primary | ICD-10-CM

## 2024-09-11 DIAGNOSIS — R19.7 DIARRHEA, UNSPECIFIED TYPE: ICD-10-CM

## 2024-09-11 PROCEDURE — 99214 OFFICE O/P EST MOD 30 MIN: CPT | Mod: 25 | Performed by: INTERNAL MEDICINE

## 2024-09-11 PROCEDURE — 90471 IMMUNIZATION ADMIN: CPT | Performed by: INTERNAL MEDICINE

## 2024-09-11 PROCEDURE — 73110 X-RAY EXAM OF WRIST: CPT | Mod: TC | Performed by: RADIOLOGY

## 2024-09-11 PROCEDURE — 90656 IIV3 VACC NO PRSV 0.5 ML IM: CPT | Performed by: INTERNAL MEDICINE

## 2024-09-11 PROCEDURE — 73070 X-RAY EXAM OF ELBOW: CPT | Mod: TC | Performed by: RADIOLOGY

## 2024-09-11 RX ORDER — MELOXICAM 7.5 MG/1
7.5 TABLET ORAL DAILY PRN
Qty: 30 TABLET | Refills: 1 | Status: SHIPPED | OUTPATIENT
Start: 2024-09-11

## 2024-09-11 ASSESSMENT — PAIN SCALES - GENERAL: PAINLEVEL: SEVERE PAIN (6)

## 2024-09-11 ASSESSMENT — PATIENT HEALTH QUESTIONNAIRE - PHQ9: SUM OF ALL RESPONSES TO PHQ QUESTIONS 1-9: 0

## 2024-09-11 NOTE — PATIENT INSTRUCTIONS
As discussed, please do the X ray of left wrist and Rt elbow pain before considering physical therapy.     Continue rest , ice, supportive braces for wrist and elbow. Will start on Meloxicam for improvement of pain.     ===============================

## 2024-09-11 NOTE — PROGRESS NOTES
Assessment and Plan  1. Gastroesophageal reflux disease, unspecified whether esophagitis present  2. Helicobacter pylori stool test positive  3. Diarrhea, unspecified type  Resolved at this time with all the work up and treatment done for various bacteria identified on the stool samples . Unfortunately pt states that she has developed the below pains after completion of levofloxacin.     4. Right elbow pain  5. Right tennis elbow  Ongoing problem, Uncontrolled. Pt is pretty active physically at work as well and states that she has been having ongoing pain since 4/2024 UC visit and all the conservative measures she is already following .   - Physical exam positive for mild discomfort on the ROM of right elbow and tenderness on the lateral epicondyle on palpation.   - Differential diagnosis of DJD due to work style and Rt tennis elbow .   - Shared decision to consider X ray, physical therapy and later steroid shot which I have offered to her in the office today.   - Will consider Meloxicam as needed for pain as tylenol has not been helping much. Explained the side effects of medication. Pt understood and agreed with the plan.   - meloxicam (MOBIC) 7.5 MG tablet; Take 1 tablet (7.5 mg) by mouth daily as needed for moderate pain or pain.  Dispense: 30 tablet; Refill: 1  - XR Elbow Right 2 Views; Future    6. Left wrist pain  Ongoing problem, Uncontrolled. Pt is pretty active physically at work as well and states that she has been having ongoing pain since 4/2024  visit and all the conservative measures she is already following .   - Physical exam positive for mild discomfort on the ROM of left wrist and tenderness on radial styloid process on palpation.   - Differential diagnosis of DJD due to work style and De'quervains tendonitis.   - Shared decision to consider X ray, physical therapy and later steroid shot which I have offered to her in the office today.   - Will consider Meloxicam as needed for pain as tylenol  has not been helping much. Explained the side effects of medication. Pt understood and agreed with the plan.   - meloxicam (MOBIC) 7.5 MG tablet; Take 1 tablet (7.5 mg) by mouth daily as needed for moderate pain or pain.  Dispense: 30 tablet; Refill: 1  - XR Wrist Left G/E 3 Views; Future    7. Need for vaccination  - INFLUENZA VACCINE,SPLIT VIRUS,TRIVALENT,PF(FLUZONE)    UPDATE -   4. Right elbow pain  5. Right tennis elbow  X-ray is showing normal, placed referral to physical therapy.  If no improvement will consider need for steroid injection at that time  - Occupational Therapy  Referral; Future    6. Left wrist pain  8. De Quervain's disease (tenosynovitis)  X-ray is showing normal, placed referral to physical therapy.  If no improvement will consider need for steroid injection at that time  - Occupational Therapy  Referral; Future         The longitudinal plan of care for the diagnosis(es)/condition(s) as documented were addressed during this visit. Due to the added complexity in care, I will continue to support Carlos in the subsequent management and with ongoing continuity of care.      Please note that this note consists of symbols derived from keyboarding, dictation and/or voice recognition software. As a result, there may be errors in the script that have gone undetected. Please consider this when interpreting information found in this chart.    Patient Instructions   As discussed, please do the X ray of left wrist and Rt elbow pain before considering physical therapy.     Continue rest , ice, supportive braces for wrist and elbow. Will start on Meloxicam for improvement of pain.     ===============================                Return in about 4 weeks (around 10/9/2024), or if symptoms worsen or fail to improve, for If symptoms persist for possible procedure of steroid shots if no improvement with PT. .    Alexandra Mayorga MD  [facility]    Subjective    Carlos is a 48 year old, presenting for the following health issues:  Wrist Pain        9/11/2024    10:33 AM   Additional Questions   Roomed by Tyesha TREJO     History of Present Illness       Reason for visit:  Pains in left wrist/right elbow and followup from previous appt    She eats 2-3 servings of fruits and vegetables daily.She consumes 2 sweetened beverage(s) daily.She exercises with enough effort to increase her heart rate 9 or less minutes per day.  She exercises with enough effort to increase her heart rate 3 or less days per week.   She is taking medications regularly.       Pt has shooting pain going down arm to wrist , left   Also pain in the right elbow. She was seen at  in April but, still having concerns     Follow up from [facility] and stool sample. No symptoms or concerns     Pain History:  When did you first notice your pain? End of April    Have you seen this provider for your pain in the past? No   Where in your body do your have pain? Left wrist and right elbow  Are you seeing anyone else for your pain? Yes - Janene  4/28/24   What makes your pain better? Massage and tylenol   What makes your pain worse? Sleeping , stiffness  How has pain affected your ability to work? Can work part time without limitations   What type of work do you or did you do?        Last urgent care visit in April 2024 for left wrist tendinitis and also right elbow tendinitis after Levaquin course.  Patient was given management brace, rest,NSAIDs.  She is here for follow-up.     No Known Allergies     History reviewed. No pertinent past medical history.    Past Surgical History:   Procedure Laterality Date    ABDOMEN SURGERY  May 1996, 2001    Ceasection    BACK SURGERY      mayte placement for scoliosis    COLONOSCOPY N/A 02/12/2021    Procedure: COLONOSCOPY, WITH POLYPECTOMY AND BIOPSY;  Surgeon: Delma Keane MD;  Location:  GI    CYSTOSCOPY N/A 01/19/2023    Procedure: CYSTOSCOPY;  Surgeon: Mandy  Charlotte Edwards MD;  Location: [facility] OR    CYSTOSCOPY, General  2023    DAVINCI HYSTERECTOMY TOTAL, BILATERAL SALPINGO-OOPHORECTOMY, COMBINED Bilateral 2023    Procedure: Robotic removal of uterus, cervix, both ovaries and fallopian tubes;  Surgeon: Charlotte Irvin MD;  Location: [facility] OR    GYN SURGERY  ,         PROCTOSCOPY  2023    PROCTOSCOPY N/A 2023    Procedure: Proctoscopy;  Surgeon: Charlotte Irvin MD;  Location: [facility] OR    Robotic removal of uterus, cervix, both ovaries and fallopian tubes - Bilateral  2023       Family History   Problem Relation Age of Onset    Diabetes Mother     Endocrine Disease Mother     Thyroid Disease Mother     Lung Cancer Father     Other Cancer Father     Colon Cancer Sister     Cerebrovascular Disease Maternal Grandmother     Diabetes Maternal Uncle     Cerebrovascular Disease Maternal Uncle     Hypertension Brother     Hyperlipidemia Brother     Cerebrovascular Disease Other         Mom s Brother    Colon Cancer Sister     Depression Daughter     Anxiety Disorder Daughter     Coronary Artery Disease No family hx of     Breast Cancer No family hx of     Prostate Cancer No family hx of     Mental Illness No family hx of     Substance Abuse No family hx of     Anesthesia Reaction No family hx of     Asthma No family hx of     Osteoporosis No family hx of     Genetic Disorder No family hx of     Obesity No family hx of     Unknown/Adopted No family hx of        Social History     Tobacco Use    Smoking status: Never    Smokeless tobacco: Never   Substance Use Topics    Alcohol use: Yes     Comment: Occ        Current Outpatient Medications   Medication Sig Dispense Refill    meloxicam (MOBIC) 7.5 MG tablet Take 1 tablet (7.5 mg) by mouth daily as needed for moderate pain or pain. 30 tablet 1     No current facility-administered medications for this visit.        Review of Systems  Constitutional, HEENT, cardiovascular,  pulmonary, GI, , musculoskeletal, neuro, skin, endocrine and psych systems are negative, except as otherwise noted.      Objective    Pulse 78   Temp 97.8  F (36.6  C) (Tympanic)   Resp 18   Wt 39.5 kg (87 lb)   LMP 01/02/2023   SpO2 99%   BMI 18.04 kg/m    Body mass index is 18.04 kg/m .  Physical Exam   GENERAL: alert and no distress  NECK: no adenopathy, no asymmetry, masses, or scars  RESP: lungs clear to auscultation - no rales, rhonchi or wheezes  CV: regular rate and rhythm, normal S1 S2, no S3 or S4, no murmur, click or rub, no peripheral edema  ABDOMEN: soft, nontender, no hepatosplenomegaly, no masses and bowel sounds normal  MS: no gross musculoskeletal defects noted, no edema  RIGHT ELBOW EXAM : Positive for mild discomfort on the ROM of right elbow and tenderness on the lateral epicondyle on palpation.   LEFT WRIST EXAM : Positive for mild discomfort on the ROM of left wrist and tenderness on radial styloid process on palpation.      Signed Electronically by: Alexandra Mayorga MD

## 2024-10-16 ENCOUNTER — THERAPY VISIT (OUTPATIENT)
Dept: OCCUPATIONAL THERAPY | Facility: CLINIC | Age: 49
End: 2024-10-16
Attending: INTERNAL MEDICINE
Payer: COMMERCIAL

## 2024-10-16 DIAGNOSIS — M25.521 RIGHT ELBOW PAIN: ICD-10-CM

## 2024-10-16 DIAGNOSIS — M77.11 RIGHT TENNIS ELBOW: ICD-10-CM

## 2024-10-16 DIAGNOSIS — M25.532 LEFT WRIST PAIN: ICD-10-CM

## 2024-10-16 DIAGNOSIS — M65.4 DE QUERVAIN'S DISEASE (TENOSYNOVITIS): ICD-10-CM

## 2024-10-16 PROCEDURE — 97110 THERAPEUTIC EXERCISES: CPT | Mod: GO | Performed by: OCCUPATIONAL THERAPIST

## 2024-10-16 PROCEDURE — 97140 MANUAL THERAPY 1/> REGIONS: CPT | Mod: GO | Performed by: OCCUPATIONAL THERAPIST

## 2024-10-16 PROCEDURE — 97165 OT EVAL LOW COMPLEX 30 MIN: CPT | Mod: GO | Performed by: OCCUPATIONAL THERAPIST

## 2024-10-16 NOTE — PROGRESS NOTES
OCCUPATIONAL THERAPY EVALUATION  Type of Visit: Evaluation     Fall Risk Screen:  Fall screen completed by: OT  Have you fallen 2 or more times in the past year?: No  Have you fallen and had an injury in the past year?: No  Is patient a fall risk?: No    Subjective      Presenting condition or subjective complaint: Left wrist pain and right elbow pain  Date of onset: 24    Relevant medical history:   No past medical history on file.  Dates & types of surgery:    Past Surgical History:   Procedure Laterality Date    ABDOMEN SURGERY  May 1996, 2001    Ceasection    BACK SURGERY      mayte placement for scoliosis    COLONOSCOPY N/A 2021    Procedure: COLONOSCOPY, WITH POLYPECTOMY AND BIOPSY;  Surgeon: Delma Keane MD;  Location:  GI    CYSTOSCOPY N/A 2023    Procedure: CYSTOSCOPY;  Surgeon: Charlotte Irvin MD;  Location: [facility]    CYSTOSCOPY, General  2023    DAVINCI HYSTERECTOMY TOTAL, BILATERAL SALPINGO-OOPHORECTOMY, COMBINED Bilateral 2023    Procedure: Robotic removal of uterus, cervix, both ovaries and fallopian tubes;  Surgeon: Charlotte Irvin MD;  [facility]    GYN SURGERY           PROCTOSCOPY  2023    PROCTOSCOPY N/A 2023    Procedure: Proctoscopy;  Surgeon: Charlotte Irvin MD;  [facility]    Robotic removal of uterus, cervix, both ovaries and fallopian tubes - Bilateral  2023     Prior diagnostic imaging/testing results: X-ray     Prior therapy history for the same diagnosis, illness or injury: No      Prior Level of Function  Transfers: Independent  Ambulation: Independent  ADL: Independent    Living Environment  Social support: With a significant other or spouse   Type of home: House   Stairs to enter the home: Yes 3 Is there a railing: No     Ramp: No   Stairs inside the home: Yes 13 Is there a railing: Yes     Help at home: None  Equipment owned:       Employment: Yes Mechanical    Hobbies/Interests:      Patient goals for therapy: Relief from tendenitis pain     Objective   ADDITIONAL HISTORY:  Right hand dominant  Patient reports symptoms of pain  Transportation: drives  Currently working in normal job without restrictions    Functional Outcome Measure:   Upper Extremity Functional Index Score:  SCORE:   Column Totals: /80: 73   (A lower score indicates greater disability.)    PAIN:  Pain Level at Rest: 0/10  Pain Level with Use: 4/10  Pain Location: R LEP, L radial wrist  Pain Quality: Burning and Sharp  Pain Frequency: intermittent  Pain is Worst: daytime or nighttime  Pain is Exacerbated By: movement after rest  Pain is Relieved By: movement  Pain Progression: Unchanged    ROM: WNL    STRENGTH:     Measured in pounds 10/16/2024 10/16/2024    Left Right   Trial 1 68 74   Average 68 74     Lateral Pinch  Measured in pounds 10/16/2024 10/16/2024    Left Right   Trial 1 12 12   Average 12 12     3 Point Pinch  Measured in pounds 10/16/2024 10/16/2024    Left Right   Trial 1 14 13   Average 14 13     Special Tests   10/16/2024 10/16/24   Side Left Right   Finkelsteins Mildly painful NT   ECRB Mild NT   Resisted Wrist ext with elbow at 90 NT -   Resisted Wrist ext with elbow ext NT -   Resisted long finger test NT -       Assessment & Plan   CLINICAL IMPRESSIONS  Medical Diagnosis: L wrist pain, R elbow pain    Treatment Diagnosis: L wrist pain, R elbow pain    Impression/Assessment: Pt is a 48 year old female presenting to Occupational Therapy due to right elbow pain and left wrist pain after taking Levoquin.  The following significant findings have been identified: Pain.  These identified deficits interfere with their ability to perform household chores as compared to previous level of function.   Patient's limitations or Problem List includes: Pain of the right elbow and left wrist which interferes with the patient's ability to perform Recreational Activities and Household  Chores as compared to previous level of function.    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  Clinical Decision Making (Complexity): Low complexity    PLAN OF CARE  Treatment Interventions:  Therapeutic Exercise:  PROM, Isotonics, and Isometrics  Neuromuscular re-education:  Nerve Gliding and Kinesiotaping  Manual Techniques:  Joint mobilization and Myofascial release    Long Term Goals   OT Goal 1  Goal Identifier: Household Chores  Goal Description: Pt will be able to open a new jar without pain in order to maximize independence with ADLs  Target Date: 11/27/24      Frequency of Treatment: 1x/week  Duration of Treatment: 6 weeks     Recommended Referrals to Other Professionals:  none  Education Assessment: Learner/Method: Patient;No Barriers to Learning     Risks and benefits of evaluation/treatment have been explained.   Patient/Family/caregiver agrees with Plan of Care.     Evaluation Time:        Present: Not applicable     Signing Clinician: Lorena Morin, OT

## 2025-03-06 ENCOUNTER — OFFICE VISIT (OUTPATIENT)
Dept: URGENT CARE | Facility: URGENT CARE | Age: 50
End: 2025-03-06
Payer: COMMERCIAL

## 2025-03-06 ENCOUNTER — ANCILLARY PROCEDURE (OUTPATIENT)
Dept: CT IMAGING | Facility: CLINIC | Age: 50
End: 2025-03-06
Attending: PHYSICIAN ASSISTANT
Payer: COMMERCIAL

## 2025-03-06 VITALS
OXYGEN SATURATION: 100 % | HEART RATE: 71 BPM | DIASTOLIC BLOOD PRESSURE: 74 MMHG | WEIGHT: 95 LBS | BODY MASS INDEX: 19.7 KG/M2 | SYSTOLIC BLOOD PRESSURE: 115 MMHG | RESPIRATION RATE: 20 BRPM | TEMPERATURE: 97.1 F

## 2025-03-06 DIAGNOSIS — R51.9 NONINTRACTABLE HEADACHE, UNSPECIFIED CHRONICITY PATTERN, UNSPECIFIED HEADACHE TYPE: ICD-10-CM

## 2025-03-06 DIAGNOSIS — S09.90XA CLOSED HEAD INJURY, INITIAL ENCOUNTER: Primary | ICD-10-CM

## 2025-03-06 DIAGNOSIS — R11.0 NAUSEA: ICD-10-CM

## 2025-03-06 DIAGNOSIS — S00.03XA SCALP HEMATOMA, INITIAL ENCOUNTER: ICD-10-CM

## 2025-03-06 DIAGNOSIS — S09.90XA CLOSED HEAD INJURY, INITIAL ENCOUNTER: ICD-10-CM

## 2025-03-06 PROCEDURE — 70450 CT HEAD/BRAIN W/O DYE: CPT

## 2025-03-06 RX ORDER — NAPROXEN 500 MG/1
500 TABLET ORAL 2 TIMES DAILY WITH MEALS
Qty: 30 TABLET | Refills: 3 | Status: SHIPPED | OUTPATIENT
Start: 2025-03-06 | End: 2026-03-06

## 2025-03-06 NOTE — PROGRESS NOTES
Assessment & Plan     Closed head injury, initial encounter    Patient had a fall this morning and hit her head  CT scan is neg for subdural hematoma  At this time patient can ice area and take naprosyn  - CT Head w/o Contrast      Scalp hematoma, initial encounter    A hematoma is a bad bruise. It happens when an injury causes blood to collect and pool under the skin. The pooling blood gives the skin a spongy, rubbery, lumpy feel.  A hematoma usually is not a cause for concern    - naproxen (NAPROSYN) 500 MG tablet  Dispense: 30 tablet; Refill: 3      Nonintractable headache, unspecified chronicity pattern, unspecified headache type    CT scan is neg for bleed  Patient has sclp and head pain in the area of the injury  Ice compresses  naprosyn  - CT Head w/o Contrast  - naproxen (NAPROSYN) 500 MG tablet  Dispense: 30 tablet; Refill: 3      Nausea    Patient is not asking for nausea medications  Monitor for any worsening symptoms including headache, dizziness, LOC,weakness and go to the ED if any of these symptoms occur  - CT Head w/o Contrast       At today's visit with Carlos Eng , we discussed results, diagnosis, medications and formulated a plan.  We also discussed red flags for immediate return to clinic/ER, as well as indications for follow up with PCP if not improved in 3 days. Patient understood and agreed to plan. Carlos Eng was discharged with stable vitals and has no further questions.       No follow-ups on file.    Jerson Kessler, [facility], PA-C  [facility]    Mckenna Gonzalez is a 49 year old female who presents to clinic today for the following health issues:  Chief Complaint   Patient presents with    Fall     This morning, bump on back of head        HPI  Review of Systems  Constitutional, HEENT, cardiovascular, pulmonary, GI, , musculoskeletal, neuro, skin, endocrine and psych systems are negative, except as otherwise noted.      Objective    /74    Pulse 71   Temp 97.1  F (36.2  C) (Tympanic)   Resp 20   Wt 43.1 kg (95 lb)   LMP 01/02/2023   SpO2 100%   BMI 19.70 kg/m    Physical Exam   GENERAL: alert and no distress  EYES: Eyes grossly normal to inspection, PERRL and conjunctivae and sclerae normal  HENT: ear canals and TM's normal, nose and mouth without ulcers or lesions  NECK: no adenopathy, no asymmetry, masses, or scars  RESP: lungs clear to auscultation - no rales, rhonchi or wheezes  CV: regular rate and rhythm, normal S1 S2, no S3 or S4, no murmur, click or rub, no peripheral edema  ABDOMEN: soft, nontender, no hepatosplenomegaly, no masses and bowel sounds normal  MS: no gross musculoskeletal defects noted, no edema  SKIN: pos for localized hematoma on scalp right occipital area  NEURO: Normal strength and tone, mentation intact and speech normal  PSYCH: mentation appears normal, affect normal/bright      Results for orders placed or performed in visit on 03/06/25   CT Head w/o Contrast     Status: None    Narrative    EXAM: CT HEAD W/O CONTRAST  LOCATION: [facility]  DATE: 3/6/2025    INDICATION:  Closed head injury, initial encounter, Nonintractable headache, unspecified chronicity pattern, unspecified headache type, Nausea  COMPARISON: None.  TECHNIQUE: Routine CT Head without IV contrast. Multiplanar reformats. Dose reduction techniques were used.    FINDINGS:  INTRACRANIAL CONTENTS: No intracranial hemorrhage, extraaxial collection, or mass effect.  No CT evidence of acute infarct. Normal parenchymal attenuation. Normal ventricles and sulci.     VISUALIZED ORBITS/SINUSES/MASTOIDS: No intraorbital abnormality. No paranasal sinus mucosal disease. No middle ear or mastoid effusion.    BONES/SOFT TISSUES: Right posterior parietal scalp hematoma. No underlying fracture.      Impression    IMPRESSION:  1.  No acute intracranial abnormality.

## 2025-03-10 PROBLEM — M77.11 RIGHT TENNIS ELBOW: Status: RESOLVED | Noted: 2024-10-16 | Resolved: 2025-03-10

## 2025-03-10 PROBLEM — M25.532 LEFT WRIST PAIN: Status: RESOLVED | Noted: 2024-10-16 | Resolved: 2025-03-10

## 2025-03-10 PROBLEM — M65.4 DE QUERVAIN'S DISEASE (TENOSYNOVITIS): Status: RESOLVED | Noted: 2024-10-16 | Resolved: 2025-03-10

## 2025-03-10 PROBLEM — M25.521 RIGHT ELBOW PAIN: Status: RESOLVED | Noted: 2024-10-16 | Resolved: 2025-03-10

## 2025-05-24 ENCOUNTER — HEALTH MAINTENANCE LETTER (OUTPATIENT)
Age: 50
End: 2025-05-24

## (undated) DEVICE — GLOVE BIOGEL PI ULTRATOUCH SZ 6.5 41165

## (undated) DEVICE — DEVICE SUTURE PASSER 14GA WECK EFX EFXSP2

## (undated) DEVICE — PACK GOWN 3/PK DISP XL SBA32GPFCB

## (undated) DEVICE — DAVINCI XI DRIVER NDL MEGA SUTURECUT 8MM EXT 471309

## (undated) DEVICE — ENDO TROCAR FIRST ENTRY KII FIOS Z-THRD 05X100MM CTF03

## (undated) DEVICE — PREP CHLORAPREP 26ML TINTED HI-LITE ORANGE 930815

## (undated) DEVICE — DAVINCI XI GRASPER PROGRASP 8MM EXT 471093

## (undated) DEVICE — KOH COLPOTOMIZER OCCLUDER  CPO-6

## (undated) DEVICE — ESU GROUND PAD ADULT REM W/15' CORD E7507DB

## (undated) DEVICE — DAVINCI XI MONOPOLAR SCISSORS HOT SHEARS 8MM 470179

## (undated) DEVICE — TUBING IRRIG CYSTO/BLADDER SET 81" LF 2C4040

## (undated) DEVICE — WIPES FOLEY CARE SURESTEP PROVON DFC100

## (undated) DEVICE — KIT PATIENT POSITIONING PIGAZZI LATEX FREE 40580

## (undated) DEVICE — RETR ELEV / UTERINE MANIPULATOR V-CARE MED CUP 60-6085-201A

## (undated) DEVICE — DAVINCI HOT SHEARS TIP COVER  400180

## (undated) DEVICE — LINEN TOWEL PACK X30 5481

## (undated) DEVICE — PROTECTOR ARM ONE-STEP TRENDELENBURG 40418

## (undated) DEVICE — LINEN TOWEL PACK X6 WHITE 5487

## (undated) DEVICE — SYSTEM CLEARIFY VISUALIZATION 21-345

## (undated) DEVICE — TUBING SMOKE EVAC PNEUMOCLEAR HIGH FLOW 0620050250

## (undated) DEVICE — PREP SCRUB SOL EXIDINE 4% CHG 4OZ 29002-404

## (undated) DEVICE — SPECIMEN TRAP MUCOUS 40ML LUKI C30200A

## (undated) DEVICE — ADH SKIN CLOSURE PREMIERPRO EXOFIN 1.0ML 3470

## (undated) DEVICE — Device

## (undated) DEVICE — SUCTION IRR STRYKERFLOW II W/TIP 250-070-520

## (undated) DEVICE — SUCTION MANIFOLD NEPTUNE 2 SYS 4 PORT 0702-020-000

## (undated) DEVICE — SU VICRYL 4-0 PS-2 18" UND J496H

## (undated) DEVICE — DRAPE SHEET REV FOLD 3/4 9349

## (undated) DEVICE — JELLY LUBRICATING SURGILUBE 2OZ TUBE

## (undated) DEVICE — DAVINCI XI SEAL UNIVERSAL 5-8MM 470361

## (undated) DEVICE — SOL NACL 0.9% IRRIG 1000ML BOTTLE 2F7124

## (undated) DEVICE — DRAPE MAYO STAND 23X54 8337

## (undated) DEVICE — DAVINCI XI DRAPE ARM 470015

## (undated) DEVICE — NDL INSUFFLATION 13GA 120MM C2201

## (undated) DEVICE — SU WND CLOSURE VLOC 180 ABS 0 9" GS-21 VLOCL0346

## (undated) DEVICE — GLOVE BIOGEL PI MICRO INDICATOR UNDERGLOVE SZ 7.0 48970

## (undated) DEVICE — DAVINCI XI DRAPE COLUMN 470341

## (undated) DEVICE — SOL NACL 0.9% INJ 1000ML BAG 2B1324X

## (undated) RX ORDER — FENTANYL CITRATE-0.9 % NACL/PF 10 MCG/ML
PLASTIC BAG, INJECTION (ML) INTRAVENOUS
Status: DISPENSED
Start: 2023-01-19

## (undated) RX ORDER — CALCIUM CHLORIDE 100 MG/ML
INJECTION INTRAVENOUS; INTRAVENTRICULAR
Status: DISPENSED
Start: 2023-01-19

## (undated) RX ORDER — FENTANYL CITRATE 50 UG/ML
INJECTION, SOLUTION INTRAMUSCULAR; INTRAVENOUS
Status: DISPENSED
Start: 2023-01-19

## (undated) RX ORDER — ONDANSETRON 2 MG/ML
INJECTION INTRAMUSCULAR; INTRAVENOUS
Status: DISPENSED
Start: 2023-01-19

## (undated) RX ORDER — CEFAZOLIN SODIUM/WATER 2 G/20 ML
SYRINGE (ML) INTRAVENOUS
Status: DISPENSED
Start: 2023-01-19

## (undated) RX ORDER — KETOROLAC TROMETHAMINE 30 MG/ML
INJECTION, SOLUTION INTRAMUSCULAR; INTRAVENOUS
Status: DISPENSED
Start: 2023-01-19

## (undated) RX ORDER — PROPOFOL 10 MG/ML
INJECTION, EMULSION INTRAVENOUS
Status: DISPENSED
Start: 2023-01-19

## (undated) RX ORDER — HEPARIN SODIUM 5000 [USP'U]/.5ML
INJECTION, SOLUTION INTRAVENOUS; SUBCUTANEOUS
Status: DISPENSED
Start: 2023-01-19

## (undated) RX ORDER — HYDROMORPHONE HYDROCHLORIDE 1 MG/ML
INJECTION, SOLUTION INTRAMUSCULAR; INTRAVENOUS; SUBCUTANEOUS
Status: DISPENSED
Start: 2023-01-19

## (undated) RX ORDER — DEXAMETHASONE SODIUM PHOSPHATE 4 MG/ML
INJECTION, SOLUTION INTRA-ARTICULAR; INTRALESIONAL; INTRAMUSCULAR; INTRAVENOUS; SOFT TISSUE
Status: DISPENSED
Start: 2023-01-19

## (undated) RX ORDER — FENTANYL CITRATE 50 UG/ML
INJECTION, SOLUTION INTRAMUSCULAR; INTRAVENOUS
Status: DISPENSED
Start: 2021-02-12

## (undated) RX ORDER — PHENAZOPYRIDINE HYDROCHLORIDE 200 MG/1
TABLET, FILM COATED ORAL
Status: DISPENSED
Start: 2023-01-19

## (undated) RX ORDER — METRONIDAZOLE 500 MG/100ML
INJECTION, SOLUTION INTRAVENOUS
Status: DISPENSED
Start: 2023-01-19